# Patient Record
Sex: FEMALE | Race: WHITE | NOT HISPANIC OR LATINO | Employment: OTHER | URBAN - NONMETROPOLITAN AREA
[De-identification: names, ages, dates, MRNs, and addresses within clinical notes are randomized per-mention and may not be internally consistent; named-entity substitution may affect disease eponyms.]

---

## 2017-01-30 ENCOUNTER — APPOINTMENT (OUTPATIENT)
Dept: LAB | Facility: HOSPITAL | Age: 67
End: 2017-01-30

## 2017-01-30 ENCOUNTER — TRANSCRIBE ORDERS (OUTPATIENT)
Dept: ADMINISTRATIVE | Facility: HOSPITAL | Age: 67
End: 2017-01-30

## 2017-01-30 ENCOUNTER — HOSPITAL ENCOUNTER (OUTPATIENT)
Dept: GENERAL RADIOLOGY | Facility: HOSPITAL | Age: 67
Discharge: HOME | End: 2017-01-30
Admitting: NURSE PRACTITIONER

## 2017-01-30 DIAGNOSIS — J06.9 ACUTE UPPER RESPIRATORY INFECTIONS OF OTHER MULTIPLE SITES: ICD-10-CM

## 2017-01-30 DIAGNOSIS — J20.9 ACUTE BRONCHITIS, UNSPECIFIED ORGANISM: Primary | ICD-10-CM

## 2017-01-30 DIAGNOSIS — R05.9 COUGH: ICD-10-CM

## 2017-01-30 DIAGNOSIS — J30.1 ALLERGIC RHINITIS DUE TO POLLEN, UNSPECIFIED RHINITIS SEASONALITY: ICD-10-CM

## 2017-01-30 DIAGNOSIS — J30.89 ALLERGIC RHINITIS DUE TO DUST: ICD-10-CM

## 2017-01-30 LAB
BASOPHILS # BLD AUTO: 0.04 10*3/MM3 (ref 0–0.2)
BASOPHILS NFR BLD AUTO: 0.5 % (ref 0–2)
DEPRECATED RDW RBC AUTO: 44.2 FL (ref 40–54)
EOSINOPHIL # BLD AUTO: 0.2 10*3/MM3 (ref 0–0.7)
EOSINOPHIL NFR BLD AUTO: 2.6 % (ref 0–4)
ERYTHROCYTE [DISTWIDTH] IN BLOOD BY AUTOMATED COUNT: 13.1 % (ref 12–15)
HCT VFR BLD AUTO: 41.7 % (ref 37–47)
HGB BLD-MCNC: 14.2 G/DL (ref 12–16)
IMM GRANULOCYTES # BLD: 0.01 10*3/MM3 (ref 0–0.03)
IMM GRANULOCYTES NFR BLD: 0.1 % (ref 0–5)
LYMPHOCYTES # BLD AUTO: 1.51 10*3/MM3 (ref 0.72–4.86)
LYMPHOCYTES NFR BLD AUTO: 19.9 % (ref 15–45)
MCH RBC QN AUTO: 31.8 PG (ref 28–32)
MCHC RBC AUTO-ENTMCNC: 34.1 G/DL (ref 33–36)
MCV RBC AUTO: 93.3 FL (ref 82–98)
MONOCYTES # BLD AUTO: 0.32 10*3/MM3 (ref 0.19–1.3)
MONOCYTES NFR BLD AUTO: 4.2 % (ref 4–12)
NEUTROPHILS # BLD AUTO: 5.52 10*3/MM3 (ref 1.87–8.4)
NEUTROPHILS NFR BLD AUTO: 72.7 % (ref 39–78)
PLATELET # BLD AUTO: 279 10*3/MM3 (ref 130–400)
PMV BLD AUTO: 9.8 FL (ref 6–12)
RBC # BLD AUTO: 4.47 10*6/MM3 (ref 4.2–5.4)
WBC NRBC COR # BLD: 7.6 10*3/MM3 (ref 4.8–10.8)

## 2017-01-30 PROCEDURE — 86631 CHLAMYDIA ANTIBODY: CPT | Performed by: NURSE PRACTITIONER

## 2017-01-30 PROCEDURE — 85025 COMPLETE CBC W/AUTO DIFF WBC: CPT | Performed by: NURSE PRACTITIONER

## 2017-01-30 PROCEDURE — 36415 COLL VENOUS BLD VENIPUNCTURE: CPT | Performed by: NURSE PRACTITIONER

## 2017-01-30 PROCEDURE — 71020 HC CHEST PA AND LATERAL: CPT

## 2017-01-30 PROCEDURE — 86631 CHLAMYDIA ANTIBODY: CPT

## 2017-01-30 PROCEDURE — 86156 COLD AGGLUTININ SCREEN: CPT | Performed by: NURSE PRACTITIONER

## 2017-01-30 PROCEDURE — 86632 CHLAMYDIA IGM ANTIBODY: CPT

## 2017-01-31 LAB
COLD AGGLUTININS: NORMAL
M PNEUMONIAE IGG ABS: 193 U/ML (ref 0–99)
M PNEUMONIAE IGM ABS: <770 U/ML (ref 0–769)

## 2017-02-02 LAB
C PNEUM IGG TITR SER IF: ABNORMAL {TITER}
C PNEUM IGM TITR SER IF: ABNORMAL {TITER}
REF LAB TEST REF RANGE: ABNORMAL

## 2017-04-06 ENCOUNTER — OFFICE VISIT (OUTPATIENT)
Dept: GASTROENTEROLOGY | Facility: CLINIC | Age: 67
End: 2017-04-06

## 2017-04-06 VITALS
SYSTOLIC BLOOD PRESSURE: 132 MMHG | WEIGHT: 170 LBS | DIASTOLIC BLOOD PRESSURE: 82 MMHG | OXYGEN SATURATION: 90 % | TEMPERATURE: 97.1 F | BODY MASS INDEX: 25.18 KG/M2 | HEART RATE: 62 BPM | HEIGHT: 69 IN

## 2017-04-06 DIAGNOSIS — R13.19 OTHER DYSPHAGIA: Primary | ICD-10-CM

## 2017-04-06 DIAGNOSIS — K21.9 GASTROESOPHAGEAL REFLUX DISEASE WITHOUT ESOPHAGITIS: ICD-10-CM

## 2017-04-06 PROBLEM — R13.10 DYSPHAGIA: Status: ACTIVE | Noted: 2017-04-06

## 2017-04-06 PROCEDURE — 99214 OFFICE O/P EST MOD 30 MIN: CPT | Performed by: NURSE PRACTITIONER

## 2017-04-06 RX ORDER — MAGNESIUM GLUCONATE 27 MG(500)
500 TABLET ORAL 2 TIMES DAILY
COMMUNITY
End: 2017-08-09

## 2017-04-06 RX ORDER — SPIRONOLACTONE 25 MG/1
25 TABLET ORAL DAILY
COMMUNITY
End: 2017-08-09

## 2017-04-06 RX ORDER — DIPHENHYDRAMINE HYDROCHLORIDE 25 MG/1
TABLET ORAL
COMMUNITY
End: 2020-09-02

## 2017-04-06 NOTE — PROGRESS NOTES
Chief Complaint:   Chief Complaint   Patient presents with   • Difficulty Swallowing     Patient is here today having difficulty swallowing. She states that she gets choked alot too.         Patient ID: Earline Salazar is a 67 y.o. female     History of Present Illness:    This is a very pleasant 67 year female who comes in today with complaints of difficulty swallowing.  The patient states that this has been occurring for approximately 1 month and seems to be worsening.  The patient states that she feels as though food such as solids are getting stuck in her upper mid epigastric area.  She states that she finds that there are no relieving factors.  The patient states that she does have a history of GERD and takes Prevacid for this.  She states that is very well controlled unless she forgets to take it.    Shin any nausea, vomiting, hematemesis, pyrosis or odynophagia.  She denies any unintentional weight loss or loss of appetite.  She denies any fever or chills.  She denies any bright red blood per rectum or black tarry stools.    The patient's last endoscopy performed on 3/10/16 with findings of a small hiatus hernia and low grade of narrowing Schatzki ring that was dilated.    Past Medical History:   Diagnosis Date   • Allergic rhinitis    • Arthralgia    • Hyperlipidemia    • Schatzki's ring        Past Surgical History:   Procedure Laterality Date   • APPENDECTOMY     • COLONOSCOPY  03/10/2016   • HYSTERECTOMY     • PATENT FORAMEN OVALE CLOSURE     • REPLACEMENT TOTAL KNEE     • TONSILLECTOMY     • UPPER GASTROINTESTINAL ENDOSCOPY  03/10/2016         Current Outpatient Prescriptions:   •  Biotin (BIOTIN 5000) 5 MG capsule, Take  by mouth., Disp: , Rfl:   •  buPROPion SR (WELLBUTRIN SR) 150 MG 12 hr tablet, Take 150 mg by mouth 2 (Two) Times a Day., Disp: , Rfl:   •  calcium citrate-vitamin d (CITRACAL) 200-250 MG-UNIT tablet tablet, Take  by mouth Daily., Disp: , Rfl:   •  cetirizine (zyrTEC) 10 MG tablet,  "Take 10 mg by mouth Daily., Disp: , Rfl:   •  Flaxseed, Linseed, (FLAX SEED OIL PO), Take  by mouth., Disp: , Rfl:   •  lansoprazole (PREVACID) 15 MG capsule, Take 15 mg by mouth Daily., Disp: , Rfl:   •  levocetirizine (XYZAL) 5 MG tablet, Take 5 mg by mouth Every Evening., Disp: , Rfl:   •  magnesium gluconate (MAGONATE) 500 MG tablet, Take 500 mg by mouth 2 (Two) Times a Day., Disp: , Rfl:   •  montelukast (SINGULAIR) 10 MG tablet, Take 10 mg by mouth Every Night., Disp: , Rfl:   •  nabumetone (RELAFEN) 750 MG tablet, Take 750 mg by mouth 2 (Two) Times a Day., Disp: , Rfl:   •  pramipexole (MIRAPEX) 1 MG tablet, Take 1 mg by mouth 3 (Three) Times a Day., Disp: , Rfl:   •  spironolactone (ALDACTONE) 25 MG tablet, Take 25 mg by mouth Daily., Disp: , Rfl:   •  triamterene-hydrochlorothiazide (MAXZIDE-25) 37.5-25 MG per tablet, Take 1 tablet by mouth Daily., Disp: , Rfl:     No Known Allergies    Social History     Social History   • Marital status:      Spouse name: N/A   • Number of children: N/A   • Years of education: N/A     Occupational History   • Not on file.     Social History Main Topics   • Smoking status: Former Smoker   • Smokeless tobacco: Not on file   • Alcohol use Yes   • Drug use: Not on file   • Sexual activity: Not on file     Other Topics Concern   • Not on file     Social History Narrative       Family History   Problem Relation Age of Onset   • Colon polyps Mother        Vitals:    04/06/17 0848   BP: 132/82   Pulse: 62   Temp: 97.1 °F (36.2 °C)   SpO2: 90%   Weight: 170 lb (77.1 kg)   Height: 69\" (175.3 cm)       Review of Systems:    General:    Present -feeling well   Skin:    Not Present-Rash   HEENT:     Not Present-Acute visual changes or Acute hearing changes   Neck :    Not Present- swollen glands   Genitourinary:      Not Present- burning, frequency, urgency hematuria, dysuria,   Cardiovascular:   Not Present-chest pain, palpitations, or pressure   Respiratory:   Not Present- " shortness of breath or cough   Gastrointestinal:  Musculoskeletal:  Neurological:  Psychiatric:   Present as mentioned in the HP    Not Present. Recent gait disturbances.    Not Present-Seizures and weakness in extremities.    Not Present- Anxiety or Depression.       Physical Exam:    General Appearance:    Alert, cooperative, in no acute distress   Psych:    Mood appropriate    Eyes:          conjunctivae and sclerae normal, no   icterus, no pallor   ENMT:    Ears appear intact with no abnormalities noted oral mucosa moist   Neck:   No adenopathy, supple, trachea midline, no thyromegaly, no   carotid bruit, no JVD    Cardiovascular:    Regular rhythm and normal rate, normal S1 and S2, no            murmur, no gallop, no rub, no click   Gastrointestinal:     Inspection normal.  Normal bowel sounds, no masses, no organomegaly, soft round non-tender, non-distended, no guarding, no rebound or tenderness. No hepatosplenomegaly.   Skin:   No bleeding, bruising or rash   Neurologic:   nonfocal       Lab Results   Component Value Date    WBC 7.60 01/30/2017    WBC 5.40 09/24/2014    HGB 14.2 01/30/2017    HGB 14.6 09/24/2014    HCT 41.7 01/30/2017    HCT 43.9 09/24/2014    HCT 41.0 09/24/2014     01/30/2017     09/24/2014     09/24/2014        No results found for: NA, K, CL, CO2, BUN, CREATININE, BILITOT, ALKPHOS, ALT, AST, GLUCOSE    No results found for: INR    Assessment and Plan:    Earline was seen today for difficulty swallowing.    Diagnoses and all orders for this visit:    Other dysphagia  -     Case request endoscopy    Gastroesophageal reflux disease without esophagitis  -     Case request    Endoscopy to be scheduled.  Patient instructed to continue Prevacid.    There are no Patient Instructions on file for this visit.    Next follow-up appointment    The risks, benefits, and alternatives of endoscopy were reviewed with the patient today.  Risks including perforation, with or without  dilation, possibly requiring surgery.  Additional risks include risk of bleeding.  There is also the risk of a drug reaction or problems with anesthesia.  This will be discussed with the further by the anesthesia team on the day of the procedure. The benefits include the diagnosis and management of disease of the upper digestive tract.  Alternatives to endoscopy include upper GI series, laboratory testing, radiographic evaluation, or no intervention.  The patient verbalizes understanding and agrees.      EMR Dragon/Transcription disclaimer:  Much of this encounter note is an electronic transcription/translation of spoken language to printed text. The electronic translation of spoken language may permit erroneous, or at times, nonsensical words or phrases to be inadvertently transcribed; although I have reviewed the note for such errors, some may still exist.

## 2017-05-11 ENCOUNTER — ANESTHESIA EVENT (OUTPATIENT)
Dept: GASTROENTEROLOGY | Facility: HOSPITAL | Age: 67
End: 2017-05-11

## 2017-05-12 ENCOUNTER — HOSPITAL ENCOUNTER (OUTPATIENT)
Facility: HOSPITAL | Age: 67
Setting detail: HOSPITAL OUTPATIENT SURGERY
Discharge: HOME | End: 2017-05-12
Attending: INTERNAL MEDICINE | Admitting: INTERNAL MEDICINE

## 2017-05-12 ENCOUNTER — ANESTHESIA (OUTPATIENT)
Dept: GASTROENTEROLOGY | Facility: HOSPITAL | Age: 67
End: 2017-05-12

## 2017-05-12 VITALS
SYSTOLIC BLOOD PRESSURE: 122 MMHG | RESPIRATION RATE: 16 BRPM | OXYGEN SATURATION: 96 % | BODY MASS INDEX: 24.73 KG/M2 | WEIGHT: 167 LBS | HEIGHT: 69 IN | HEART RATE: 70 BPM | TEMPERATURE: 97.3 F | DIASTOLIC BLOOD PRESSURE: 68 MMHG

## 2017-05-12 PROCEDURE — C1726 CATH, BAL DIL, NON-VASCULAR: HCPCS | Performed by: INTERNAL MEDICINE

## 2017-05-12 PROCEDURE — C1726 CATH, BAL DIL, NON-VASCULAR: HCPCS

## 2017-05-12 PROCEDURE — 25010000002 PROPOFOL 10 MG/ML EMULSION: Performed by: NURSE ANESTHETIST, CERTIFIED REGISTERED

## 2017-05-12 PROCEDURE — 43249 ESOPH EGD DILATION <30 MM: CPT | Performed by: INTERNAL MEDICINE

## 2017-05-12 RX ORDER — LANSOPRAZOLE 15 MG/1
30 CAPSULE, DELAYED RELEASE ORAL DAILY
Qty: 30 CAPSULE | Refills: 11 | Status: SHIPPED | OUTPATIENT
Start: 2017-05-12 | End: 2017-08-09 | Stop reason: DRUGHIGH

## 2017-05-12 RX ORDER — PROPOFOL 10 MG/ML
VIAL (ML) INTRAVENOUS AS NEEDED
Status: DISCONTINUED | OUTPATIENT
Start: 2017-05-12 | End: 2017-05-12 | Stop reason: SURG

## 2017-05-12 RX ORDER — SODIUM CHLORIDE 0.9 % (FLUSH) 0.9 %
1-10 SYRINGE (ML) INJECTION AS NEEDED
Status: DISCONTINUED | OUTPATIENT
Start: 2017-05-12 | End: 2017-05-12 | Stop reason: HOSPADM

## 2017-05-12 RX ORDER — SODIUM CHLORIDE 9 MG/ML
100 INJECTION, SOLUTION INTRAVENOUS CONTINUOUS
Status: DISCONTINUED | OUTPATIENT
Start: 2017-05-12 | End: 2017-05-12 | Stop reason: HOSPADM

## 2017-05-12 RX ORDER — LIDOCAINE HYDROCHLORIDE 20 MG/ML
INJECTION, SOLUTION INFILTRATION; PERINEURAL AS NEEDED
Status: DISCONTINUED | OUTPATIENT
Start: 2017-05-12 | End: 2017-05-12 | Stop reason: SURG

## 2017-05-12 RX ADMIN — SODIUM CHLORIDE 100 ML/HR: 9 INJECTION, SOLUTION INTRAVENOUS at 10:36

## 2017-05-12 RX ADMIN — PROPOFOL 200 MG: 10 INJECTION, EMULSION INTRAVENOUS at 12:40

## 2017-05-12 RX ADMIN — PROPOFOL 200 MG: 10 INJECTION, EMULSION INTRAVENOUS at 12:46

## 2017-05-12 RX ADMIN — LIDOCAINE HYDROCHLORIDE 100 MG: 20 INJECTION, SOLUTION INFILTRATION; PERINEURAL at 12:40

## 2017-05-31 ENCOUNTER — TRANSCRIBE ORDERS (OUTPATIENT)
Dept: LAB | Facility: HOSPITAL | Age: 67
End: 2017-05-31

## 2017-05-31 ENCOUNTER — HOSPITAL ENCOUNTER (OUTPATIENT)
Dept: ULTRASOUND IMAGING | Facility: HOSPITAL | Age: 67
Discharge: HOME | End: 2017-05-31
Admitting: PHYSICIAN ASSISTANT

## 2017-05-31 DIAGNOSIS — R94.6 ABNORMAL FINDING ON THYROID FUNCTION TEST: ICD-10-CM

## 2017-05-31 DIAGNOSIS — G47.00 INSOMNIA, UNSPECIFIED: ICD-10-CM

## 2017-05-31 DIAGNOSIS — L65.8 OTHER SPECIFIED NONSCARRING HAIR LOSS: ICD-10-CM

## 2017-05-31 DIAGNOSIS — R94.6 ABNORMAL FINDING ON THYROID FUNCTION TEST: Primary | ICD-10-CM

## 2017-05-31 PROCEDURE — 76536 US EXAM OF HEAD AND NECK: CPT

## 2017-08-09 ENCOUNTER — OFFICE VISIT (OUTPATIENT)
Dept: OBSTETRICS AND GYNECOLOGY | Facility: CLINIC | Age: 67
End: 2017-08-09

## 2017-08-09 VITALS
HEIGHT: 69 IN | DIASTOLIC BLOOD PRESSURE: 80 MMHG | BODY MASS INDEX: 23.25 KG/M2 | SYSTOLIC BLOOD PRESSURE: 120 MMHG | WEIGHT: 157 LBS

## 2017-08-09 DIAGNOSIS — N95.1 VAGINAL DRYNESS, MENOPAUSAL: ICD-10-CM

## 2017-08-09 DIAGNOSIS — N93.9 VAGINA BLEEDING: Primary | ICD-10-CM

## 2017-08-09 DIAGNOSIS — Z78.9 NON-SMOKER: ICD-10-CM

## 2017-08-09 PROCEDURE — 99203 OFFICE O/P NEW LOW 30 MIN: CPT | Performed by: OBSTETRICS & GYNECOLOGY

## 2017-08-09 RX ORDER — MELATONIN
1000 DAILY
COMMUNITY
End: 2021-11-05

## 2017-08-09 RX ORDER — ASPIRIN 81 MG/1
81 TABLET, CHEWABLE ORAL DAILY
COMMUNITY
End: 2021-11-05

## 2017-08-09 RX ORDER — LANSOPRAZOLE 30 MG/1
30 CAPSULE, DELAYED RELEASE ORAL DAILY
COMMUNITY
Start: 2017-07-13 | End: 2020-09-02

## 2017-08-09 RX ORDER — BUPROPION HYDROCHLORIDE 300 MG/1
TABLET ORAL
COMMUNITY
Start: 2017-05-11 | End: 2018-09-12

## 2017-08-09 RX ORDER — NABUMETONE 500 MG/1
TABLET, FILM COATED ORAL
COMMUNITY
Start: 2017-07-13 | End: 2018-09-12

## 2017-10-11 ENCOUNTER — HOSPITAL ENCOUNTER (OUTPATIENT)
Dept: WOMENS IMAGING | Age: 67
Discharge: HOME | End: 2017-10-11
Payer: MEDICARE

## 2017-10-11 DIAGNOSIS — Z12.31 ENCOUNTER FOR SCREENING MAMMOGRAM FOR MALIGNANT NEOPLASM OF BREAST: ICD-10-CM

## 2017-10-11 PROCEDURE — 77063 BREAST TOMOSYNTHESIS BI: CPT

## 2017-10-18 ENCOUNTER — OFFICE VISIT (OUTPATIENT)
Dept: OBSTETRICS AND GYNECOLOGY | Facility: CLINIC | Age: 67
End: 2017-10-18

## 2017-10-18 VITALS
BODY MASS INDEX: 24.14 KG/M2 | HEIGHT: 69 IN | SYSTOLIC BLOOD PRESSURE: 120 MMHG | WEIGHT: 163 LBS | DIASTOLIC BLOOD PRESSURE: 74 MMHG

## 2017-10-18 DIAGNOSIS — N89.8 VAGINAL DRYNESS: ICD-10-CM

## 2017-10-18 DIAGNOSIS — Z78.9 NON-SMOKER: ICD-10-CM

## 2017-10-18 DIAGNOSIS — N93.9 VAGINA BLEEDING: Primary | ICD-10-CM

## 2017-10-18 PROCEDURE — 99213 OFFICE O/P EST LOW 20 MIN: CPT | Performed by: OBSTETRICS & GYNECOLOGY

## 2017-10-18 RX ORDER — PREDNISONE 20 MG/1
TABLET ORAL
COMMUNITY
Start: 2017-09-07 | End: 2018-05-08

## 2017-10-18 NOTE — PROGRESS NOTES
Subjective   Earline Salazar is a 67 y.o. female is here today for follow-up.  Has had good results when she remembers to use the cream    Vaginal Bleeding   The patient's primary symptoms include vaginal bleeding. The patient's pertinent negatives include no genital itching, genital lesions, genital odor, genital rash, missed menses, pelvic pain or vaginal discharge. This is a chronic problem. The current episode started more than 1 month ago. The problem occurs intermittently. The problem has been resolved. The patient is experiencing no pain. Pertinent negatives include no abdominal pain, anorexia, back pain, chills, constipation, diarrhea, discolored urine, dysuria, fever, flank pain, frequency, headaches, hematuria, joint pain, joint swelling, nausea, painful intercourse, rash, sore throat, urgency or vomiting. The vaginal discharge was normal. The vaginal bleeding is spotting. She has not been passing clots. She has not been passing tissue. Nothing aggravates the symptoms. Treatments tried: vaginal premarin cream. The treatment provided significant relief.       The following portions of the patient's history were reviewed and updated as appropriate: allergies, current medications, past family history, past medical history, past social history, past surgical history and problem list.    Review of Systems   Constitutional: Negative for chills and fever.   HENT: Negative for sore throat.    Gastrointestinal: Negative for abdominal pain, anorexia, constipation, diarrhea, nausea and vomiting.   Genitourinary: Positive for vaginal bleeding. Negative for dysuria, flank pain, frequency, hematuria, missed menses, pelvic pain, urgency and vaginal discharge.   Musculoskeletal: Negative for back pain and joint pain.   Skin: Negative for rash.   Neurological: Negative for headaches.   All other systems reviewed and are negative.      Objective   Physical Exam   Constitutional: She is oriented to person, place, and time.  She appears well-developed and well-nourished.   HENT:   Head: Normocephalic and atraumatic.   Neurological: She is alert and oriented to person, place, and time.   Skin: Skin is warm and dry.   Psychiatric: She has a normal mood and affect. Her behavior is normal. Judgment and thought content normal.   Nursing note and vitals reviewed.        Assessment/Plan   Earline was seen today for vaginal bleeding.    Diagnoses and all orders for this visit:    Vagina bleeding  -     conjugated estrogens (PREMARIN) 0.625 MG/GM vaginal cream; Insert  into the vagina 3 (Three) Times a Week.    Vaginal dryness  -     conjugated estrogens (PREMARIN) 0.625 MG/GM vaginal cream; Insert  into the vagina 3 (Three) Times a Week.    Non-smoker      Aldo Cabral MD

## 2018-03-27 ENCOUNTER — APPOINTMENT (OUTPATIENT)
Dept: LAB | Facility: HOSPITAL | Age: 68
End: 2018-03-27

## 2018-03-27 ENCOUNTER — TRANSCRIBE ORDERS (OUTPATIENT)
Dept: ADMINISTRATIVE | Facility: HOSPITAL | Age: 68
End: 2018-03-27

## 2018-03-27 DIAGNOSIS — J20.9 ACUTE BRONCHITIS, UNSPECIFIED ORGANISM: ICD-10-CM

## 2018-03-27 DIAGNOSIS — J01.90 ACUTE SINUSITIS, RECURRENCE NOT SPECIFIED, UNSPECIFIED LOCATION: Primary | ICD-10-CM

## 2018-03-27 LAB
ALBUMIN SERPL-MCNC: 3.8 G/DL (ref 3.5–5)
ALBUMIN/GLOB SERPL: 1.3 G/DL (ref 1.1–2.5)
ALP SERPL-CCNC: 79 U/L (ref 24–120)
ALT SERPL W P-5'-P-CCNC: 43 U/L (ref 0–54)
ANION GAP SERPL CALCULATED.3IONS-SCNC: 9 MMOL/L (ref 4–13)
AST SERPL-CCNC: 31 U/L (ref 7–45)
BASOPHILS # BLD AUTO: 0.04 10*3/MM3 (ref 0–0.2)
BASOPHILS NFR BLD AUTO: 0.5 % (ref 0–2)
BILIRUB SERPL-MCNC: 0.8 MG/DL (ref 0.1–1)
BUN BLD-MCNC: 24 MG/DL (ref 5–21)
BUN/CREAT SERPL: 30 (ref 7–25)
CALCIUM SPEC-SCNC: 9.4 MG/DL (ref 8.4–10.4)
CHLORIDE SERPL-SCNC: 103 MMOL/L (ref 98–110)
CO2 SERPL-SCNC: 29 MMOL/L (ref 24–31)
CREAT BLD-MCNC: 0.8 MG/DL (ref 0.5–1.4)
DEPRECATED RDW RBC AUTO: 41.1 FL (ref 40–54)
EOSINOPHIL # BLD AUTO: 0.24 10*3/MM3 (ref 0–0.7)
EOSINOPHIL NFR BLD AUTO: 2.9 % (ref 0–4)
ERYTHROCYTE [DISTWIDTH] IN BLOOD BY AUTOMATED COUNT: 12.5 % (ref 12–15)
ERYTHROCYTE [SEDIMENTATION RATE] IN BLOOD: 3 MM/HR (ref 0–20)
GFR SERPL CREATININE-BSD FRML MDRD: 72 ML/MIN/1.73
GLOBULIN UR ELPH-MCNC: 2.9 GM/DL
GLUCOSE BLD-MCNC: 103 MG/DL (ref 70–100)
HCT VFR BLD AUTO: 41.5 % (ref 37–47)
HGB BLD-MCNC: 14.2 G/DL (ref 12–16)
IMM GRANULOCYTES # BLD: 0.03 10*3/MM3 (ref 0–0.03)
IMM GRANULOCYTES NFR BLD: 0.4 % (ref 0–5)
LYMPHOCYTES # BLD AUTO: 1.28 10*3/MM3 (ref 0.72–4.86)
LYMPHOCYTES NFR BLD AUTO: 15.2 % (ref 15–45)
MCH RBC QN AUTO: 31.1 PG (ref 28–32)
MCHC RBC AUTO-ENTMCNC: 34.2 G/DL (ref 33–36)
MCV RBC AUTO: 91 FL (ref 82–98)
MONOCYTES # BLD AUTO: 0.44 10*3/MM3 (ref 0.19–1.3)
MONOCYTES NFR BLD AUTO: 5.2 % (ref 4–12)
NEUTROPHILS # BLD AUTO: 6.39 10*3/MM3 (ref 1.87–8.4)
NEUTROPHILS NFR BLD AUTO: 75.8 % (ref 39–78)
NRBC BLD MANUAL-RTO: 0 /100 WBC (ref 0–0)
PLATELET # BLD AUTO: 251 10*3/MM3 (ref 130–400)
PMV BLD AUTO: 9.1 FL (ref 6–12)
POTASSIUM BLD-SCNC: 5.1 MMOL/L (ref 3.5–5.3)
PROT SERPL-MCNC: 6.7 G/DL (ref 6.3–8.7)
RBC # BLD AUTO: 4.56 10*6/MM3 (ref 4.2–5.4)
SODIUM BLD-SCNC: 141 MMOL/L (ref 135–145)
WBC NRBC COR # BLD: 8.42 10*3/MM3 (ref 4.8–10.8)

## 2018-03-27 PROCEDURE — 86738 MYCOPLASMA ANTIBODY: CPT | Performed by: NURSE PRACTITIONER

## 2018-03-27 PROCEDURE — 36415 COLL VENOUS BLD VENIPUNCTURE: CPT

## 2018-03-27 PROCEDURE — 80053 COMPREHEN METABOLIC PANEL: CPT | Performed by: NURSE PRACTITIONER

## 2018-03-27 PROCEDURE — 85651 RBC SED RATE NONAUTOMATED: CPT | Performed by: NURSE PRACTITIONER

## 2018-03-27 PROCEDURE — 85025 COMPLETE CBC W/AUTO DIFF WBC: CPT | Performed by: NURSE PRACTITIONER

## 2018-03-27 PROCEDURE — 86157 COLD AGGLUTININ TITER: CPT | Performed by: NURSE PRACTITIONER

## 2018-03-28 LAB
M PNEUMONIAE IGG ABS: <100 U/ML (ref 0–99)
M PNEUMONIAE IGM ABS: <770 U/ML (ref 0–769)

## 2018-03-29 LAB — CA TITR SERPL AGGL: NEGATIVE {TITER}

## 2018-04-12 ENCOUNTER — OFFICE VISIT (OUTPATIENT)
Dept: OTOLARYNGOLOGY | Facility: CLINIC | Age: 68
End: 2018-04-12

## 2018-04-12 VITALS
TEMPERATURE: 97.4 F | DIASTOLIC BLOOD PRESSURE: 78 MMHG | SYSTOLIC BLOOD PRESSURE: 110 MMHG | BODY MASS INDEX: 24.44 KG/M2 | HEIGHT: 69 IN | WEIGHT: 165 LBS

## 2018-04-12 DIAGNOSIS — K21.9 GASTROESOPHAGEAL REFLUX DISEASE WITHOUT ESOPHAGITIS: ICD-10-CM

## 2018-04-12 DIAGNOSIS — J32.8 OTHER CHRONIC SINUSITIS: Primary | ICD-10-CM

## 2018-04-12 DIAGNOSIS — J01.81 OTHER ACUTE RECURRENT SINUSITIS: ICD-10-CM

## 2018-04-12 PROCEDURE — 99203 OFFICE O/P NEW LOW 30 MIN: CPT | Performed by: PHYSICIAN ASSISTANT

## 2018-04-12 RX ORDER — FLUTICASONE PROPIONATE 50 MCG
2 SPRAY, SUSPENSION (ML) NASAL DAILY
COMMUNITY
End: 2018-05-08 | Stop reason: SDUPTHER

## 2018-04-12 RX ORDER — AZELASTINE 1 MG/ML
2 SPRAY, METERED NASAL 2 TIMES DAILY
COMMUNITY
End: 2018-05-08 | Stop reason: SDUPTHER

## 2018-04-12 RX ORDER — CLINDAMYCIN HYDROCHLORIDE 300 MG/1
300 CAPSULE ORAL 3 TIMES DAILY
COMMUNITY
End: 2018-05-08

## 2018-04-12 NOTE — PROGRESS NOTES
YOB: 1950  Location: [facility]  Location Address: [address] [place] [address], [facility], [address] [place] [address]  Location Phone: [phone]    Chief Complaint   Patient presents with   • Sinus Problem       History of Present Illness  Earline Salazar is a 68 y.o. female.  Earline Salazar is here for evaluation of ENT complaints. The patient has had problems with sinonasal complaints  The symptoms are not localized to a particular location. The patient has had moderate to severe symptoms. The symptoms have been present for the last several months The symptoms are aggravated by  no identifiable factors. The symptoms are improved by no identifiable factors.           Past Medical History:   Diagnosis Date   • Allergic rhinitis    • Arthralgia    • Arthritis    • Hyperlipidemia    • Schatzki's ring    • Swelling    • Vaginal bleeding        Past Surgical History:   Procedure Laterality Date   • APPENDECTOMY     • COLONOSCOPY  03/10/2016   • ENDOSCOPY N/A 2017    Procedure: ESOPHAGOGASTRODUODENOSCOPY WITH ANESTHESIA;  Surgeon: Xochitl Reyes MD;  Location: [facility] ENDOSCOPY;  Service:    • HYSTERECTOMY     • KNEE ARTHROSCOPY W/ MENISCAL REPAIR Left    • PATENT FORAMEN OVALE CLOSURE     • TONSILLECTOMY     • UPPER GASTROINTESTINAL ENDOSCOPY  03/10/2016       Outpatient Prescriptions Marked as Taking for the 18 encounter (Office Visit) with CATY Toney   Medication Sig Dispense Refill   • aspirin 81 MG chewable tablet Chew 81 mg Daily.     • azelastine (ASTELIN) 0.1 % nasal spray 2 sprays into each nostril 2 (Two) Times a Day. Use in each nostril as directed     • calcium citrate-vitamin d (CITRACAL) 200-250 MG-UNIT tablet tablet Take  by mouth Daily.     • cholecalciferol (VITAMIN D3) 1000 UNITS tablet Take 1,000 Units by mouth Daily.     • clindamycin (CLEOCIN) 300 MG capsule Take 300 mg by mouth 3 (Three) Times a Day.     • fluticasone (FLONASE) 50 MCG/ACT nasal spray 2 sprays into  each nostril Daily.     • levocetirizine (XYZAL) 5 MG tablet Take 5 mg by mouth Every Evening.     • Lifitegrast (XIIDRA OP) Apply  to eye.     • magnesium oxide (MAGOX) 400 (241.3 MG) MG tablet tablet Take 400 mg by mouth Daily.     • mometasone-formoterol (DULERA 200) 200-5 MCG/ACT inhaler Inhale 2 puffs 2 (Two) Times a Day.     • Multiple Vitamins-Minerals (CENTRUM SILVER 50+WOMEN PO) Take  by mouth.     • pramipexole (MIRAPEX) 1 MG tablet Take 1 mg by mouth 3 (Three) Times a Day.     • Specialty Vitamins Products (VITAMINS FOR HAIR PO) Take  by mouth. Hair growth support     • triamterene-hydrochlorothiazide (MAXZIDE-25) 37.5-25 MG per tablet Take 1 tablet by mouth Daily.         Review of patient's allergies indicates no known allergies.    Family History   Problem Relation Age of Onset   • Colon polyps Mother    • Heart disease Mother    • Thyroid cancer Sister    • Leukemia Sister        Social History     Social History   • Marital status:      Spouse name: N/A   • Number of children: N/A   • Years of education: N/A     Occupational History   • Not on file.     Social History Main Topics   • Smoking status: Former Smoker     Quit date: 5/12/2006   • Smokeless tobacco: Never Used   • Alcohol use Yes      Comment: occ   • Drug use: No   • Sexual activity: Yes     Partners: Male     Other Topics Concern   • Not on file     Social History Narrative   • No narrative on file       Review of Systems   Constitutional: Negative for activity change, appetite change, chills, diaphoresis, fatigue, fever and unexpected weight change.   HENT: Positive for congestion, postnasal drip and sinus pressure. Negative for dental problem, drooling, ear discharge, ear pain, facial swelling, hearing loss, mouth sores, nosebleeds, rhinorrhea, sneezing, sore throat, tinnitus, trouble swallowing and voice change.    Eyes: Negative.    Respiratory: Negative.    Cardiovascular: Negative.    Gastrointestinal: Negative.     Endocrine: Negative.    Skin: Negative.    Allergic/Immunologic: Negative for environmental allergies, food allergies and immunocompromised state.   Neurological: Negative.    Hematological: Negative.    Psychiatric/Behavioral: Negative.        Vitals:    04/12/18 1307   BP: 110/78   Temp: 97.4 °F (36.3 °C)       Body mass index is 24.37 kg/m².    Objective     Physical Exam  CONSTITUTIONAL: well nourished, alert, oriented, in no acute distress     COMMUNICATION AND VOICE: able to communicate normally, normal voice quality    HEAD: normocephalic, no lesions, atraumatic, no tenderness, no masses     FACE: appearance normal, no lesions, no tenderness, no deformities, facial motion symmetric    SALIVARY GLANDS: parotid glands with no tenderness, no swelling, no masses, submandibular glands with normal size, nontender    EYES: ocular motility normal, eyelids normal, orbits normal, no proptosis, conjunctiva normal , pupils equal, round     EARS:  Hearing: response to conversational voice normal bilaterally   External Ears: auricles without lesions  Otoscopic: tympanic membrane appearance normal, no lesions, no perforation, normal mobility, no fluid    NOSE:  External Nose: structure normal, no tenderness on palpation, no nasal discharge, no lesions, no evidence of trauma, nostrils patent   Intranasal Exam: nasal mucosa edema and inflammation, vestibule within normal limits, inferior turbinate enlarged, nasal septum midline   Nasopharynx:     ORAL:  Lips: upper and lower lips without lesion   Teeth: dentition within normal limits for age   Gums: gingivae healthy   Oral Mucosa: oral mucosa normal, no mucosal lesions   Floor of Mouth: Warthin’s duct patent, mucosa normal  Tongue: lingual mucosa normal without lesions, normal tongue mobility   Palate: soft and hard palates with normal mucosa and structure  Oropharynx: oropharyngeal mucosa erythema with postnasal drainage    NECK: neck appearance normal, no mass,  noted  without erythema or tenderness    THYROID: no overt thyromegaly, no tenderness, nodules or mass present on palpation, position midline     LYMPH NODES: no lymphadenopathy    CHEST/RESPIRATORY: respiratory effort normal, normal breath sounds     CARDIOVASCULAR: rate and rhythm normal, extremities without cyanosis or edema      NEUROLOGIC/PSYCHIATRIC: oriented to time, place and person, mood normal, affect appropriate, CN II-XII intact grossly    Assessment/Plan   Problems Addressed this Visit        Respiratory    Other chronic sinusitis - Primary    Relevant Medications    azelastine (ASTELIN) 0.1 % nasal spray    fluticasone (FLONASE) 50 MCG/ACT nasal spray    clindamycin (CLEOCIN) 300 MG capsule    Other Relevant Orders    CT Sinus Without Contrast    Other acute recurrent sinusitis    Relevant Medications    azelastine (ASTELIN) 0.1 % nasal spray    fluticasone (FLONASE) 50 MCG/ACT nasal spray    clindamycin (CLEOCIN) 300 MG capsule    Other Relevant Orders    CT Sinus Without Contrast       Digestive    Gastroesophageal reflux disease without esophagitis      Other Visit Diagnoses    None.       * Surgery not found *  Orders Placed This Encounter   Procedures   • CT Sinus Without Contrast     Standing Status:   Future     Standing Expiration Date:   4/12/2019     Return in about 4 weeks (around 5/10/2018) for Recheck sinus with sinus CT with Dr. Morgan.       Patient Instructions   Will start Flonase and Astelin, Clindamycin, and get sinus CT at follow-up. If symptoms have not improved will consider surgical options.

## 2018-04-16 NOTE — PATIENT INSTRUCTIONS
Will start Flonase and Astelin, Clindamycin, and get sinus CT at follow-up. If symptoms have not improved will consider surgical options.

## 2018-05-07 NOTE — PROGRESS NOTES
Darian Morgan MD     Chief Complaint   Patient presents with   • Follow-up     recheck sinuses       HPI   Earline Salazar is a  68 y.o. female who is here for follow up. She was last seen in the office on by the mid-level practitioner 04/15/2018 by the mid-level practitioner.  She had sinonasal complaints and was placed  clindamycin, Astelin and Flonase.  She had a previous CT scan of the sinuses that showed sphenoid opacification with mild mucosal thickening in the maxillary sinuses. She has had improvement of her complaints. Overall she has improved sinus complaints but has some ear pressure.    Review of Systems:  Reviewed per patient intake note    Past History:  Past medical and surgical history, family history and social history reviewed and updated when appropriate.  Current medications and allergies reviewed and updated when appropriate.  Allergies:  Review of patient's allergies indicates no known allergies.    Vital Signs:   Temp:  [97.2 °F (36.2 °C)] 97.2 °F (36.2 °C)  BP: (128)/(76) 128/76    Physical Exam   CONSTITUTIONAL: well nourished, well-developed, alert, oriented, in no acute distress   COMMUNICATION AND VOICE: able to communicate normally, normal voice quality  HEAD: normocephalic, no lesions, atraumatic, no tenderness, no masses   FACE: appearance normal, no lesions, no tenderness, no deformities, facial motion symmetric  EYES: ocular motility normal, eyelids normal, orbits normal, no proptosis, conjunctiva normal , pupils equal, round  HEARING: response to conversational voice normal bilaterally   EXTERNAL EARS: auricles without lesions  EXTERNAL NOSE: structure normal, no tenderness on palpation, no nasal discharge, no lesions, no evidence of trauma, nostrils patent  INTRANASAL EXAM: nasal mucosa with mucosal congestion and erythema, nasal septum without overt anterior deviation  LIPS: structure normal, no tenderness on palpation, no lesions, no evidence of trauma  NECK: neck  appearance normal  LYMPH NODES: no lymphadenopathy  CHEST/RESPIRATORY: respiratory effort normal  CARDIOVASCULAR: extremities without cyanosis or edema, no overt jugulovenous distension present  NEUROLOGIC/PSYCHIATRIC: oriented appropriately for age, mood normal, affect appropriate, cranial nerves intact grossly unless specifically mentioned above     RESULTS REVIEW:    I have personally reviewed the patient's ct scan of the sinuses without contrast images.   There is improvement of the sphenoid but mild persistent maxillary floor edema.    Assessment   1. Chronic recurrent sinusitis    2. Chronic allergic rhinitis, unspecified seasonality, unspecified trigger    3. Other chronic sinusitis    4. Other acute recurrent sinusitis        Plan    Medical and surgical options were discussed including observation and balloon sinuplasty under general anesthesia. Risks, benefits and alternatives were discussed and questions were answered. After considering the options, the patient decided to proceed with continued medical management.  Continue current management plan.    New Medications Ordered This Visit   Medications   • azelastine (ASTELIN) 0.1 % nasal spray     Si sprays into each nostril Daily. Use in each nostril as directed     Dispense:  1 each     Refill:  6   • fluticasone (FLONASE) 50 MCG/ACT nasal spray     Si sprays into each nostril Daily.     Dispense:  1 bottle     Refill:  6       Return in about 4 months (around 2018).    Darian Morgan MD  18  3:11 PM

## 2018-05-08 ENCOUNTER — OFFICE VISIT (OUTPATIENT)
Dept: OTOLARYNGOLOGY | Facility: CLINIC | Age: 68
End: 2018-05-08

## 2018-05-08 ENCOUNTER — CLINICAL SUPPORT (OUTPATIENT)
Dept: OTOLARYNGOLOGY | Facility: CLINIC | Age: 68
End: 2018-05-08

## 2018-05-08 VITALS
SYSTOLIC BLOOD PRESSURE: 128 MMHG | HEIGHT: 69 IN | DIASTOLIC BLOOD PRESSURE: 76 MMHG | TEMPERATURE: 97.2 F | WEIGHT: 167 LBS | BODY MASS INDEX: 24.73 KG/M2

## 2018-05-08 DIAGNOSIS — J01.81 OTHER ACUTE RECURRENT SINUSITIS: ICD-10-CM

## 2018-05-08 DIAGNOSIS — J32.9 CHRONIC RECURRENT SINUSITIS: Primary | ICD-10-CM

## 2018-05-08 DIAGNOSIS — J32.8 OTHER CHRONIC SINUSITIS: ICD-10-CM

## 2018-05-08 DIAGNOSIS — J30.9 CHRONIC ALLERGIC RHINITIS, UNSPECIFIED SEASONALITY, UNSPECIFIED TRIGGER: ICD-10-CM

## 2018-05-08 PROCEDURE — 70486 CT MAXILLOFACIAL W/O DYE: CPT | Performed by: PHYSICIAN ASSISTANT

## 2018-05-08 PROCEDURE — 99214 OFFICE O/P EST MOD 30 MIN: CPT | Performed by: OTOLARYNGOLOGY

## 2018-05-08 RX ORDER — FLUTICASONE PROPIONATE 50 MCG
2 SPRAY, SUSPENSION (ML) NASAL DAILY
Qty: 1 BOTTLE | Refills: 6 | Status: SHIPPED | OUTPATIENT
Start: 2018-05-08 | End: 2018-10-18 | Stop reason: SDUPTHER

## 2018-05-08 RX ORDER — TRAZODONE HYDROCHLORIDE 50 MG/1
50 TABLET ORAL AS NEEDED
COMMUNITY
Start: 2018-02-26 | End: 2020-09-02

## 2018-05-08 RX ORDER — AZELASTINE 1 MG/ML
2 SPRAY, METERED NASAL DAILY
Qty: 1 EACH | Refills: 6 | Status: SHIPPED | OUTPATIENT
Start: 2018-05-08 | End: 2018-10-18 | Stop reason: SDUPTHER

## 2018-05-08 NOTE — PROGRESS NOTES
Luz Jesus   Patient Intake Note    Review of Systems  Review of Systems   Constitutional: Negative for chills, fatigue and fever.   HENT:        See HPI   Respiratory: Negative for cough, choking, shortness of breath and wheezing.    Cardiovascular: Negative.    Gastrointestinal: Negative for constipation, diarrhea, nausea and vomiting.   Allergic/Immunologic: Positive for environmental allergies. Negative for food allergies.   Neurological: Positive for headaches. Negative for dizziness and light-headedness.   Hematological: Does not bruise/bleed easily.   Psychiatric/Behavioral: Negative for sleep disturbance.       QUALITY MEASURES    Body Mass Index Screening and Follow-Up Plan  Body mass index is 24.66 kg/m².      Tobacco Use: Screening and Cessation Intervention  Smoking status: Former Smoker                                                              Packs/day: 0.00      Years: 0.00         Quit date: 5/12/2006  Smokeless tobacco: Never Used                            Luz Jesus  5/8/2018  2:45 PM

## 2018-05-22 DIAGNOSIS — J01.81 OTHER ACUTE RECURRENT SINUSITIS: ICD-10-CM

## 2018-05-22 DIAGNOSIS — J32.8 OTHER CHRONIC SINUSITIS: ICD-10-CM

## 2018-08-03 ENCOUNTER — OFFICE VISIT (OUTPATIENT)
Dept: OTOLARYNGOLOGY | Facility: CLINIC | Age: 68
End: 2018-08-03

## 2018-08-03 VITALS
DIASTOLIC BLOOD PRESSURE: 84 MMHG | BODY MASS INDEX: 24.59 KG/M2 | TEMPERATURE: 97.5 F | WEIGHT: 166 LBS | SYSTOLIC BLOOD PRESSURE: 128 MMHG | HEIGHT: 69 IN

## 2018-08-03 DIAGNOSIS — K21.9 LARYNGOPHARYNGEAL REFLUX (LPR): Primary | ICD-10-CM

## 2018-08-03 DIAGNOSIS — J37.0 CHRONIC LARYNGITIS: ICD-10-CM

## 2018-08-03 DIAGNOSIS — J32.9 CHRONIC SINUSITIS, UNSPECIFIED LOCATION: ICD-10-CM

## 2018-08-03 DIAGNOSIS — Z87.891 HISTORY OF SMOKING: ICD-10-CM

## 2018-08-03 PROCEDURE — 31575 DIAGNOSTIC LARYNGOSCOPY: CPT | Performed by: NURSE PRACTITIONER

## 2018-08-03 PROCEDURE — 99214 OFFICE O/P EST MOD 30 MIN: CPT | Performed by: NURSE PRACTITIONER

## 2018-08-03 RX ORDER — RANITIDINE 150 MG/1
150 TABLET ORAL 2 TIMES DAILY
COMMUNITY
End: 2020-09-02

## 2018-08-03 NOTE — PATIENT INSTRUCTIONS
Gastroesophageal Reflux Disease, Adult  Normally, food travels down the esophagus and stays in the stomach to be digested. However, when a person has gastroesophageal reflux disease (GERD), food and stomach acid move back up into the esophagus. When this happens, the esophagus becomes sore and inflamed. Over time, GERD can create small holes (ulcers) in the lining of the esophagus.  What are the causes?  This condition is caused by a problem with the muscle between the esophagus and the stomach (lower esophageal sphincter, or LES). Normally, the LES muscle closes after food passes through the esophagus to the stomach. When the LES is weakened or abnormal, it does not close properly, and that allows food and stomach acid to go back up into the esophagus. The LES can be weakened by certain dietary substances, medicines, and medical conditions, including:  · Tobacco use.  · Pregnancy.  · Having a hiatal hernia.  · Heavy alcohol use.  · Certain foods and beverages, such as coffee, chocolate, onions, and peppermint.    What increases the risk?  This condition is more likely to develop in:  · People who have an increased body weight.  · People who have connective tissue disorders.  · People who use NSAID medicines.    What are the signs or symptoms?  Symptoms of this condition include:  · Heartburn.  · Difficult or painful swallowing.  · The feeling of having a lump in the throat.  · A bitter taste in the mouth.  · Bad breath.  · Having a large amount of saliva.  · Having an upset or bloated stomach.  · Belching.  · Chest pain.  · Shortness of breath or wheezing.  · Ongoing (chronic) cough or a night-time cough.  · Wearing away of tooth enamel.  · Weight loss.    Different conditions can cause chest pain. Make sure to see your health care provider if you experience chest pain.  How is this diagnosed?  Your health care provider will take a medical history and perform a physical exam. To determine if you have mild or severe  GERD, your health care provider may also monitor how you respond to treatment. You may also have other tests, including:  · An endoscopy to examine your stomach and esophagus with a small camera.  · A test that measures the acidity level in your esophagus.  · A test that measures how much pressure is on your esophagus.  · A barium swallow or modified barium swallow to show the shape, size, and functioning of your esophagus.    How is this treated?  The goal of treatment is to help relieve your symptoms and to prevent complications. Treatment for this condition may vary depending on how severe your symptoms are. Your health care provider may recommend:  · Changes to your diet.  · Medicine.  · Surgery.    Follow these instructions at home:  Diet  · Follow a diet as recommended by your health care provider. This may involve avoiding foods and drinks such as:  ? Coffee and tea (with or without caffeine).  ? Drinks that contain alcohol.  ? Energy drinks and sports drinks.  ? Carbonated drinks or sodas.  ? Chocolate and cocoa.  ? Peppermint and mint flavorings.  ? Garlic and onions.  ? Horseradish.  ? Spicy and acidic foods, including peppers, chili powder, scott powder, vinegar, hot sauces, and barbecue sauce.  ? Citrus fruit juices and citrus fruits, such as oranges, gaby, and limes.  ? Tomato-based foods, such as red sauce, chili, salsa, and pizza with red sauce.  ? Fried and fatty foods, such as donuts, french fries, potato chips, and high-fat dressings.  ? High-fat meats, such as hot dogs and fatty cuts of red and white meats, such as rib eye steak, sausage, ham, and montoya.  ? High-fat dairy items, such as whole milk, butter, and cream cheese.  · Eat small, frequent meals instead of large meals.  · Avoid drinking large amounts of liquid with your meals.  · Avoid eating meals during the 2-3 hours before bedtime.  · Avoid lying down right after you eat.  · Do not exercise right after you eat.  General  instructions  · Pay attention to any changes in your symptoms.  · Take over-the-counter and prescription medicines only as told by your health care provider. Do not take aspirin, ibuprofen, or other NSAIDs unless your health care provider told you to do so.  · Do not use any tobacco products, including cigarettes, chewing tobacco, and e-cigarettes. If you need help quitting, ask your health care provider.  · Wear loose-fitting clothing. Do not wear anything tight around your waist that causes pressure on your abdomen.  · Raise (elevate) the head of your bed 6 inches (15cm).  · Try to reduce your stress, such as with yoga or meditation. If you need help reducing stress, ask your health care provider.  · If you are overweight, reduce your weight to an amount that is healthy for you. Ask your health care provider for guidance about a safe weight loss goal.  · Keep all follow-up visits as told by your health care provider. This is important.  Contact a health care provider if:  · You have new symptoms.  · You have unexplained weight loss.  · You have difficulty swallowing, or it hurts to swallow.  · You have wheezing or a persistent cough.  · Your symptoms do not improve with treatment.  · You have a hoarse voice.  Get help right away if:  · You have pain in your arms, neck, jaw, teeth, or back.  · You feel sweaty, dizzy, or light-headed.  · You have chest pain or shortness of breath.  · You vomit and your vomit looks like blood or coffee grounds.  · You faint.  · Your stool is bloody or black.  · You cannot swallow, drink, or eat.  This information is not intended to replace advice given to you by your health care provider. Make sure you discuss any questions you have with your health care provider.  Document Released: 09/27/2006 Document Revised: 05/17/2017 Document Reviewed: 04/13/2016  [employer] Interactive Patient Education © 2018 [employer]      Consider repeating steroids - she defer at this time  Add Prevacid  in the AM and take Zantac in PM  Consider changing from nicotine lozenges to patch  Call for problems or worsening symptoms

## 2018-08-03 NOTE — PROGRESS NOTES
YOB: 1950  Location: [facility]  Location Address: [address] [place] [address], [facility], [address] [place] [address]  Location Phone: [phone]    Chief Complaint   Patient presents with   • Hoarse     since 18, treated with abx, not any better       History of Present Illness  Earline Salazar is a 68 y.o. female.  Earline Salazar is here for follow up of ENT complaints. The patient has had problems with throat complaints and hoarseness  The symptoms are not localized to a particular location. The patient has had moderate symptoms. The symptoms have been present for the last 2 months The symptoms are aggravated by  no identifiable factors. The symptoms are improved by no identifiable factors.  Hx of smoking quit 11 years ago.  She has had hoarseness.  In December, she stopped her Prevacid and changed to Zantac.  She was treated with oral steroids.   She has been using Nicotine lozenges for 11 years.       Past Medical History:   Diagnosis Date   • Allergic rhinitis    • Arthralgia    • Arthritis    • Hyperlipidemia    • Schatzki's ring    • Swelling    • Vaginal bleeding        Past Surgical History:   Procedure Laterality Date   • APPENDECTOMY     • COLONOSCOPY  03/10/2016   • ENDOSCOPY N/A 2017    Procedure: ESOPHAGOGASTRODUODENOSCOPY WITH ANESTHESIA;  Surgeon: Xochitl Reyes MD;  Location: [facility];  Service:    • HYSTERECTOMY     • KNEE ARTHROSCOPY W/ MENISCAL REPAIR Left    • PATENT FORAMEN OVALE CLOSURE     • TONSILLECTOMY     • UPPER GASTROINTESTINAL ENDOSCOPY  03/10/2016       Outpatient Prescriptions Marked as Taking for the 8/3/18 encounter (Office Visit) with Grace Barrios APRN   Medication Sig Dispense Refill   • aspirin 81 MG chewable tablet Chew 81 mg Daily.     • azelastine (ASTELIN) 0.1 % nasal spray 2 sprays into each nostril Daily. Use in each nostril as directed 1 each 6   • Biotin (BIOTIN 5000) 5 MG capsule Take  by mouth.     • buPROPion XL (WELLBUTRIN XL) 300 MG 24  hr tablet      • calcium citrate-vitamin d (CITRACAL) 200-250 MG-UNIT tablet tablet Take  by mouth Daily.     • cholecalciferol (VITAMIN D3) 1000 UNITS tablet Take 1,000 Units by mouth Daily.     • fluticasone (FLONASE) 50 MCG/ACT nasal spray 2 sprays into each nostril Daily. 1 bottle 6   • lansoprazole (PREVACID) 30 MG capsule      • levocetirizine (XYZAL) 5 MG tablet Take 5 mg by mouth Every Evening.     • Lifitegrast (XIIDRA OP) Apply  to eye.     • magnesium oxide (MAGOX) 400 (241.3 MG) MG tablet tablet Take 400 mg by mouth Daily.     • mometasone-formoterol (DULERA 200) 200-5 MCG/ACT inhaler Inhale 2 puffs 2 (Two) Times a Day.     • Multiple Vitamins-Minerals (CENTRUM SILVER 50+WOMEN PO) Take  by mouth.     • nabumetone (RELAFEN) 500 MG tablet      • NON FORMULARY Hemp oil, CBD oil     • pramipexole (MIRAPEX) 1 MG tablet Take 1 mg by mouth 3 (Three) Times a Day.     • raNITIdine (ZANTAC) 150 MG tablet Take 150 mg by mouth 2 (Two) Times a Day.     • Specialty Vitamins Products (VITAMINS FOR HAIR PO) Take  by mouth. Hair growth support     • traZODone (DESYREL) 50 MG tablet As Needed.     • triamterene-hydrochlorothiazide (MAXZIDE-25) 37.5-25 MG per tablet Take 1 tablet by mouth Daily.     • VITAMIN E PO Take 650 mg by mouth.         Patient has no known allergies.    Family History   Problem Relation Age of Onset   • Colon polyps Mother    • Heart disease Mother    • Thyroid cancer Sister    • Leukemia Sister        Social History     Social History   • Marital status:      Spouse name: N/A   • Number of children: N/A   • Years of education: N/A     Occupational History   • Not on file.     Social History Main Topics   • Smoking status: Former Smoker     Quit date: 5/12/2006   • Smokeless tobacco: Never Used   • Alcohol use Yes      Comment: occ   • Drug use: No   • Sexual activity: Yes     Partners: Male     Other Topics Concern   • Not on file     Social History Narrative   • No narrative on file        Review of Systems   Constitutional: Negative.    HENT:        SEE HPI   Eyes: Negative.    Respiratory: Negative.         Hoarseness   Cardiovascular: Negative.    Gastrointestinal: Negative.    Endocrine: Negative.    Genitourinary: Negative.    Musculoskeletal: Negative.    Skin: Negative.    Allergic/Immunologic: Negative.    Neurological: Negative.    Hematological: Negative.    Psychiatric/Behavioral: Negative.        Vitals:    08/03/18 0951   BP: 128/84   Temp: 97.5 °F (36.4 °C)       Body mass index is 24.51 kg/m².    Objective     Physical Exam  CONSTITUTIONAL: well nourished, alert, oriented, in no acute distress     COMMUNICATION AND VOICE: able to communicate normally, hoarseness    HEAD: normocephalic, no lesions, atraumatic, no tenderness, no masses     FACE: appearance normal, no lesions, no tenderness, no deformities, facial motion symmetric    SALIVARY GLANDS: parotid glands with no tenderness, no swelling, no masses, submandibular glands with normal size, nontender    EYES: ocular motility normal, eyelids normal, orbits normal, no proptosis, conjunctiva normal , pupils equal, round     EARS:  Hearing: response to conversational voice normal bilaterally   External Ears: auricles without lesions  Otoscopic: tympanic membrane appearance normal, no lesions, no perforation, normal mobility, no fluid    NOSE:  External Nose: structure normal, no tenderness on palpation, no nasal discharge, no lesions, no evidence of trauma, nostrils patent   Intranasal Exam: nasal mucosa normal, vestibule within normal limits, inferior turbinate normal, nasal septum midline     ORAL:  Lips: upper and lower lips without lesion   Teeth: dentition within normal limits for age   Gums: gingivae healthy   Oral Mucosa: oral mucosa normal, no mucosal lesions   Floor of Mouth: Warthin’s duct patent, mucosa normal  Tongue: lingual mucosa normal without lesions, normal tongue mobility   Palate: soft and hard palates with  normal mucosa and structure  Oropharynx: oropharyngeal mucosa normal    HYPOPHARYNX:   LARYNX: see scope    NECK: neck appearance normal, no mass,  noted without erythema or tenderness    THYROID: no overt thyromegaly, no tenderness, nodules or mass present on palpation, position midline     LYMPH NODES: no lymphadenopathy    CHEST/RESPIRATORY: respiratory effort normal    CARDIOVASCULAR:  extremities without cyanosis or edema      NEUROLOGIC/PSYCHIATRIC: oriented to time, place and person, mood normal, affect appropriate, CN II-XII intact grossly    Assessment/Plan   Earline was seen today for hoarse.    Diagnoses and all orders for this visit:    Laryngopharyngeal reflux (LPR)    Chronic sinusitis, unspecified location    Chronic laryngitis    History of smoking      * Surgery not found *  No orders of the defined types were placed in this encounter.    Return in about 8 weeks (around 9/28/2018).       Patient Instructions   Gastroesophageal Reflux Disease, Adult  Normally, food travels down the esophagus and stays in the stomach to be digested. However, when a person has gastroesophageal reflux disease (GERD), food and stomach acid move back up into the esophagus. When this happens, the esophagus becomes sore and inflamed. Over time, GERD can create small holes (ulcers) in the lining of the esophagus.  What are the causes?  This condition is caused by a problem with the muscle between the esophagus and the stomach (lower esophageal sphincter, or LES). Normally, the LES muscle closes after food passes through the esophagus to the stomach. When the LES is weakened or abnormal, it does not close properly, and that allows food and stomach acid to go back up into the esophagus. The LES can be weakened by certain dietary substances, medicines, and medical conditions, including:  · Tobacco use.  · Pregnancy.  · Having a hiatal hernia.  · Heavy alcohol use.  · Certain foods and beverages, such as coffee, chocolate, onions,  and peppermint.    What increases the risk?  This condition is more likely to develop in:  · People who have an increased body weight.  · People who have connective tissue disorders.  · People who use NSAID medicines.    What are the signs or symptoms?  Symptoms of this condition include:  · Heartburn.  · Difficult or painful swallowing.  · The feeling of having a lump in the throat.  · A bitter taste in the mouth.  · Bad breath.  · Having a large amount of saliva.  · Having an upset or bloated stomach.  · Belching.  · Chest pain.  · Shortness of breath or wheezing.  · Ongoing (chronic) cough or a night-time cough.  · Wearing away of tooth enamel.  · Weight loss.    Different conditions can cause chest pain. Make sure to see your health care provider if you experience chest pain.  How is this diagnosed?  Your health care provider will take a medical history and perform a physical exam. To determine if you have mild or severe GERD, your health care provider may also monitor how you respond to treatment. You may also have other tests, including:  · An endoscopy to examine your stomach and esophagus with a small camera.  · A test that measures the acidity level in your esophagus.  · A test that measures how much pressure is on your esophagus.  · A barium swallow or modified barium swallow to show the shape, size, and functioning of your esophagus.    How is this treated?  The goal of treatment is to help relieve your symptoms and to prevent complications. Treatment for this condition may vary depending on how severe your symptoms are. Your health care provider may recommend:  · Changes to your diet.  · Medicine.  · Surgery.    Follow these instructions at home:  Diet  · Follow a diet as recommended by your health care provider. This may involve avoiding foods and drinks such as:  ? Coffee and tea (with or without caffeine).  ? Drinks that contain alcohol.  ? Energy drinks and sports drinks.  ? Carbonated drinks or  sodas.  ? Chocolate and cocoa.  ? Peppermint and mint flavorings.  ? Garlic and onions.  ? Horseradish.  ? Spicy and acidic foods, including peppers, chili powder, scott powder, vinegar, hot sauces, and barbecue sauce.  ? Citrus fruit juices and citrus fruits, such as oranges, gaby, and limes.  ? Tomato-based foods, such as red sauce, chili, salsa, and pizza with red sauce.  ? Fried and fatty foods, such as donuts, french fries, potato chips, and high-fat dressings.  ? High-fat meats, such as hot dogs and fatty cuts of red and white meats, such as rib eye steak, sausage, ham, and montoya.  ? High-fat dairy items, such as whole milk, butter, and cream cheese.  · Eat small, frequent meals instead of large meals.  · Avoid drinking large amounts of liquid with your meals.  · Avoid eating meals during the 2-3 hours before bedtime.  · Avoid lying down right after you eat.  · Do not exercise right after you eat.  General instructions  · Pay attention to any changes in your symptoms.  · Take over-the-counter and prescription medicines only as told by your health care provider. Do not take aspirin, ibuprofen, or other NSAIDs unless your health care provider told you to do so.  · Do not use any tobacco products, including cigarettes, chewing tobacco, and e-cigarettes. If you need help quitting, ask your health care provider.  · Wear loose-fitting clothing. Do not wear anything tight around your waist that causes pressure on your abdomen.  · Raise (elevate) the head of your bed 6 inches (15cm).  · Try to reduce your stress, such as with yoga or meditation. If you need help reducing stress, ask your health care provider.  · If you are overweight, reduce your weight to an amount that is healthy for you. Ask your health care provider for guidance about a safe weight loss goal.  · Keep all follow-up visits as told by your health care provider. This is important.  Contact a health care provider if:  · You have new symptoms.  · You  have unexplained weight loss.  · You have difficulty swallowing, or it hurts to swallow.  · You have wheezing or a persistent cough.  · Your symptoms do not improve with treatment.  · You have a hoarse voice.  Get help right away if:  · You have pain in your arms, neck, jaw, teeth, or back.  · You feel sweaty, dizzy, or light-headed.  · You have chest pain or shortness of breath.  · You vomit and your vomit looks like blood or coffee grounds.  · You faint.  · Your stool is bloody or black.  · You cannot swallow, drink, or eat.  This information is not intended to replace advice given to you by your health care provider. Make sure you discuss any questions you have with your health care provider.  Document Released: 09/27/2006 Document Revised: 05/17/2017 Document Reviewed: 04/13/2016  [employer] Interactive Patient Education © 2018 [employer]      Consider repeating steroids - she defer at this time  Add Prevacid in the AM and take Zantac in PM  Consider changing from nicotine lozenges to patch  Call for problems or worsening symptoms

## 2018-09-11 NOTE — PROGRESS NOTES
Darian Morgan MD     Chief Complaint   Patient presents with   • Follow-up   • Ear Problem     rt ear, crusting, itching       HPI   Earline Salazar is a  68 y.o. female who is here for follow up. She was last seen in the office on 8/3/18 with hoarseness. The patient was treated with: laryngopharyngeal reflux precautions, reflux medication and increased water intake. She previously was seen for sinus problems that improved medically. She has had no current complaints. The patient has not had complaints of: neither nasal congestion, drainage, facial swelling, facial pain or sinusitis nor throat pain, feeling of something in the throat, voice change or trouble swallowing. She has had irritation of the right outer ear.    Review of Systems:  Reviewed per patient intake note    Past History:  Past medical and surgical history, family history and social history reviewed and updated when appropriate.  Current medications and allergies reviewed and updated when appropriate.  Allergies:  Patient has no known allergies.    Vital Signs:   Temp:  [97.6 °F (36.4 °C)] 97.6 °F (36.4 °C)  BP: (122)/(82) 122/82    Physical Exam   CONSTITUTIONAL: well nourished, well-developed, alert, oriented, in no acute distress   COMMUNICATION AND VOICE: able to communicate normally, normal voice quality  HEAD: normocephalic, no lesions, atraumatic, no tenderness, no masses   FACE: appearance normal, no lesions, no tenderness, no deformities, facial motion symmetric  EYES: ocular motility normal, eyelids normal, orbits normal, no proptosis, conjunctiva normal , pupils equal, round  HEARING: response to conversational voice normal bilaterally   EXTERNAL EARS: auricles without lesions  EXTERNAL EAR CANALS: normal ear canals without stenosis or significant cerumen  TYMPANIC MEMBRANES: tympanic membrane appearance normal, no lesions, no perforation, normal mobility, no fluid  EXTERNAL NOSE: structure normal, no tenderness on palpation, no  nasal discharge, no lesions, no evidence of trauma, nostrils patent  INTRANASAL EXAM: nasal mucosa with mucosal congestion and erythema, nasal septum without overt anterior deviation  LIPS: structure normal, no tenderness on palpation, no lesions, no evidence of trauma  NECK: neck appearance normal  LYMPH NODES: no lymphadenopathy  CHEST/RESPIRATORY: respiratory effort normal  CARDIOVASCULAR: extremities without cyanosis or edema, no overt jugulovenous distension present  NEUROLOGIC/PSYCHIATRIC: oriented appropriately for age, mood normal, affect appropriate, cranial nerves intact grossly unless specifically mentioned above         Assessment   1. Chronic sinusitis, unspecified location    2. Chronic laryngitis    3. Dermatitis        Plan    Continue current management plan.    No Follow-up on file.    Darian Morgan MD  09/12/18  12:06 PM

## 2018-09-12 ENCOUNTER — OFFICE VISIT (OUTPATIENT)
Dept: OTOLARYNGOLOGY | Facility: CLINIC | Age: 68
End: 2018-09-12

## 2018-09-12 VITALS
TEMPERATURE: 97.6 F | DIASTOLIC BLOOD PRESSURE: 82 MMHG | WEIGHT: 169.4 LBS | BODY MASS INDEX: 25.09 KG/M2 | HEIGHT: 69 IN | SYSTOLIC BLOOD PRESSURE: 122 MMHG

## 2018-09-12 DIAGNOSIS — L30.9 DERMATITIS: ICD-10-CM

## 2018-09-12 DIAGNOSIS — J32.9 CHRONIC SINUSITIS, UNSPECIFIED LOCATION: Primary | ICD-10-CM

## 2018-09-12 DIAGNOSIS — J37.0 CHRONIC LARYNGITIS: ICD-10-CM

## 2018-09-12 PROCEDURE — 99213 OFFICE O/P EST LOW 20 MIN: CPT | Performed by: OTOLARYNGOLOGY

## 2018-09-12 RX ORDER — UREA 10 %
1 LOTION (ML) TOPICAL NIGHTLY
COMMUNITY
End: 2020-12-02

## 2018-09-12 NOTE — PROGRESS NOTES
Cheryl Swain MA   Patient Intake Note    Review of Systems  Review of Systems   Constitutional: Negative for chills, fatigue and fever.   HENT:        See hpi   Respiratory: Positive for choking. Negative for cough, shortness of breath and wheezing.    Gastrointestinal: Negative for diarrhea, nausea and vomiting.   Allergic/Immunologic: Negative for environmental allergies and food allergies.   Neurological: Negative for dizziness, facial asymmetry and light-headedness.   Hematological: Bruises/bleeds easily.   Psychiatric/Behavioral: Positive for sleep disturbance.   All other systems reviewed and are negative.      QUALITY MEASURES    Body Mass Index Screening and Follow-Up Plan  Body mass index is 25.02 kg/m².  Patient's Body mass index is 25.02 kg/m². BMI is within normal parameters. No follow-up required.    Tobacco Use: Screening and Cessation Intervention  Smoking status: Former Smoker                                                              Packs/day: 0.00      Years: 0.00         Quit date: 5/12/2006  Smokeless tobacco: Never Used                            Cheryl Swain MA  9/12/2018  11:32 AM

## 2018-10-16 ENCOUNTER — HOSPITAL ENCOUNTER (OUTPATIENT)
Dept: WOMENS IMAGING | Age: 68
Discharge: HOME | End: 2018-10-16
Payer: MEDICARE

## 2018-10-16 DIAGNOSIS — Z12.31 ENCOUNTER FOR SCREENING MAMMOGRAM FOR BREAST CANCER: ICD-10-CM

## 2018-10-16 PROCEDURE — 77063 BREAST TOMOSYNTHESIS BI: CPT

## 2018-10-18 DIAGNOSIS — J01.81 OTHER ACUTE RECURRENT SINUSITIS: ICD-10-CM

## 2018-10-18 DIAGNOSIS — J32.8 OTHER CHRONIC SINUSITIS: ICD-10-CM

## 2018-10-18 RX ORDER — AZELASTINE 1 MG/ML
2 SPRAY, METERED NASAL DAILY
Qty: 1 EACH | Refills: 6 | Status: SHIPPED | OUTPATIENT
Start: 2018-10-18 | End: 2019-05-02 | Stop reason: SDUPTHER

## 2018-10-18 RX ORDER — FLUTICASONE PROPIONATE 50 MCG
2 SPRAY, SUSPENSION (ML) NASAL DAILY
Qty: 1 BOTTLE | Refills: 6 | Status: SHIPPED | OUTPATIENT
Start: 2018-10-18

## 2018-10-30 ENCOUNTER — APPOINTMENT (OUTPATIENT)
Dept: PREADMISSION TESTING | Facility: HOSPITAL | Age: 68
End: 2018-10-30

## 2018-10-30 VITALS
BODY MASS INDEX: 24.24 KG/M2 | HEART RATE: 72 BPM | SYSTOLIC BLOOD PRESSURE: 122 MMHG | WEIGHT: 169.31 LBS | DIASTOLIC BLOOD PRESSURE: 56 MMHG | RESPIRATION RATE: 18 BRPM | OXYGEN SATURATION: 98 % | HEIGHT: 70 IN

## 2018-10-30 LAB
ANION GAP SERPL CALCULATED.3IONS-SCNC: 12 MMOL/L (ref 4–13)
BACTERIA UR QL AUTO: ABNORMAL /HPF
BILIRUB UR QL STRIP: NEGATIVE
BUN BLD-MCNC: 21 MG/DL (ref 5–21)
BUN/CREAT SERPL: 24.4 (ref 7–25)
CALCIUM SPEC-SCNC: 9.7 MG/DL (ref 8.4–10.4)
CHLORIDE SERPL-SCNC: 102 MMOL/L (ref 98–110)
CLARITY UR: CLEAR
CO2 SERPL-SCNC: 28 MMOL/L (ref 24–31)
COLOR UR: YELLOW
CREAT BLD-MCNC: 0.86 MG/DL (ref 0.5–1.4)
DEPRECATED RDW RBC AUTO: 39.8 FL (ref 40–54)
ERYTHROCYTE [DISTWIDTH] IN BLOOD BY AUTOMATED COUNT: 11.9 % (ref 12–15)
GFR SERPL CREATININE-BSD FRML MDRD: 66 ML/MIN/1.73
GLUCOSE BLD-MCNC: 95 MG/DL (ref 70–100)
GLUCOSE UR STRIP-MCNC: NEGATIVE MG/DL
HCT VFR BLD AUTO: 42.7 % (ref 37–47)
HGB BLD-MCNC: 14.9 G/DL (ref 12–16)
HGB UR QL STRIP.AUTO: NEGATIVE
HYALINE CASTS UR QL AUTO: ABNORMAL /LPF
KETONES UR QL STRIP: NEGATIVE
LEUKOCYTE ESTERASE UR QL STRIP.AUTO: ABNORMAL
MCH RBC QN AUTO: 32 PG (ref 28–32)
MCHC RBC AUTO-ENTMCNC: 34.9 G/DL (ref 33–36)
MCV RBC AUTO: 91.8 FL (ref 82–98)
NITRITE UR QL STRIP: NEGATIVE
PH UR STRIP.AUTO: 6.5 [PH] (ref 5–8)
PLATELET # BLD AUTO: 245 10*3/MM3 (ref 130–400)
PMV BLD AUTO: 10.2 FL (ref 6–12)
POTASSIUM BLD-SCNC: 4.9 MMOL/L (ref 3.5–5.3)
PROT UR QL STRIP: NEGATIVE
RBC # BLD AUTO: 4.65 10*6/MM3 (ref 4.2–5.4)
RBC # UR: ABNORMAL /HPF
REF LAB TEST METHOD: ABNORMAL
SODIUM BLD-SCNC: 142 MMOL/L (ref 135–145)
SP GR UR STRIP: <=1.005 (ref 1–1.03)
SQUAMOUS #/AREA URNS HPF: ABNORMAL /HPF
UROBILINOGEN UR QL STRIP: ABNORMAL
WBC NRBC COR # BLD: 6.06 10*3/MM3 (ref 4.8–10.8)
WBC UR QL AUTO: ABNORMAL /HPF

## 2018-10-30 PROCEDURE — 87088 URINE BACTERIA CULTURE: CPT | Performed by: PODIATRIST

## 2018-10-30 PROCEDURE — 93005 ELECTROCARDIOGRAM TRACING: CPT

## 2018-10-30 PROCEDURE — 85027 COMPLETE CBC AUTOMATED: CPT | Performed by: PODIATRIST

## 2018-10-30 PROCEDURE — 36415 COLL VENOUS BLD VENIPUNCTURE: CPT

## 2018-10-30 PROCEDURE — 93010 ELECTROCARDIOGRAM REPORT: CPT | Performed by: INTERNAL MEDICINE

## 2018-10-30 PROCEDURE — 87086 URINE CULTURE/COLONY COUNT: CPT | Performed by: PODIATRIST

## 2018-10-30 PROCEDURE — 87186 SC STD MICRODIL/AGAR DIL: CPT | Performed by: PODIATRIST

## 2018-10-30 PROCEDURE — 81001 URINALYSIS AUTO W/SCOPE: CPT | Performed by: PODIATRIST

## 2018-10-30 PROCEDURE — 80048 BASIC METABOLIC PNL TOTAL CA: CPT | Performed by: PODIATRIST

## 2018-11-01 LAB — BACTERIA SPEC AEROBE CULT: ABNORMAL

## 2018-11-04 ENCOUNTER — HOSPITAL ENCOUNTER (EMERGENCY)
Facility: HOSPITAL | Age: 68
Discharge: HOME | End: 2018-11-04
Admitting: EMERGENCY MEDICINE

## 2018-11-04 ENCOUNTER — APPOINTMENT (OUTPATIENT)
Dept: CT IMAGING | Facility: HOSPITAL | Age: 68
End: 2018-11-04

## 2018-11-04 VITALS
HEART RATE: 72 BPM | HEIGHT: 69 IN | OXYGEN SATURATION: 95 % | WEIGHT: 171.38 LBS | BODY MASS INDEX: 25.38 KG/M2 | DIASTOLIC BLOOD PRESSURE: 60 MMHG | SYSTOLIC BLOOD PRESSURE: 101 MMHG | RESPIRATION RATE: 16 BRPM | TEMPERATURE: 97.2 F

## 2018-11-04 DIAGNOSIS — S39.012A STRAIN OF LUMBAR REGION, INITIAL ENCOUNTER: Primary | ICD-10-CM

## 2018-11-04 PROCEDURE — 72131 CT LUMBAR SPINE W/O DYE: CPT

## 2018-11-04 PROCEDURE — 99284 EMERGENCY DEPT VISIT MOD MDM: CPT

## 2018-11-04 RX ORDER — OXYCODONE AND ACETAMINOPHEN 7.5; 325 MG/1; MG/1
1 TABLET ORAL ONCE
Status: COMPLETED | OUTPATIENT
Start: 2018-11-04 | End: 2018-11-04

## 2018-11-04 RX ORDER — CYCLOBENZAPRINE HCL 10 MG
10 TABLET ORAL 3 TIMES DAILY PRN
Qty: 12 TABLET | Refills: 0 | Status: SHIPPED | OUTPATIENT
Start: 2018-11-04 | End: 2023-03-08

## 2018-11-04 RX ORDER — ONDANSETRON 4 MG/1
4 TABLET, ORALLY DISINTEGRATING ORAL ONCE
Status: COMPLETED | OUTPATIENT
Start: 2018-11-04 | End: 2018-11-04

## 2018-11-04 RX ADMIN — OXYCODONE HYDROCHLORIDE AND ACETAMINOPHEN 1 TABLET: 7.5; 325 TABLET ORAL at 08:18

## 2018-11-04 RX ADMIN — ONDANSETRON 4 MG: 4 TABLET, ORALLY DISINTEGRATING ORAL at 08:18

## 2018-11-04 NOTE — ED PROVIDER NOTES
Subjective   68-year-old  female with past medical history of chronic low back pain presents to the emergency department with acute on chronic worsening low back pain.  Patient reports onset of symptoms 3 days ago but worsening over the last 24 hours.  Patient reports she saw her chiropractor on Thursday and fell he press exceptionally hard on her lower back and she has had increased pain since that moment.  Patient does report difference and sciatica symptoms.  Patient normally has sciatica down the left side but now has additional sciatica on the right side in the posterior thigh region.  Negative associated symptoms include fever, chills, bowel or bladder incontinence/retention, saddle anesthesia.  Patient reports standing and walking improved symptoms while laying on her back worsened symptoms.  Patient states that she is unable to take her usual medications such as NSAIDs as she is undergoing surgery in the next 2 days and was told not to thin her blood.        History provided by:  Patient   used: No        Review of Systems   Constitutional: Negative for chills and fever.   HENT: Negative for congestion and sore throat.    Respiratory: Negative for cough and shortness of breath.    Cardiovascular: Negative for chest pain and palpitations.   Gastrointestinal: Negative for abdominal pain, nausea and vomiting.   Genitourinary: Negative for dysuria and hematuria.   Musculoskeletal: Positive for back pain. Negative for arthralgias and neck pain.   Skin: Negative for rash and wound.   Neurological: Negative for dizziness, weakness and headaches.   Hematological: Negative for adenopathy. Does not bruise/bleed easily.       Past Medical History:   Diagnosis Date   • Allergic rhinitis    • Arthralgia    • Arthritis    • GERD (gastroesophageal reflux disease)    • Hammer toe    • Hyperlipidemia    • Nail avulsion, toe    • Schatzki's ring    • Swelling    • Vaginal bleeding        No Known  Allergies    Past Surgical History:   Procedure Laterality Date   • APPENDECTOMY     • COLONOSCOPY  03/10/2016   • ENDOSCOPY N/A 5/12/2017    Procedure: ESOPHAGOGASTRODUODENOSCOPY WITH ANESTHESIA;  Surgeon: Xochitl Reyse MD;  Location: [facility] ENDOSCOPY;  Service:    • HYSTERECTOMY     • KNEE ARTHROSCOPY W/ MENISCAL REPAIR Left    • PATENT FORAMEN OVALE CLOSURE  2014   • TONSILLECTOMY     • UPPER GASTROINTESTINAL ENDOSCOPY  03/10/2016       Family History   Problem Relation Age of Onset   • Colon polyps Mother    • Heart disease Mother    • Thyroid cancer Sister    • Leukemia Sister        Social History     Social History   • Marital status:      Social History Main Topics   • Smoking status: Former Smoker     Quit date: 5/12/2006   • Smokeless tobacco: Never Used   • Alcohol use Yes      Comment: occ   • Drug use: No   • Sexual activity: Defer     Other Topics Concern   • Not on file       Lab Results (last 24 hours)     ** No results found for the last 24 hours. **          Objective   Physical Exam   Constitutional: She is oriented to person, place, and time. She appears well-developed and well-nourished. No distress.   HENT:   Head: Normocephalic and atraumatic.   Right Ear: External ear normal.   Left Ear: External ear normal.   Mouth/Throat: Oropharynx is clear and moist.   Eyes: Pupils are equal, round, and reactive to light. Conjunctivae and EOM are normal.   Neck: Normal range of motion.   Cardiovascular: Normal rate, regular rhythm, normal heart sounds and intact distal pulses.  Exam reveals no friction rub.    No murmur heard.  Pulmonary/Chest: Effort normal and breath sounds normal. No respiratory distress. She has no wheezes. She has no rales. She exhibits no tenderness.   Abdominal: Soft. Bowel sounds are normal. She exhibits no distension and no mass. There is no tenderness. There is no rebound and no guarding.   Musculoskeletal: Normal range of motion.        Lumbar back: She exhibits  "tenderness, bony tenderness and pain.   Midline lumbar tenderness, normal rectal tone.  No obvious deformities, step-offs, signs of infection.  Patient has normal gait.  Abdomen is soft and nontender.  Full sensation distally. 5/5 strength BLE. Normal pulses.    Neurological: She is alert and oriented to person, place, and time.   Skin: Skin is warm and dry. Capillary refill takes less than 2 seconds. She is not diaphoretic.   Psychiatric: She has a normal mood and affect. Her behavior is normal.   Nursing note and vitals reviewed.      Procedures         CT Lumbar Spine Without Contrast   Final Result   Impression:    1. Degenerative spondylosis is noted the L3-4, L4-5 and L5-S1 lumbar   disc spaces..           This report was finalized on 11/04/2018 09:00 by Dr. Garrison Ramos MD.          /52   Pulse 70   Temp 97.2 °F (36.2 °C)   Resp 16   Ht 175.3 cm (69\")   Wt 77.7 kg (171 lb 6 oz)   SpO2 97%   BMI 25.31 kg/m²     ED Course    ED Course as of Nov 04 0949   Sun Nov 04, 2018   0919 Impression     Impression:   1. Degenerative spondylosis is noted the L3-4, L4-5 and L5-S1 lumbar  disc spaces..        This report was finalized on 11/04/2018 09:00 by Dr. Garrison Ramos MD.      [CP]      ED Course User Index  [CP] Adolfo Resendez PA-C       Medications   oxyCODONE-acetaminophen (PERCOCET) 7.5-325 MG per tablet 1 tablet (1 tablet Oral Given 11/4/18 0818)   ondansetron ODT (ZOFRAN-ODT) disintegrating tablet 4 mg (4 mg Oral Given 11/4/18 0818)            MDM  Number of Diagnoses or Management Options  Strain of lumbar region, initial encounter: new and requires workup  Diagnosis management comments: C8-year-old with acute on chronic exacerbation of low back pain.  Symptoms began after manipulation during chiropractor appointment.  Significant worsening of pain with bilateral sciatica symptoms when she originally only had left-sided symptoms.  CT scan is unremarkable other than degenerative " spondylosis.  Normal rectal tone, no fever, no chills, no bowel or bladder retention/incontinence.  Pain suddenly improved with pain medication here.  Difficulty managing pain as patient does have upcoming surgery in 48 hours and was told not to take her NSAIDs that she normally takes.  Patient reassured by CT scan results.  We will give muscle relaxers and informed patient to not take Monday of surgery and to remain nothing by mouth as directed by the surgeon.  Discussed return precautions and all results.  Patient verbally agrees to plan at this time.       Amount and/or Complexity of Data Reviewed  Tests in the radiology section of CPT®: reviewed and ordered    Risk of Complications, Morbidity, and/or Mortality  Presenting problems: moderate  Diagnostic procedures: moderate  Management options: moderate    Patient Progress  Patient progress: improved      Final diagnoses:   Strain of lumbar region, initial encounter          Adolfo Resendez PA-C  11/04/18 0949

## 2018-11-06 ENCOUNTER — APPOINTMENT (OUTPATIENT)
Dept: GENERAL RADIOLOGY | Facility: HOSPITAL | Age: 68
End: 2018-11-06

## 2018-11-06 ENCOUNTER — HOSPITAL ENCOUNTER (OUTPATIENT)
Facility: HOSPITAL | Age: 68
Setting detail: HOSPITAL OUTPATIENT SURGERY
Discharge: HOME | End: 2018-11-06
Attending: PODIATRIST | Admitting: PODIATRIST

## 2018-11-06 ENCOUNTER — ANESTHESIA EVENT (OUTPATIENT)
Dept: PERIOP | Facility: HOSPITAL | Age: 68
End: 2018-11-06

## 2018-11-06 ENCOUNTER — ANESTHESIA (OUTPATIENT)
Dept: PERIOP | Facility: HOSPITAL | Age: 68
End: 2018-11-06

## 2018-11-06 VITALS
SYSTOLIC BLOOD PRESSURE: 122 MMHG | OXYGEN SATURATION: 99 % | TEMPERATURE: 97.2 F | HEART RATE: 77 BPM | RESPIRATION RATE: 18 BRPM | DIASTOLIC BLOOD PRESSURE: 60 MMHG

## 2018-11-06 DIAGNOSIS — L60.9 NAIL DISORDER: ICD-10-CM

## 2018-11-06 PROCEDURE — C1713 ANCHOR/SCREW BN/BN,TIS/BN: HCPCS | Performed by: PODIATRIST

## 2018-11-06 PROCEDURE — 73620 X-RAY EXAM OF FOOT: CPT

## 2018-11-06 PROCEDURE — C1776 JOINT DEVICE (IMPLANTABLE): HCPCS | Performed by: PODIATRIST

## 2018-11-06 PROCEDURE — 76000 FLUOROSCOPY <1 HR PHYS/QHP: CPT

## 2018-11-06 PROCEDURE — 25010000002 KETOROLAC TROMETHAMINE PER 15 MG: Performed by: NURSE ANESTHETIST, CERTIFIED REGISTERED

## 2018-11-06 PROCEDURE — 25010000002 FENTANYL CITRATE (PF) 100 MCG/2ML SOLUTION: Performed by: NURSE ANESTHETIST, CERTIFIED REGISTERED

## 2018-11-06 PROCEDURE — 88305 TISSUE EXAM BY PATHOLOGIST: CPT | Performed by: PODIATRIST

## 2018-11-06 PROCEDURE — 25010000002 DEXAMETHASONE PER 1 MG: Performed by: ANESTHESIOLOGY

## 2018-11-06 PROCEDURE — 25010000002 PROPOFOL 10 MG/ML EMULSION: Performed by: NURSE ANESTHETIST, CERTIFIED REGISTERED

## 2018-11-06 PROCEDURE — 25010000002 ROPIVACAINE PER 1 MG: Performed by: PODIATRIST

## 2018-11-06 PROCEDURE — 25010000002 MIDAZOLAM PER 1 MG: Performed by: ANESTHESIOLOGY

## 2018-11-06 PROCEDURE — 25010000002 DEXAMETHASONE PER 1 MG: Performed by: NURSE ANESTHETIST, CERTIFIED REGISTERED

## 2018-11-06 PROCEDURE — 25010000002 ONDANSETRON PER 1 MG: Performed by: NURSE ANESTHETIST, CERTIFIED REGISTERED

## 2018-11-06 DEVICE — KWIRE SMOTH DBL/TROC .062X9IN: Type: IMPLANTABLE DEVICE | Status: FUNCTIONAL

## 2018-11-06 DEVICE — CARTILAGE MTP SYNTH CARTIVA 1PC 10X10MM: Type: IMPLANTABLE DEVICE | Status: FUNCTIONAL

## 2018-11-06 RX ORDER — IPRATROPIUM BROMIDE AND ALBUTEROL SULFATE 2.5; .5 MG/3ML; MG/3ML
3 SOLUTION RESPIRATORY (INHALATION) ONCE AS NEEDED
Status: DISCONTINUED | OUTPATIENT
Start: 2018-11-06 | End: 2018-11-06 | Stop reason: HOSPADM

## 2018-11-06 RX ORDER — FENTANYL CITRATE 50 UG/ML
25 INJECTION, SOLUTION INTRAMUSCULAR; INTRAVENOUS AS NEEDED
Status: DISCONTINUED | OUTPATIENT
Start: 2018-11-06 | End: 2018-11-06 | Stop reason: HOSPADM

## 2018-11-06 RX ORDER — SODIUM CHLORIDE, SODIUM LACTATE, POTASSIUM CHLORIDE, CALCIUM CHLORIDE 600; 310; 30; 20 MG/100ML; MG/100ML; MG/100ML; MG/100ML
100 INJECTION, SOLUTION INTRAVENOUS CONTINUOUS PRN
Status: DISCONTINUED | OUTPATIENT
Start: 2018-11-06 | End: 2018-11-06 | Stop reason: HOSPADM

## 2018-11-06 RX ORDER — SODIUM CHLORIDE 0.9 % (FLUSH) 0.9 %
3-10 SYRINGE (ML) INJECTION AS NEEDED
Status: DISCONTINUED | OUTPATIENT
Start: 2018-11-06 | End: 2018-11-06 | Stop reason: HOSPADM

## 2018-11-06 RX ORDER — ROPIVACAINE HYDROCHLORIDE 5 MG/ML
INJECTION, SOLUTION EPIDURAL; INFILTRATION; PERINEURAL AS NEEDED
Status: DISCONTINUED | OUTPATIENT
Start: 2018-11-06 | End: 2018-11-06 | Stop reason: HOSPADM

## 2018-11-06 RX ORDER — ONDANSETRON 2 MG/ML
INJECTION INTRAMUSCULAR; INTRAVENOUS AS NEEDED
Status: DISCONTINUED | OUTPATIENT
Start: 2018-11-06 | End: 2018-11-06 | Stop reason: SURG

## 2018-11-06 RX ORDER — ACETAMINOPHEN 500 MG
1000 TABLET ORAL ONCE
Status: COMPLETED | OUTPATIENT
Start: 2018-11-06 | End: 2018-11-06

## 2018-11-06 RX ORDER — SODIUM CHLORIDE 0.9 % (FLUSH) 0.9 %
1-10 SYRINGE (ML) INJECTION AS NEEDED
Status: DISCONTINUED | OUTPATIENT
Start: 2018-11-06 | End: 2018-11-06 | Stop reason: HOSPADM

## 2018-11-06 RX ORDER — OXYCODONE AND ACETAMINOPHEN 10; 325 MG/1; MG/1
1 TABLET ORAL ONCE AS NEEDED
Status: DISCONTINUED | OUTPATIENT
Start: 2018-11-06 | End: 2018-11-06 | Stop reason: HOSPADM

## 2018-11-06 RX ORDER — MIDAZOLAM HYDROCHLORIDE 1 MG/ML
2 INJECTION INTRAMUSCULAR; INTRAVENOUS
Status: DISCONTINUED | OUTPATIENT
Start: 2018-11-06 | End: 2018-11-06 | Stop reason: HOSPADM

## 2018-11-06 RX ORDER — SODIUM CHLORIDE, SODIUM LACTATE, POTASSIUM CHLORIDE, CALCIUM CHLORIDE 600; 310; 30; 20 MG/100ML; MG/100ML; MG/100ML; MG/100ML
100 INJECTION, SOLUTION INTRAVENOUS CONTINUOUS
Status: DISCONTINUED | OUTPATIENT
Start: 2018-11-06 | End: 2018-11-06 | Stop reason: HOSPADM

## 2018-11-06 RX ORDER — LABETALOL HYDROCHLORIDE 5 MG/ML
5 INJECTION, SOLUTION INTRAVENOUS
Status: DISCONTINUED | OUTPATIENT
Start: 2018-11-06 | End: 2018-11-06 | Stop reason: HOSPADM

## 2018-11-06 RX ORDER — IBUPROFEN 600 MG/1
600 TABLET ORAL ONCE AS NEEDED
Status: DISCONTINUED | OUTPATIENT
Start: 2018-11-06 | End: 2018-11-06 | Stop reason: HOSPADM

## 2018-11-06 RX ORDER — FENTANYL CITRATE 50 UG/ML
INJECTION, SOLUTION INTRAMUSCULAR; INTRAVENOUS AS NEEDED
Status: DISCONTINUED | OUTPATIENT
Start: 2018-11-06 | End: 2018-11-06 | Stop reason: SURG

## 2018-11-06 RX ORDER — MEPERIDINE HYDROCHLORIDE 25 MG/ML
12.5 INJECTION INTRAMUSCULAR; INTRAVENOUS; SUBCUTANEOUS
Status: DISCONTINUED | OUTPATIENT
Start: 2018-11-06 | End: 2018-11-06 | Stop reason: HOSPADM

## 2018-11-06 RX ORDER — ONDANSETRON 4 MG/1
4 TABLET, FILM COATED ORAL EVERY 4 HOURS
Qty: 60 TABLET | Refills: 0 | Status: SHIPPED | OUTPATIENT
Start: 2018-11-06 | End: 2020-09-02

## 2018-11-06 RX ORDER — OXYCODONE AND ACETAMINOPHEN 7.5; 325 MG/1; MG/1
2 TABLET ORAL ONCE AS NEEDED
Status: DISCONTINUED | OUTPATIENT
Start: 2018-11-06 | End: 2018-11-06 | Stop reason: HOSPADM

## 2018-11-06 RX ORDER — FAMOTIDINE 10 MG/ML
20 INJECTION, SOLUTION INTRAVENOUS
Status: DISCONTINUED | OUTPATIENT
Start: 2018-11-06 | End: 2018-11-06 | Stop reason: HOSPADM

## 2018-11-06 RX ORDER — SODIUM CHLORIDE 0.9 % (FLUSH) 0.9 %
3 SYRINGE (ML) INJECTION EVERY 12 HOURS SCHEDULED
Status: DISCONTINUED | OUTPATIENT
Start: 2018-11-06 | End: 2018-11-06 | Stop reason: HOSPADM

## 2018-11-06 RX ORDER — OXYCODONE AND ACETAMINOPHEN 7.5; 325 MG/1; MG/1
1 TABLET ORAL ONCE AS NEEDED
Status: DISCONTINUED | OUTPATIENT
Start: 2018-11-06 | End: 2018-11-06 | Stop reason: HOSPADM

## 2018-11-06 RX ORDER — ONDANSETRON 2 MG/ML
4 INJECTION INTRAMUSCULAR; INTRAVENOUS ONCE AS NEEDED
Status: DISCONTINUED | OUTPATIENT
Start: 2018-11-06 | End: 2018-11-06 | Stop reason: HOSPADM

## 2018-11-06 RX ORDER — LIDOCAINE HYDROCHLORIDE 20 MG/ML
INJECTION, SOLUTION INFILTRATION; PERINEURAL AS NEEDED
Status: DISCONTINUED | OUTPATIENT
Start: 2018-11-06 | End: 2018-11-06 | Stop reason: SURG

## 2018-11-06 RX ORDER — NITROFURANTOIN 25; 75 MG/1; MG/1
100 CAPSULE ORAL 2 TIMES DAILY
COMMUNITY
End: 2020-09-02

## 2018-11-06 RX ORDER — OXYCODONE AND ACETAMINOPHEN 10; 325 MG/1; MG/1
1 TABLET ORAL EVERY 4 HOURS PRN
Qty: 60 TABLET | Refills: 0 | Status: SHIPPED | OUTPATIENT
Start: 2018-11-06 | End: 2020-09-02

## 2018-11-06 RX ORDER — KETOROLAC TROMETHAMINE 30 MG/ML
INJECTION, SOLUTION INTRAMUSCULAR; INTRAVENOUS AS NEEDED
Status: DISCONTINUED | OUTPATIENT
Start: 2018-11-06 | End: 2018-11-06 | Stop reason: SURG

## 2018-11-06 RX ORDER — PROPOFOL 10 MG/ML
VIAL (ML) INTRAVENOUS AS NEEDED
Status: DISCONTINUED | OUTPATIENT
Start: 2018-11-06 | End: 2018-11-06 | Stop reason: SURG

## 2018-11-06 RX ORDER — DEXAMETHASONE SODIUM PHOSPHATE 4 MG/ML
4 INJECTION, SOLUTION INTRA-ARTICULAR; INTRALESIONAL; INTRAMUSCULAR; INTRAVENOUS; SOFT TISSUE ONCE AS NEEDED
Status: COMPLETED | OUTPATIENT
Start: 2018-11-06 | End: 2018-11-06

## 2018-11-06 RX ORDER — NALOXONE HCL 0.4 MG/ML
0.4 VIAL (ML) INJECTION AS NEEDED
Status: DISCONTINUED | OUTPATIENT
Start: 2018-11-06 | End: 2018-11-06 | Stop reason: HOSPADM

## 2018-11-06 RX ORDER — MAGNESIUM HYDROXIDE 1200 MG/15ML
LIQUID ORAL AS NEEDED
Status: DISCONTINUED | OUTPATIENT
Start: 2018-11-06 | End: 2018-11-06 | Stop reason: HOSPADM

## 2018-11-06 RX ORDER — METOCLOPRAMIDE HYDROCHLORIDE 5 MG/ML
5 INJECTION INTRAMUSCULAR; INTRAVENOUS
Status: DISCONTINUED | OUTPATIENT
Start: 2018-11-06 | End: 2018-11-06 | Stop reason: HOSPADM

## 2018-11-06 RX ORDER — OXYCODONE HYDROCHLORIDE AND ACETAMINOPHEN 5; 325 MG/1; MG/1
1 TABLET ORAL EVERY 6 HOURS PRN
COMMUNITY
End: 2018-11-06 | Stop reason: HOSPADM

## 2018-11-06 RX ORDER — DEXAMETHASONE SODIUM PHOSPHATE 4 MG/ML
INJECTION, SOLUTION INTRA-ARTICULAR; INTRALESIONAL; INTRAMUSCULAR; INTRAVENOUS; SOFT TISSUE AS NEEDED
Status: DISCONTINUED | OUTPATIENT
Start: 2018-11-06 | End: 2018-11-06 | Stop reason: SURG

## 2018-11-06 RX ORDER — MIDAZOLAM HYDROCHLORIDE 1 MG/ML
1 INJECTION INTRAMUSCULAR; INTRAVENOUS
Status: DISCONTINUED | OUTPATIENT
Start: 2018-11-06 | End: 2018-11-06 | Stop reason: HOSPADM

## 2018-11-06 RX ADMIN — PROPOFOL 150 MG: 10 INJECTION, EMULSION INTRAVENOUS at 07:07

## 2018-11-06 RX ADMIN — SODIUM CHLORIDE, POTASSIUM CHLORIDE, SODIUM LACTATE AND CALCIUM CHLORIDE 100 ML/HR: 600; 310; 30; 20 INJECTION, SOLUTION INTRAVENOUS at 05:57

## 2018-11-06 RX ADMIN — ONDANSETRON HYDROCHLORIDE 4 MG: 2 SOLUTION INTRAMUSCULAR; INTRAVENOUS at 07:15

## 2018-11-06 RX ADMIN — LIDOCAINE HYDROCHLORIDE 50 MG: 20 INJECTION, SOLUTION INFILTRATION; PERINEURAL at 07:07

## 2018-11-06 RX ADMIN — FENTANYL CITRATE 100 MCG: 50 INJECTION, SOLUTION INTRAMUSCULAR; INTRAVENOUS at 07:07

## 2018-11-06 RX ADMIN — KETOROLAC TROMETHAMINE 30 MG: 30 INJECTION, SOLUTION INTRAMUSCULAR at 07:52

## 2018-11-06 RX ADMIN — LIDOCAINE HYDROCHLORIDE 0.5 ML: 10 INJECTION, SOLUTION EPIDURAL; INFILTRATION; INTRACAUDAL; PERINEURAL at 05:54

## 2018-11-06 RX ADMIN — CEFAZOLIN 1 G: 1 INJECTION, POWDER, FOR SOLUTION INTRAMUSCULAR; INTRAVENOUS; PARENTERAL at 07:09

## 2018-11-06 RX ADMIN — DEXAMETHASONE SODIUM PHOSPHATE 4 MG: 4 INJECTION, SOLUTION INTRA-ARTICULAR; INTRALESIONAL; INTRAMUSCULAR; INTRAVENOUS; SOFT TISSUE at 06:47

## 2018-11-06 RX ADMIN — DEXAMETHASONE SODIUM PHOSPHATE 4 MG: 4 INJECTION, SOLUTION INTRAMUSCULAR; INTRAVENOUS at 07:15

## 2018-11-06 RX ADMIN — MIDAZOLAM HYDROCHLORIDE 2 MG: 1 INJECTION, SOLUTION INTRAMUSCULAR; INTRAVENOUS at 06:46

## 2018-11-06 RX ADMIN — SODIUM CHLORIDE, POTASSIUM CHLORIDE, SODIUM LACTATE AND CALCIUM CHLORIDE 100 ML/HR: 600; 310; 30; 20 INJECTION, SOLUTION INTRAVENOUS at 08:17

## 2018-11-06 RX ADMIN — ACETAMINOPHEN 1000 MG: 500 TABLET, FILM COATED ORAL at 06:46

## 2018-11-06 RX ADMIN — FAMOTIDINE 20 MG: 10 INJECTION, SOLUTION INTRAVENOUS at 06:47

## 2018-11-06 NOTE — ANESTHESIA POSTPROCEDURE EVALUATION
Patient: Earline Salazar    Procedure Summary     Date:  11/06/18 Room / Location:  [facility] OR [address] [place] [address] [facility]    Anesthesia Start:  0705 Anesthesia Stop:  0758    Procedures:       TOE NAIL AVULSION WITH NAIL BIOPSY- RIGHT FOOT (Right Toes)      HAMMERTOE REPAIR WITH PINNING 2nd DIGIT, CARTIVA IMPLANT (Right Toes) Diagnosis:       Primary osteoarthritis of right foot      Hallux rigidus, right foot      Hammertoe of right foot      Pain, foot      Nail abnormalities      Nail discoloration      Nail disorder, unspecified      (PRIMARY OSTEOARTHRITIS, HAMMERTOE, NEOPLASM, NAIL LESION)    Surgeon:  Pk East DPM Provider:  RADHA Gallagher CRNA    Anesthesia Type:  general ASA Status:  2          Anesthesia Type: general  Last vitals  BP   99/77 (11/06/18 0833)   Temp   97.2 °F (36.2 °C) (11/06/18 0816)   Pulse   84 (11/06/18 0833)   Resp   16 (11/06/18 0833)     SpO2   96 % (11/06/18 0833)     Post Anesthesia Care and Evaluation    Patient location during evaluation: PACU  Patient participation: complete - patient participated  Level of consciousness: awake and alert  Pain management: adequate  Airway patency: patent  Anesthetic complications: No anesthetic complications  PONV Status: none  Cardiovascular status: acceptable and hemodynamically stable  Respiratory status: acceptable  Hydration status: acceptable    Comments: Blood pressure 99/77, pulse 84, temperature 97.2 °F (36.2 °C), resp. rate 16, SpO2 96 %, not currently breastfeeding.    Patient discharged from PACU based upon Veronica score. Please see RN notes for further details

## 2018-11-06 NOTE — OP NOTE
Cheilectomy with implant, TOE NAIL AVULSION, HAMMER TOE REPAIR  Procedure Note    Earline Salazar  11/6/2018    Pre-op Diagnosis:   PRIMARY OSTEOARTHRITIS, HAMMERTOE, NEOPLASM, NAIL LESION  * Primary osteoarthritis of right foot [M19.071]     * Hallux rigidus, right foot [M20.21]     * Hammertoe of right foot [M20.41]     * Pain, foot [M79.673]     * Nail abnormalities [L60.9]     * Nail discoloration [L60.8]     * Nail disorder, unspecified [L60.9]    Post-op Diagnosis:     Post-Op Diagnosis Codes:     * Primary osteoarthritis of right foot [M19.071]     * Hallux rigidus, right foot [M20.21]     * Hammertoe of right foot [M20.41]     * Pain, foot [M79.673]     * Nail abnormalities [L60.9]     * Nail discoloration [L60.8]     * Nail disorder, unspecified [L60.9]    Procedure/CPT® Codes:      Procedure(s):  1) Cheilectomy with cartiva implant  2)  TOE NAIL AVULSION WITH NAIL BIOPSY- RIGHT FOOT  3)  HAMMERTOE REPAIR WITH PINNING 2nd DIGIT,     Surgeon(s):  Pk East DPM    Anesthesia: General    Staff:   Circulator: Pau Michelle RN  Scrub Person: Darian Gerard  Assistant: Cornel Bliss    Indications for procedure:  pain    Procedure details:  The patient was brought in the operating room and placed under general anesthesia.    Procedure #1 right hallux total nail avulsion with biopsy    Attention was directed to the patient's right hallux nail plate.  A hemostat was utilized to free the nail plate from the nailbed was avulsed in toto.  The discolored lesion on the nail plate did not extend to the nailbed.  The nail plate was sent fresh to pathology for gross histologic examination.      An ankle block was performed with 30 mL 0.5% Naropin plain.  Right leg prepped and draped in the usual sterile fashion.    Procedure #2 cheilectomy with Cartiva implant first metatarsophalangeal joint right    Attention was directed to the first metatarsal phalangeal joint where an approximate 6 cm linear incision  was made.  It was deepened utilizing sharp and blunt dissection technique.  A linear capsular incision was made.  First metatarsophalangeal joint was exposed.  There was significant articular cartilage erosion on both the head of the metatarsalsthe proximal phalanx.  Sagittal saw was utilized resect prominent bone from the head of the first metatarsal.  A guidewire was then placed and x-ray examination was performed.  The reamer was was then utilized leaving at least an additional millimeter prior to the stop on the drill.  This was removed.  Wound is irrigated.  Care was taken to ensure the cavity for the implant was clean.  A 10 x 10 Cartiva implant was then introduced.  Excellent range of motion was appreciated as well as distraction of the joint.  Closure performed in layers.    Procedure #3 hammertoe repair pinning second digit right foot    Attention was directed to the distal interphalangeal joint second digit right.  2 converging Janak scissors were placed transversely across the joint.  A linear capsular and periosteal incision was made.  A sagittal saw was utilized resect portion of the head of the middle phalanx and resected articular cartilage in the base the middle phalanx.  0.062 K wire was then introduced into the base of the distal phalanx and out the distal aspect of the digit.  It was then driven in retrograde fashion into the middle and proximal phalanx.  Then bent cut and capped.  Closure performed in layers.    Sterile bandage applied.          Estimated Blood Loss: none    Specimens:                ID Type Source Tests Collected by Time   A : RIGHT GREAT TOE NAIL Tissue Toe, Right TISSUE PATHOLOGY EXAM Pk East DPM 11/6/2018 0732         Drains:  none    Implants:   Implant Name Type Inv. Item Serial No.  Lot No. LRB No. Used   CARTILAGE MTP SYNTH CARTIVA 1PC [id number] - [id number] Implant CARTILAGE MTP SYNTH CARTIVA 1PC [id number]   CARTIVA [id number] Right 1         Complications: none    Follow up:   Weight bearing in a surgical shoe.  1 week follow up.    Pk East DPM     Date: 11/6/2018  Time: 8:04 AM

## 2018-11-06 NOTE — DISCHARGE INSTRUCTIONS

## 2018-11-06 NOTE — ANESTHESIA PREPROCEDURE EVALUATION
Anesthesia Evaluation     Patient summary reviewed and Nursing notes reviewed   no history of anesthetic complications:  NPO Solid Status: > 8 hours  NPO Liquid Status: > 8 hours           Airway   Mallampati: I  TM distance: >3 FB  Neck ROM: full  No difficulty expected  Dental      Pulmonary    (-) asthma, sleep apnea, not a smoker  Cardiovascular   Exercise tolerance: good (4-7 METS)    ECG reviewed    (-) hypertension, past MI, CAD      Neuro/Psych  (+) dizziness/light headedness (menieres disease, takes hctz),     GI/Hepatic/Renal/Endo    (+)  GERD,    (-) liver disease, no renal disease, diabetes    Musculoskeletal     (+) back pain,       ROS comment: ED visit 2 days ago, found to have lower back disc problems. Given flexeril and percocet x 1. Presumed to have flared due to stopping nsaids in preparation for foot surgery.   Abdominal    Substance History      OB/GYN          Other   (+) arthritis         Phys Exam Other: permanent bridge upper teeth                Anesthesia Plan    ASA 2     general     intravenous induction   Anesthetic plan, all risks, benefits, and alternatives have been provided, discussed and informed consent has been obtained with: patient.

## 2018-11-07 LAB
CYTO UR: NORMAL
LAB AP CASE REPORT: NORMAL
LAB AP DIAGNOSIS COMMENT: NORMAL
PATH REPORT.FINAL DX SPEC: NORMAL
PATH REPORT.GROSS SPEC: NORMAL

## 2018-11-12 NOTE — ED NOTES
"ED Call Back Questions    1. How are you doing since leaving the Emergency Department?    Doing well, post op appt wed  2. Do you have any questions about your discharge instructions? No     3. Have you filled your new prescriptions yet? Yes   a. Do you have any questions about those medications? No     4. Were you able to make a follow-up appointment with the physician? Yes     5. Do you have a primary care physician? Yes   a. If No, would you like for me to set you up with one? N/A  i. If Yes, “I will have our ED  give you a call right back at this number to work with you on the best time for an appointment.”    6. We are always looking to get better at what we do. Do you have any suggestions for what we can do to be even better? No   a. If Yes, \"Thank you for sharing your concerns. I apologize. I will follow up with our manager and patient . Would you like someone to call you back?\" N/A    7. Is there anything else I can do for you? No     "

## 2018-11-26 ENCOUNTER — TRANSCRIBE ORDERS (OUTPATIENT)
Dept: ADMINISTRATIVE | Facility: HOSPITAL | Age: 68
End: 2018-11-26

## 2018-11-26 ENCOUNTER — HOSPITAL ENCOUNTER (OUTPATIENT)
Dept: GENERAL RADIOLOGY | Facility: HOSPITAL | Age: 68
Discharge: HOME | End: 2018-11-26
Attending: FAMILY MEDICINE

## 2018-11-26 ENCOUNTER — HOSPITAL ENCOUNTER (OUTPATIENT)
Dept: CARDIOLOGY | Facility: HOSPITAL | Age: 68
Discharge: HOME | End: 2018-11-26
Attending: FAMILY MEDICINE | Admitting: FAMILY MEDICINE

## 2018-11-26 ENCOUNTER — APPOINTMENT (OUTPATIENT)
Dept: CARDIOLOGY | Facility: HOSPITAL | Age: 68
End: 2018-11-26
Attending: FAMILY MEDICINE

## 2018-11-26 VITALS
HEIGHT: 69 IN | DIASTOLIC BLOOD PRESSURE: 60 MMHG | WEIGHT: 171.3 LBS | SYSTOLIC BLOOD PRESSURE: 122 MMHG | BODY MASS INDEX: 25.37 KG/M2

## 2018-11-26 DIAGNOSIS — R06.02 SOB (SHORTNESS OF BREATH): ICD-10-CM

## 2018-11-26 DIAGNOSIS — R06.02 SHORTNESS OF BREATH: Primary | ICD-10-CM

## 2018-11-26 DIAGNOSIS — I10 ESSENTIAL HYPERTENSION: ICD-10-CM

## 2018-11-26 DIAGNOSIS — E78.5 HYPERLIPIDEMIA, UNSPECIFIED HYPERLIPIDEMIA TYPE: ICD-10-CM

## 2018-11-26 DIAGNOSIS — I10 ESSENTIAL (PRIMARY) HYPERTENSION: ICD-10-CM

## 2018-11-26 DIAGNOSIS — R06.02 SOB (SHORTNESS OF BREATH): Primary | ICD-10-CM

## 2018-11-26 LAB
BH CV ECHO MEAS - AO MAX PG (FULL): 0.96 MMHG
BH CV ECHO MEAS - AO MAX PG: 6.3 MMHG
BH CV ECHO MEAS - AO MEAN PG (FULL): 0 MMHG
BH CV ECHO MEAS - AO MEAN PG: 3 MMHG
BH CV ECHO MEAS - AO ROOT AREA (BSA CORRECTED): 1.3
BH CV ECHO MEAS - AO ROOT AREA: 4.9 CM^2
BH CV ECHO MEAS - AO ROOT DIAM: 2.5 CM
BH CV ECHO MEAS - AO V2 MAX: 125 CM/SEC
BH CV ECHO MEAS - AO V2 MEAN: 88.6 CM/SEC
BH CV ECHO MEAS - AO V2 VTI: 29.2 CM
BH CV ECHO MEAS - AVA(I,A): 2.4 CM^2
BH CV ECHO MEAS - AVA(I,D): 2.4 CM^2
BH CV ECHO MEAS - AVA(V,A): 2.3 CM^2
BH CV ECHO MEAS - AVA(V,D): 2.3 CM^2
BH CV ECHO MEAS - BSA(HAYCOCK): 2 M^2
BH CV ECHO MEAS - BSA: 1.9 M^2
BH CV ECHO MEAS - BZI_BMI: 25.3 KILOGRAMS/M^2
BH CV ECHO MEAS - BZI_METRIC_HEIGHT: 175.3 CM
BH CV ECHO MEAS - BZI_METRIC_WEIGHT: 77.6 KG
BH CV ECHO MEAS - EDV(CUBED): 48.2 ML
BH CV ECHO MEAS - EDV(TEICH): 55.9 ML
BH CV ECHO MEAS - EF(CUBED): 86.7 %
BH CV ECHO MEAS - EF(TEICH): 81.1 %
BH CV ECHO MEAS - ESV(CUBED): 6.4 ML
BH CV ECHO MEAS - ESV(TEICH): 10.6 ML
BH CV ECHO MEAS - FS: 48.9 %
BH CV ECHO MEAS - IVS/LVPW: 1.5
BH CV ECHO MEAS - IVSD: 1.2 CM
BH CV ECHO MEAS - LA DIMENSION: 3.1 CM
BH CV ECHO MEAS - LA/AO: 1.2
BH CV ECHO MEAS - LAT PEAK E' VEL: 12.2 CM/SEC
BH CV ECHO MEAS - LV MASS(C)D: 103.5 GRAMS
BH CV ECHO MEAS - LV MASS(C)DI: 53.5 GRAMS/M^2
BH CV ECHO MEAS - LV MAX PG: 5.3 MMHG
BH CV ECHO MEAS - LV MEAN PG: 3 MMHG
BH CV ECHO MEAS - LV V1 MAX: 115 CM/SEC
BH CV ECHO MEAS - LV V1 MEAN: 82.7 CM/SEC
BH CV ECHO MEAS - LV V1 VTI: 27.8 CM
BH CV ECHO MEAS - LVIDD: 3.6 CM
BH CV ECHO MEAS - LVIDS: 1.9 CM
BH CV ECHO MEAS - LVOT AREA (M): 2.5 CM^2
BH CV ECHO MEAS - LVOT AREA: 2.5 CM^2
BH CV ECHO MEAS - LVOT DIAM: 1.8 CM
BH CV ECHO MEAS - LVPWD: 0.77 CM
BH CV ECHO MEAS - MED PEAK E' VEL: 8.49 CM/SEC
BH CV ECHO MEAS - MV A MAX VEL: 74.7 CM/SEC
BH CV ECHO MEAS - MV DEC TIME: 0.15 SEC
BH CV ECHO MEAS - MV E MAX VEL: 87.3 CM/SEC
BH CV ECHO MEAS - MV E/A: 1.2
BH CV ECHO MEAS - RAP SYSTOLE: 5 MMHG
BH CV ECHO MEAS - RVSP: 21.8 MMHG
BH CV ECHO MEAS - SI(AO): 74.2 ML/M^2
BH CV ECHO MEAS - SI(CUBED): 21.6 ML/M^2
BH CV ECHO MEAS - SI(LVOT): 36.6 ML/M^2
BH CV ECHO MEAS - SI(TEICH): 23.5 ML/M^2
BH CV ECHO MEAS - SV(AO): 143.3 ML
BH CV ECHO MEAS - SV(CUBED): 41.8 ML
BH CV ECHO MEAS - SV(LVOT): 70.7 ML
BH CV ECHO MEAS - SV(TEICH): 45.3 ML
BH CV ECHO MEAS - TR MAX VEL: 205 CM/SEC
BH CV ECHO MEASUREMENTS AVERAGE E/E' RATIO: 8.44
LEFT ATRIUM VOLUME INDEX: 19.6 ML/M2
LEFT ATRIUM VOLUME: 37.9 CM3
LV EF 2D ECHO EST: 65 %
MAXIMAL PREDICTED HEART RATE: 153 BPM
STRESS TARGET HR: 130 BPM

## 2018-11-26 PROCEDURE — 71046 X-RAY EXAM CHEST 2 VIEWS: CPT

## 2018-11-26 PROCEDURE — 93005 ELECTROCARDIOGRAM TRACING: CPT

## 2018-11-26 PROCEDURE — 93306 TTE W/DOPPLER COMPLETE: CPT | Performed by: INTERNAL MEDICINE

## 2018-11-26 PROCEDURE — 93010 ELECTROCARDIOGRAM REPORT: CPT | Performed by: INTERNAL MEDICINE

## 2018-11-26 PROCEDURE — 93306 TTE W/DOPPLER COMPLETE: CPT

## 2018-11-27 ENCOUNTER — TRANSCRIBE ORDERS (OUTPATIENT)
Dept: ADMINISTRATIVE | Facility: HOSPITAL | Age: 68
End: 2018-11-27

## 2018-11-27 ENCOUNTER — HOSPITAL ENCOUNTER (OUTPATIENT)
Dept: CT IMAGING | Facility: HOSPITAL | Age: 68
Discharge: HOME | End: 2018-11-27
Attending: FAMILY MEDICINE | Admitting: FAMILY MEDICINE

## 2018-11-27 DIAGNOSIS — R06.02 SHORTNESS OF BREATH: ICD-10-CM

## 2018-11-27 DIAGNOSIS — R79.89 OTHER SPECIFIED ABNORMAL FINDINGS OF BLOOD CHEMISTRY: ICD-10-CM

## 2018-11-27 DIAGNOSIS — R79.89 OTHER SPECIFIED ABNORMAL FINDINGS OF BLOOD CHEMISTRY: Primary | ICD-10-CM

## 2018-11-27 PROCEDURE — 71275 CT ANGIOGRAPHY CHEST: CPT

## 2018-11-27 PROCEDURE — 82565 ASSAY OF CREATININE: CPT

## 2018-11-27 PROCEDURE — 0 IOPAMIDOL PER 1 ML: Performed by: FAMILY MEDICINE

## 2018-11-27 RX ADMIN — IOPAMIDOL 100 ML: 755 INJECTION, SOLUTION INTRAVENOUS at 11:54

## 2018-11-28 LAB — CREAT BLDA-MCNC: 0.9 MG/DL (ref 0.6–1.3)

## 2018-12-03 ENCOUNTER — TRANSCRIBE ORDERS (OUTPATIENT)
Dept: ADMINISTRATIVE | Facility: HOSPITAL | Age: 68
End: 2018-12-03

## 2018-12-03 DIAGNOSIS — R06.02 SHORTNESS OF BREATH: Primary | ICD-10-CM

## 2018-12-07 ENCOUNTER — HOSPITAL ENCOUNTER (OUTPATIENT)
Dept: PULMONOLOGY | Facility: HOSPITAL | Age: 68
Discharge: HOME | End: 2018-12-07
Admitting: PHYSICIAN ASSISTANT

## 2018-12-07 DIAGNOSIS — R06.02 SHORTNESS OF BREATH: ICD-10-CM

## 2018-12-07 LAB
ARTERIAL PATENCY WRIST A: POSITIVE
ATMOSPHERIC PRESS: 764 MMHG
BASE EXCESS BLDA CALC-SCNC: 1.2 MMOL/L (ref 0–2)
BDY SITE: ABNORMAL
BODY TEMPERATURE: 37 C
HCO3 BLDA-SCNC: 22.5 MMOL/L (ref 20–26)
Lab: ABNORMAL
MODALITY: ABNORMAL
PCO2 BLDA: 26.6 MM HG (ref 35–45)
PH BLDA: 7.54 PH UNITS (ref 7.35–7.45)
PO2 BLDA: 113 MM HG (ref 83–108)
SAO2 % BLDCOA: 97.2 % (ref 94–99)
VENTILATOR MODE: ABNORMAL

## 2018-12-07 PROCEDURE — 82803 BLOOD GASES ANY COMBINATION: CPT

## 2018-12-07 PROCEDURE — 94727 GAS DIL/WSHOT DETER LNG VOL: CPT

## 2018-12-07 PROCEDURE — 94060 EVALUATION OF WHEEZING: CPT | Performed by: INTERNAL MEDICINE

## 2018-12-07 PROCEDURE — 94729 DIFFUSING CAPACITY: CPT | Performed by: INTERNAL MEDICINE

## 2018-12-07 PROCEDURE — 94727 GAS DIL/WSHOT DETER LNG VOL: CPT | Performed by: INTERNAL MEDICINE

## 2018-12-07 PROCEDURE — 94729 DIFFUSING CAPACITY: CPT

## 2018-12-07 PROCEDURE — 94060 EVALUATION OF WHEEZING: CPT

## 2018-12-07 PROCEDURE — 36600 WITHDRAWAL OF ARTERIAL BLOOD: CPT

## 2018-12-07 RX ORDER — ALBUTEROL SULFATE 2.5 MG/3ML
2.5 SOLUTION RESPIRATORY (INHALATION) ONCE
Status: COMPLETED | OUTPATIENT
Start: 2018-12-07 | End: 2018-12-07

## 2018-12-07 RX ADMIN — ALBUTEROL SULFATE 2.5 MG: 2.5 SOLUTION RESPIRATORY (INHALATION) at 10:26

## 2018-12-10 ENCOUNTER — DOCUMENTATION (OUTPATIENT)
Dept: PULMONOLOGY | Facility: CLINIC | Age: 68
End: 2018-12-10

## 2018-12-11 NOTE — PROGRESS NOTES
[facility] - Pulmonary Function Test    [address]  [place]  [place]  [address]  [phone]    Patient : Earline Salazar   MRN : [id number]  CSN : [id number]  Pulmonologist : Yaniv Youngblood MD  Date : 12/10/2018    ______________________________________________________________________    Interpretation :  This is an addendum to the patient's pulmonary function study report.  The patient had arterial blood gases performed which revealed a respiratory alkalosis on room air and otherwise are within normal limits.      Yaniv Youngblood MD

## 2019-05-02 DIAGNOSIS — J01.81 OTHER ACUTE RECURRENT SINUSITIS: ICD-10-CM

## 2019-05-02 DIAGNOSIS — J32.8 OTHER CHRONIC SINUSITIS: ICD-10-CM

## 2019-05-02 RX ORDER — AZELASTINE 1 MG/ML
SPRAY, METERED NASAL
Qty: 30 ML | Refills: 0 | Status: SHIPPED | OUTPATIENT
Start: 2019-05-02 | End: 2019-07-19 | Stop reason: SDUPTHER

## 2019-07-19 DIAGNOSIS — J01.81 OTHER ACUTE RECURRENT SINUSITIS: ICD-10-CM

## 2019-07-19 DIAGNOSIS — J32.8 OTHER CHRONIC SINUSITIS: ICD-10-CM

## 2019-07-19 RX ORDER — AZELASTINE HYDROCHLORIDE 137 UG/1
SPRAY, METERED NASAL
Qty: 30 ML | Refills: 0 | Status: SHIPPED | OUTPATIENT
Start: 2019-07-19 | End: 2020-12-02 | Stop reason: SDUPTHER

## 2019-10-31 ENCOUNTER — HOSPITAL ENCOUNTER (OUTPATIENT)
Dept: WOMENS IMAGING | Age: 69
Discharge: HOME | End: 2019-10-31
Payer: MEDICARE

## 2019-10-31 DIAGNOSIS — Z12.31 ENCOUNTER FOR SCREENING MAMMOGRAM FOR MALIGNANT NEOPLASM OF BREAST: ICD-10-CM

## 2019-10-31 PROCEDURE — 77063 BREAST TOMOSYNTHESIS BI: CPT

## 2020-09-02 ENCOUNTER — OFFICE VISIT (OUTPATIENT)
Dept: OTOLARYNGOLOGY | Facility: CLINIC | Age: 70
End: 2020-09-02

## 2020-09-02 VITALS
HEIGHT: 69 IN | TEMPERATURE: 97.5 F | SYSTOLIC BLOOD PRESSURE: 130 MMHG | DIASTOLIC BLOOD PRESSURE: 75 MMHG | HEART RATE: 83 BPM | BODY MASS INDEX: 24.11 KG/M2 | WEIGHT: 162.8 LBS

## 2020-09-02 DIAGNOSIS — H61.031: Primary | ICD-10-CM

## 2020-09-02 PROCEDURE — 99214 OFFICE O/P EST MOD 30 MIN: CPT | Performed by: PHYSICIAN ASSISTANT

## 2020-09-02 RX ORDER — BUPROPION HYDROCHLORIDE 300 MG/1
300 TABLET ORAL EVERY MORNING
COMMUNITY
Start: 2020-06-24

## 2020-09-02 RX ORDER — TRIAMCINOLONE ACETONIDE 5 MG/G
OINTMENT TOPICAL 2 TIMES DAILY
Qty: 15 G | Refills: 5 | Status: SHIPPED | OUTPATIENT
Start: 2020-09-02 | End: 2020-09-12

## 2020-09-02 NOTE — PROGRESS NOTES
CATY Toney     Chief Complaint   Patient presents with   • Ear Problem     pain and tenderness on outside of the right ear        HISTORY OF PRESENT ILLNESS:     Earline Salazar is a  70 y.o.  female who complains of otalgia. The symptoms are localized to the right ear. The patient has had variable symptoms. The symptoms have been recurrent in nature, occurring several times a month for the last several months. The symptoms are aggravated by  no identifiable factors. The symptoms are improved by Cortisporin.    Pain is currently resolved, but the patient reports having pain in the left ear yesterday that has resolved too.    Review of Systems   Constitutional: Negative for activity change, appetite change, chills, diaphoresis, fatigue, fever and unexpected weight change.   HENT: Positive for ear pain. Negative for congestion, dental problem, drooling, ear discharge, facial swelling, hearing loss, mouth sores, nosebleeds, postnasal drip, rhinorrhea, sinus pressure, sneezing, sore throat, tinnitus, trouble swallowing and voice change.    Eyes: Negative.    Respiratory: Negative.    Cardiovascular: Negative.    Gastrointestinal: Negative.    Endocrine: Negative.    Skin: Negative.    Allergic/Immunologic: Negative for environmental allergies, food allergies and immunocompromised state.   Neurological: Negative.    Hematological: Negative.    Psychiatric/Behavioral: Negative.    :    Past History:  Past Medical History:   Diagnosis Date   • Allergic rhinitis    • Arthralgia    • Arthritis    • GERD (gastroesophageal reflux disease)    • Hammer toe    • Hyperlipidemia    • Nail avulsion, toe    • Schatzki's ring    • Swelling    • UTI (urinary tract infection)     recently diagnosed at pre work   • Vaginal bleeding      Past Surgical History:   Procedure Laterality Date   • APPENDECTOMY     • COLONOSCOPY  03/10/2016   • ENDOSCOPY N/A 5/12/2017    Procedure: ESOPHAGOGASTRODUODENOSCOPY WITH ANESTHESIA;   Surgeon: Xochitl Reyes MD;  Location:  [facility];  Service:    • HAMMER TOE REPAIR Right 2018    Procedure: HAMMERTOE REPAIR WITH PINNING 2nd DIGIT, CARTIVA IMPLANT;  Surgeon: Pk East DPM;  Location:  [facility] OR;  Service: Podiatry   • HYSTERECTOMY     • KNEE ARTHROSCOPY W/ MENISCAL REPAIR Left    • PATENT FORAMEN OVALE CLOSURE     • TOE NAIL AVULSION Right 2018    Procedure: TOE NAIL AVULSION WITH NAIL BIOPSY- RIGHT FOOT;  Surgeon: Pk East DPM;  Location:  [facility] OR;  Service: Podiatry   • TONSILLECTOMY     • UPPER GASTROINTESTINAL ENDOSCOPY  03/10/2016     Family History   Problem Relation Age of Onset   • Colon polyps Mother    • Heart disease Mother    • Thyroid cancer Sister    • Leukemia Sister      Social History     Tobacco Use   • Smoking status: Former Smoker     Last attempt to quit: 2006     Years since quittin.3   • Smokeless tobacco: Never Used   Substance Use Topics   • Alcohol use: Yes     Comment: occ   • Drug use: No     Outpatient Medications Marked as Taking for the 20 encounter (Office Visit) with Kei Bustamante PA   Medication Sig Dispense Refill   • aspirin 81 MG chewable tablet Chew 81 mg Daily.     • Azelastine HCl 137 MCG/SPRAY solution USE 2 SPRAYS IN EACH NOSTRIL ONCE DAILY 30 mL 0   • buPROPion XL (WELLBUTRIN XL) 300 MG 24 hr tablet      • calcium citrate-vitamin d (CITRACAL) 200-250 MG-UNIT tablet tablet Take  by mouth Daily.     • cholecalciferol (VITAMIN D3) 1000 UNITS tablet Take 1,000 Units by mouth Daily.     • cyclobenzaprine (FLEXERIL) 10 MG tablet Take 1 tablet by mouth 3 (Three) Times a Day As Needed for Muscle Spasms for up to 12 doses. 12 tablet 0   • fluticasone (FLONASE) 50 MCG/ACT nasal spray 2 sprays into the nostril(s) as directed by provider Daily. 1 bottle 6   • levocetirizine (XYZAL) 5 MG tablet Take 5 mg by mouth Every Evening.     • Lifitegrast (XIIDRA OP) Apply  to eye.     • magnesium oxide (MAGOX) 400  (241.3 MG) MG tablet tablet Take 400 mg by mouth Daily.     • melatonin 1 MG tablet Take 1 mg by mouth Every Night.     • Multiple Vitamins-Minerals (CENTRUM SILVER 50+WOMEN PO) Take 1 tablet by mouth Daily.     • NON FORMULARY 2 (Two) Times a Day. Hemp oil, CBD oil 1/2 dropper twice a day     • pramipexole (MIRAPEX) 1 MG tablet Take 1 mg by mouth 3 (Three) Times a Day.     • Specialty Vitamins Products (VITAMINS FOR HAIR PO) Take  by mouth. Hair growth support       Allergies:  Patient has no known allergies.          Vital Signs:   Vitals:    09/02/20 0950   BP: 130/75   Pulse: 83   Temp: 97.5 °F (36.4 °C)         EXAMINATION:   CONSTITUTIONAL: well nourished, alert, oriented, in no acute distress     COMMUNICATION AND VOICE: able to communicate normally, normal voice quality    HEAD: normocephalic, no lesions, atraumatic, no tenderness, no masses     FACE: appearance normal, no lesions, no tenderness, no deformities, facial motion symmetric    SALIVARY GLANDS: parotid glands with no tenderness, no swelling, no masses, submandibular glands with normal size, nontender    EYES: ocular motility normal, eyelids normal, orbits normal, no proptosis, conjunctiva normal , pupils equal, round     EARS:  Hearing: response to conversational voice normal bilaterally   External Ears: auricles without lesions  Otoscopic: tympanic membrane appearance normal, no lesions, no perforation, normal mobility, no fluid    NOSE:  External Nose: structure normal, no tenderness on palpation, no nasal discharge, no lesions, no evidence of trauma, nostrils patent   Intranasal Exam: nasal mucosa normal, vestibule within normal limits, inferior turbinate normal, nasal septum midline     ORAL:  Lips: upper and lower lips without lesion   Teeth: dentition within normal limits for age   Gums: gingivae healthy   Oral Mucosa: oral mucosa normal, no mucosal lesions   Floor of Mouth: Warthin’s duct patent, mucosa normal  Tongue: lingual mucosa normal  without lesions, normal tongue mobility   Palate: soft and hard palates with normal mucosa and structure  Oropharynx: oropharyngeal mucosa normal    NECK: neck appearance normal, no mass,  noted without erythema or tenderness    THYROID: no overt thyromegaly, no tenderness, nodules or mass present on palpation, position midline     LYMPH NODES: no lymphadenopathy    CHEST/RESPIRATORY: respiratory effort normal, normal breath sounds     CARDIOVASCULAR: rate and rhythm normal, extremities without cyanosis or edema      NEUROLOGIC/PSYCHIATRIC: oriented to time, place and person, mood normal, affect appropriate, CN II-XII intact grossly    RESULTS REVIEW:    I have reviewed the patients old records in the chart.       Assessment    Diagnosis Plan   1. Chondritis of external ear, right  triamcinolone (KENALOG) 0.5 % ointment       Plan    Patient Instructions   Will start steroid ointment on the ear as directed, recheck in 3 months.    New Medications Ordered This Visit   Medications   • triamcinolone (KENALOG) 0.5 % ointment     Sig: Apply  topically to the appropriate area as directed 2 (Two) Times a Day for 10 days.     Dispense:  15 g     Refill:  5             Return in about 3 months (around 12/2/2020) for Recheck ears.    CATY Toney  09/02/20  10:15

## 2020-09-27 ENCOUNTER — OFFICE VISIT (OUTPATIENT)
Dept: URGENT CARE | Age: 70
End: 2020-09-27
Payer: MEDICARE

## 2020-09-27 VITALS
HEART RATE: 76 BPM | SYSTOLIC BLOOD PRESSURE: 109 MMHG | OXYGEN SATURATION: 99 % | BODY MASS INDEX: 23.82 KG/M2 | DIASTOLIC BLOOD PRESSURE: 61 MMHG | RESPIRATION RATE: 18 BRPM | WEIGHT: 160.8 LBS | HEIGHT: 69 IN | TEMPERATURE: 98.6 F

## 2020-09-27 LAB
APPEARANCE FLUID: ABNORMAL
BILIRUBIN, POC: NEGATIVE
BLOOD URINE, POC: ABNORMAL
CLARITY, POC: ABNORMAL
COLOR, POC: YELLOW
GLUCOSE URINE, POC: NEGATIVE
KETONES, POC: NEGATIVE
LEUKOCYTE EST, POC: ABNORMAL
NITRITE, POC: NEGATIVE
PH, POC: 6
PROTEIN, POC: NEGATIVE
SPECIFIC GRAVITY, POC: 1.02
UROBILINOGEN, POC: 0.2

## 2020-09-27 PROCEDURE — 99203 OFFICE O/P NEW LOW 30 MIN: CPT | Performed by: NURSE PRACTITIONER

## 2020-09-27 PROCEDURE — 81002 URINALYSIS NONAUTO W/O SCOPE: CPT | Performed by: NURSE PRACTITIONER

## 2020-09-27 RX ORDER — BUPROPION HYDROCHLORIDE 300 MG/1
TABLET ORAL
COMMUNITY
Start: 2020-06-24

## 2020-09-27 RX ORDER — CEPHALEXIN 500 MG/1
500 CAPSULE ORAL 2 TIMES DAILY
Qty: 14 CAPSULE | Refills: 0 | Status: SHIPPED | OUTPATIENT
Start: 2020-09-27 | End: 2020-10-04

## 2020-09-27 RX ORDER — SPIRONOLACTONE 25 MG/1
TABLET ORAL
COMMUNITY
Start: 2020-09-22

## 2020-09-27 RX ORDER — PRAMIPEXOLE DIHYDROCHLORIDE 1 MG/1
1 TABLET ORAL 3 TIMES DAILY
COMMUNITY

## 2020-09-27 ASSESSMENT — VISUAL ACUITY: OU: 1

## 2020-09-27 ASSESSMENT — ENCOUNTER SYMPTOMS
DIARRHEA: 0
BACK PAIN: 0
VOMITING: 0
RESPIRATORY NEGATIVE: 1
ABDOMINAL PAIN: 0
NAUSEA: 0

## 2020-09-27 NOTE — PATIENT INSTRUCTIONS
Plenty of fluids  Keflex as directed  Urine sent for culture and we will call with results  Follow up with PCP or return to Urgent Care for worsening or unresolved symptoms. Patient Education        Urinary Tract Infection in Women: Care Instructions  Your Care Instructions     A urinary tract infection, or UTI, is a general term for an infection anywhere between the kidneys and the urethra (where urine comes out). Most UTIs are bladder infections. They often cause pain or burning when you urinate. UTIs are caused by bacteria and can be cured with antibiotics. Be sure to complete your treatment so that the infection goes away. Follow-up care is a key part of your treatment and safety. Be sure to make and go to all appointments, and call your doctor if you are having problems. It's also a good idea to know your test results and keep a list of the medicines you take. How can you care for yourself at home? · Take your antibiotics as directed. Do not stop taking them just because you feel better. You need to take the full course of antibiotics. · Drink extra water and other fluids for the next day or two. This may help wash out the bacteria that are causing the infection. (If you have kidney, heart, or liver disease and have to limit fluids, talk with your doctor before you increase your fluid intake.)  · Avoid drinks that are carbonated or have caffeine. They can irritate the bladder. · Urinate often. Try to empty your bladder each time. · To relieve pain, take a hot bath or lay a heating pad set on low over your lower belly or genital area. Never go to sleep with a heating pad in place. To prevent UTIs  · Drink plenty of water each day. This helps you urinate often, which clears bacteria from your system. (If you have kidney, heart, or liver disease and have to limit fluids, talk with your doctor before you increase your fluid intake.)  · Urinate when you need to.   · Urinate right after you have sex.  · Change sanitary pads often. · Avoid douches, bubble baths, feminine hygiene sprays, and other feminine hygiene products that have deodorants. · After going to the bathroom, wipe from front to back. When should you call for help? Call your doctor now or seek immediate medical care if:  · Symptoms such as fever, chills, nausea, or vomiting get worse or appear for the first time. · You have new pain in your back just below your rib cage. This is called flank pain. · There is new blood or pus in your urine. · You have any problems with your antibiotic medicine. Watch closely for changes in your health, and be sure to contact your doctor if:  · You are not getting better after taking an antibiotic for 2 days. · Your symptoms go away but then come back. Where can you learn more? Go to https://[employer].EcoNova. org and sign in to your [employer] account. Enter L922 in the [employer] box to learn more about \"Urinary Tract Infection in Women: Care Instructions. \"     If you do not have an account, please click on the \"Sign Up Now\" link. Current as of: August 22, 2019               Content Version: 12.5  © 1126-5733 Healthwise, Incorporated. Care instructions adapted under license by [facility] ([facility]). If you have questions about a medical condition or this instruction, always ask your healthcare professional. [place] [address] any warranty or liability for your use of this information.

## 2020-09-27 NOTE — PROGRESS NOTES
1950    Hepatitis C screen  1950    DTaP/Tdap/Td vaccine (1 - Tdap) 03/13/1970    Lipid screen  03/13/1991    Shingles Vaccine (1 of 2) 03/13/2001    Colon cancer screen colonoscopy  03/13/2001    Pneumococcal 65+ years Vaccine (1 of 1 - PPSV23) 03/13/2016    Annual Wellness Visit (AWV)  06/23/2019    Flu vaccine (1) 09/01/2020    Breast cancer screen  10/31/2021    DEXA (modify frequency per FRAX score)  Completed    Hepatitis A vaccine  Aged Out    Hepatitis B vaccine  Aged Out    Hib vaccine  Aged Out    Meningococcal (ACWY) vaccine  Aged Out       Subjective:     Review of Systems   Constitutional: Negative for activity change, appetite change, chills and fever. HENT: Negative. Respiratory: Negative. Cardiovascular: Negative. Gastrointestinal: Negative for abdominal pain, diarrhea, nausea and vomiting. Genitourinary: Positive for frequency. Negative for flank pain, hesitancy and urgency. Bladder spasms   Musculoskeletal: Negative for back pain. Skin: Negative for rash.       :Objective      Physical Exam  Vitals signs and nursing note reviewed. Constitutional:       General: She is awake. She is not in acute distress. Appearance: Normal appearance. She is well-developed, well-groomed and normal weight. She is not ill-appearing. HENT:      Head: Normocephalic. Right Ear: Hearing normal.      Left Ear: Hearing normal.      Nose: Nose normal.      Mouth/Throat:      Lips: Pink. Eyes:      General: Vision grossly intact. Neck:      Trachea: Phonation normal.   Cardiovascular:      Rate and Rhythm: Normal rate and regular rhythm. Heart sounds: Normal heart sounds, S1 normal and S2 normal. No murmur. No friction rub. No gallop. Pulmonary:      Effort: Pulmonary effort is normal. No respiratory distress. Breath sounds: Normal breath sounds and air entry. No wheezing, rhonchi or rales. Abdominal:      General: Abdomen is flat.  Bowel sounds are normal.      Palpations: Abdomen is soft. Tenderness: There is abdominal tenderness in the suprapubic area. There is no right CVA tenderness, left CVA tenderness, guarding or rebound. Musculoskeletal:         General: No tenderness or deformity. Skin:     General: Skin is warm and dry. Capillary Refill: Capillary refill takes less than 2 seconds. Neurological:      General: No focal deficit present. Mental Status: She is alert, oriented to person, place, and time and easily aroused. Mental status is at baseline. Psychiatric:         Attention and Perception: Attention normal.         Mood and Affect: Mood normal.         Speech: Speech normal.         Behavior: Behavior normal. Behavior is cooperative. /61   Pulse 76   Temp 98.6 °F (37 °C) (Oral)   Resp 18   Ht 5' 9\" (1.753 m)   Wt 160 lb 12.8 oz (72.9 kg)   SpO2 99%   BMI 23.75 kg/m²     :Assessment       Diagnosis Orders   1. Urinary frequency  POCT Urinalysis no Micro    Culture, Urine   2. Acute cystitis with hematuria  Culture, Urine       :Plan      Orders Placed This Encounter   Procedures    Culture, Urine     Order Specific Question:   Specify (ex-cath, midstream, cysto, etc)?      Answer:   clean catch    POCT Urinalysis no Micro     Results for orders placed or performed in visit on 09/27/20   POCT Urinalysis no Micro   Result Value Ref Range    Color, UA Yellow     Clarity, UA Cloudy     Glucose, UA POC Negative     Bilirubin, UA Negative     Ketones, UA Negative     Spec Grav, UA 1.020     Blood, UA POC Small     pH, UA 6.0     Protein, UA POC Negative     Urobilinogen, UA 0.2     Leukocytes, UA Small     Nitrite, UA Negative     Appearance, Fluid       Results for orders placed or performed in visit on 09/27/20   POCT Urinalysis no Micro   Result Value Ref Range    Color, UA Yellow     Clarity, UA Cloudy     Glucose, UA POC Negative     Bilirubin, UA Negative     Ketones, UA Negative     Spec Grav, UA 1. 020     Blood, UA POC Small     pH, UA 6.0     Protein, UA POC Negative     Urobilinogen, UA 0.2     Leukocytes, UA Small     Nitrite, UA Negative     Appearance, Fluid         Results for orders placed or performed in visit on 09/27/20   POCT Urinalysis no Micro   Result Value Ref Range    Color, UA Yellow     Clarity, UA Cloudy     Glucose, UA POC Negative     Bilirubin, UA Negative     Ketones, UA Negative     Spec Grav, UA 1.020     Blood, UA POC Small     pH, UA 6.0     Protein, UA POC Negative     Urobilinogen, UA 0.2     Leukocytes, UA Small     Nitrite, UA Negative     Appearance, Fluid         Return if symptoms worsen or fail to improve. Orders Placed This Encounter   Medications    cephALEXin (KEFLEX) 500 MG capsule     Sig: Take 1 capsule by mouth 2 times daily for 7 days     Dispense:  14 capsule     Refill:  0        Patient Instructions   Plenty of fluids  Keflex as directed  Urine sent for culture and we will call with results  Follow up with PCP or return to Urgent Care for worsening or unresolved symptoms. Patient Education        Urinary Tract Infection in Women: Care Instructions  Your Care Instructions     A urinary tract infection, or UTI, is a general term for an infection anywhere between the kidneys and the urethra (where urine comes out). Most UTIs are bladder infections. They often cause pain or burning when you urinate. UTIs are caused by bacteria and can be cured with antibiotics. Be sure to complete your treatment so that the infection goes away. Follow-up care is a key part of your treatment and safety. Be sure to make and go to all appointments, and call your doctor if you are having problems. It's also a good idea to know your test results and keep a list of the medicines you take. How can you care for yourself at home? · Take your antibiotics as directed. Do not stop taking them just because you feel better. You need to take the full course of antibiotics.   · Drink extra water and other fluids for the next day or two. This may help wash out the bacteria that are causing the infection. (If you have kidney, heart, or liver disease and have to limit fluids, talk with your doctor before you increase your fluid intake.)  · Avoid drinks that are carbonated or have caffeine. They can irritate the bladder. · Urinate often. Try to empty your bladder each time. · To relieve pain, take a hot bath or lay a heating pad set on low over your lower belly or genital area. Never go to sleep with a heating pad in place. To prevent UTIs  · Drink plenty of water each day. This helps you urinate often, which clears bacteria from your system. (If you have kidney, heart, or liver disease and have to limit fluids, talk with your doctor before you increase your fluid intake.)  · Urinate when you need to. · Urinate right after you have sex. · Change sanitary pads often. · Avoid douches, bubble baths, feminine hygiene sprays, and other feminine hygiene products that have deodorants. · After going to the bathroom, wipe from front to back. When should you call for help? Call your doctor now or seek immediate medical care if:  · Symptoms such as fever, chills, nausea, or vomiting get worse or appear for the first time. · You have new pain in your back just below your rib cage. This is called flank pain. · There is new blood or pus in your urine. · You have any problems with your antibiotic medicine. Watch closely for changes in your health, and be sure to contact your doctor if:  · You are not getting better after taking an antibiotic for 2 days. · Your symptoms go away but then come back. Where can you learn more? Go to https://[employer]b.eÃ‡[email or web address] org and sign in to your [employer] account. Enter X132 in the [employer] box to learn more about \"Urinary Tract Infection in Women: Care Instructions. \"     If you do not have an account, please click on the \"Sign Up Now\" link.  Current as of: August 22, 2019               Content Version: 12.5  © 7633-5405 Healthwise, Incorporated. Care instructions adapted under license by [facility] ([facility]). If you have questions about a medical condition or this instruction, always ask your healthcare professional. [place] [address] any warranty or liability for your use of this information. Patient given educational materials- see patient instructions. Discussed use, benefit, and side effects of prescribedmedications. All patient questions answered. Pt voiced understanding.        Electronically signed by RM Calle CNP on 9/27/2020 at 2:18 PM

## 2020-09-29 LAB
ORGANISM: ABNORMAL
URINE CULTURE, ROUTINE: ABNORMAL
URINE CULTURE, ROUTINE: ABNORMAL

## 2020-11-18 ENCOUNTER — HOSPITAL ENCOUNTER (OUTPATIENT)
Dept: WOMENS IMAGING | Age: 70
Discharge: HOME | End: 2020-11-18
Payer: MEDICARE

## 2020-11-18 PROCEDURE — 77080 DXA BONE DENSITY AXIAL: CPT

## 2020-11-18 PROCEDURE — 77063 BREAST TOMOSYNTHESIS BI: CPT

## 2020-12-02 ENCOUNTER — OFFICE VISIT (OUTPATIENT)
Dept: OTOLARYNGOLOGY | Facility: CLINIC | Age: 70
End: 2020-12-02

## 2020-12-02 VITALS
HEART RATE: 81 BPM | TEMPERATURE: 97.3 F | DIASTOLIC BLOOD PRESSURE: 62 MMHG | HEIGHT: 69 IN | SYSTOLIC BLOOD PRESSURE: 118 MMHG | BODY MASS INDEX: 23.99 KG/M2 | WEIGHT: 162 LBS

## 2020-12-02 DIAGNOSIS — J30.9 ALLERGIC RHINITIS, UNSPECIFIED SEASONALITY, UNSPECIFIED TRIGGER: ICD-10-CM

## 2020-12-02 DIAGNOSIS — K21.9 GASTROESOPHAGEAL REFLUX DISEASE WITHOUT ESOPHAGITIS: Primary | ICD-10-CM

## 2020-12-02 DIAGNOSIS — J32.8 OTHER CHRONIC SINUSITIS: ICD-10-CM

## 2020-12-02 DIAGNOSIS — H69.83 DYSFUNCTION OF BOTH EUSTACHIAN TUBES: ICD-10-CM

## 2020-12-02 DIAGNOSIS — H61.003: ICD-10-CM

## 2020-12-02 PROBLEM — H69.93 DYSFUNCTION OF BOTH EUSTACHIAN TUBES: Status: ACTIVE | Noted: 2020-12-02

## 2020-12-02 PROCEDURE — 99214 OFFICE O/P EST MOD 30 MIN: CPT | Performed by: PHYSICIAN ASSISTANT

## 2020-12-02 RX ORDER — TRIAMCINOLONE ACETONIDE 0.25 MG/G
CREAM TOPICAL 2 TIMES DAILY
COMMUNITY
End: 2021-10-21 | Stop reason: SDUPTHER

## 2020-12-02 RX ORDER — TRAZODONE HYDROCHLORIDE 50 MG/1
50 TABLET ORAL AS NEEDED
COMMUNITY
Start: 2020-11-02

## 2020-12-02 RX ORDER — AZELASTINE HYDROCHLORIDE 137 UG/1
2 SPRAY, METERED NASAL DAILY
Qty: 30 ML | Refills: 11 | Status: SHIPPED | OUTPATIENT
Start: 2020-12-02 | End: 2022-01-24 | Stop reason: SDUPTHER

## 2020-12-02 NOTE — PATIENT INSTRUCTIONS
Continue steroid ointment on the ears as needed for pain, continue flonase daily and restart astelin daily for ears and sinus/allergy problems, advised to start an antibiotic ointment in the nose. If symptoms worsen call for sooner appointment, otherwise follow-up in 6 months.    Nosebleed Fact Sheet    Nosebleeds are a common problem that we see in our clinic and can occur in any age group.  They can range from spotty bleeding to episodes of uncontrolled profuse bleeding.  Multiple medical conditions can contribute to nosebleeds and fortunately most of these can be controlled to limit and/or eliminate these bleeding episodes.    Most commonly bleeding occurs in the anterior or front part of the nose on the septum, or center wall of the nose.  This is because this area has several blood vessels vulnerable to both the drying effect of breathing, and to finger trauma of nose picking.  When this area become dry, the lining of the nose in this area can crack and cause the blood vessels underneath to bleed.    The following condition can contribute to and cause nosebleeds:  1. High Blood Pressure - When the blood pressure is high, the increased pressure can cause blood to be more easily pushed through a damaged blood vessel.  If your blood pressure is uncontrolled over 140 systolic, you may need to consult your primary-care physician to help limit your nosebleeds.  2. Low Platelets and Blood Clotting Factors - Certain medical conditions such as cancer and bleeding disorders can interfere with the body’s ability to form blood clots.  This interferes with the body’s own ability to stop nosebleeds.  3. Medications - Aspirin and aspirin type products such as nonsteroidal anti-inflammatory medications can interfere with body’s ability to form blood clots.  Nonsteroidal anti-inflammatory medications include medicines like ibuprofen (Motrin), naprosyn (Aleve), and Goody’s and BC powder.  Several cold and flu remedies also  include aspirin.  If there’s a question, please ask you doctor or pharmacist.  Recently, it has been shown that some herbal medications, such as high doses of Vitamin E, can also interfere with the body’s ability to form clots.  Often times, these types of medications need to be discontinued to help with nosebleed prevention.  4. Dryness - The nose needs to stay nice and moist to try to prevent any kind of cracking or injury to the nasal lining and underlying blood vessels.  The worst time of year for nosebleeds is in the wintertime when the humidity is low.  Occasionally, a nasal deviation can cause abnormal airflow that dries out an area on the septum and cause a nosebleed.  5. Trauma -One of the most common reasons for nosebleeds is trauma caused by nose picking or external injury.  If an area in your nose is irritated, scratching or picking it can cause it to easily bleed.  Recommendations for Preventing Nosebleeds:  1. Keep well hydrated by drinking at least six to eight glasses of water a day.  2. Increase the moisture of the nose by placing Vaseline in the front of the nose twice a day and irrigating the nose with nasal saline spray often (every 1-2 hrs).  3. Hold on taking aspirin or aspirin type products.  4. If you have high blood pressure, make sure this is well controlled.  5. Avoid nose picking and other forms of nasal trauma.  What to Do if Your Nose Bleeds:  1. Spray Afrin or a similar 12 hr nasal decongestant into the side that is bleeding.  2. With your thumb and forefinger, grasp the fleshy part of the nose and hold firm pressure.  If the bleeding is on the front part of the nose, it should make it stop.  Hold this pressure for 5 minutes on the clock then release.  If the nose is still bleeding, repeat.  If the nose is still bleeding after trying this 2-3 times, you may need to go to the emergency room for evaluation.  3. Call your doctor or go to the emergency room if you cannot get the bleeding  to stop or if you feel dizzy or lightheaded after a large bleed.

## 2020-12-02 NOTE — PROGRESS NOTES
CATY Toney     Chief Complaint   Patient presents with   • Follow-up        HISTORY OF PRESENT ILLNESS:     Earline Salazar is a  70 y.o.  female who is here for follow up. She has had complaints of otalgia and ear pressure, nasal drainage, postnasal drip and allergy. The symptoms are localized to the bilateral ears and nose. The symptoms severity was described as: variable. The symptoms have been: intermittant for the last several years. The symptoms are aggravated by  allergy and weather change. The symptoms are improved by antihistamines, nasal sprays and steroid ointment.    Review of Systems   Constitutional: Negative for activity change, appetite change, chills, diaphoresis, fatigue, fever and unexpected weight change.   HENT: Positive for ear pain and postnasal drip. Negative for congestion, dental problem, drooling, ear discharge, facial swelling, hearing loss, mouth sores, nosebleeds, rhinorrhea, sinus pressure, sneezing, sore throat, tinnitus, trouble swallowing and voice change.    Eyes: Negative.    Respiratory: Negative.    Cardiovascular: Negative.    Gastrointestinal:        Acid reflux   Endocrine: Negative.    Skin: Negative.    Allergic/Immunologic: Positive for environmental allergies. Negative for food allergies and immunocompromised state.   Neurological: Negative.    Hematological: Negative.    Psychiatric/Behavioral: Negative.    :    Past History:  Past Medical History:   Diagnosis Date   • Allergic rhinitis    • Arthralgia    • Arthritis    • GERD (gastroesophageal reflux disease)    • Hammer toe    • Hyperlipidemia    • Nail avulsion, toe    • Schatzki's ring    • Swelling    • UTI (urinary tract infection)     recently diagnosed at pre work   • Vaginal bleeding      Past Surgical History:   Procedure Laterality Date   • APPENDECTOMY     • COLONOSCOPY  03/10/2016   • ENDOSCOPY N/A 5/12/2017    Procedure: ESOPHAGOGASTRODUODENOSCOPY WITH ANESTHESIA;  Surgeon: Xochitl Reyes,  MD;  Location:  [facility];  Service:    • HAMMER TOE REPAIR Right 2018    Procedure: HAMMERTOE REPAIR WITH PINNING 2nd DIGIT, CARTIVA IMPLANT;  Surgeon: Pk East DPM;  Location:  [facility];  Service: Podiatry   • HYSTERECTOMY     • KNEE ARTHROSCOPY W/ MENISCAL REPAIR Left    • PATENT FORAMEN OVALE CLOSURE     • TOE NAIL AVULSION Right 2018    Procedure: TOE NAIL AVULSION WITH NAIL BIOPSY- RIGHT FOOT;  Surgeon: Pk East DPM;  Location:  [facility];  Service: Podiatry   • TONSILLECTOMY     • UPPER GASTROINTESTINAL ENDOSCOPY  03/10/2016     Family History   Problem Relation Age of Onset   • Colon polyps Mother    • Heart disease Mother    • Thyroid cancer Sister    • Leukemia Sister      Social History     Tobacco Use   • Smoking status: Former Smoker     Quit date: 2006     Years since quittin.5   • Smokeless tobacco: Never Used   Substance Use Topics   • Alcohol use: Yes     Comment: occ   • Drug use: No     Outpatient Medications Marked as Taking for the 20 encounter (Office Visit) with Kei Bustamante PA   Medication Sig Dispense Refill   • aspirin 81 MG chewable tablet Chew 81 mg Daily.     • Azelastine HCl 137 MCG/SPRAY solution 2 sprays by Each Nare route Daily. 30 mL 11   • buPROPion XL (WELLBUTRIN XL) 300 MG 24 hr tablet      • calcium citrate-vitamin d (CITRACAL) 200-250 MG-UNIT tablet tablet Take  by mouth Daily.     • cholecalciferol (VITAMIN D3) 1000 UNITS tablet Take 1,000 Units by mouth Daily.     • cyclobenzaprine (FLEXERIL) 10 MG tablet Take 1 tablet by mouth 3 (Three) Times a Day As Needed for Muscle Spasms for up to 12 doses. 12 tablet 0   • fluticasone (FLONASE) 50 MCG/ACT nasal spray 2 sprays into the nostril(s) as directed by provider Daily. 1 bottle 6   • levocetirizine (XYZAL) 5 MG tablet Take 5 mg by mouth Every Evening.     • Lifitegrast (XIIDRA OP) Apply  to eye.     • Multiple Vitamins-Minerals (CENTRUM SILVER 50+WOMEN PO) Take 1  tablet by mouth Daily.     • NON FORMULARY 2 (Two) Times a Day. Hemp oil, CBD oil 1/2 dropper twice a day     • pramipexole (MIRAPEX) 1 MG tablet Take 1 mg by mouth 3 (Three) Times a Day.     • Specialty Vitamins Products (VITAMINS FOR HAIR PO) Take  by mouth. Hair growth support     • traZODone (DESYREL) 50 MG tablet      • triamcinolone (KENALOG) 0.025 % cream Apply  topically to the appropriate area as directed 2 (Two) Times a Day.     • [DISCONTINUED] Azelastine HCl 137 MCG/SPRAY solution USE 2 SPRAYS IN EACH NOSTRIL ONCE DAILY 30 mL 0     Allergies:  Patient has no known allergies.          Vital Signs:   Vitals:    12/02/20 0911   BP: 118/62   Pulse: 81   Temp: 97.3 °F (36.3 °C)         EXAMINATION:   CONSTITUTIONAL: well nourished, alert, oriented, in no acute distress     COMMUNICATION AND VOICE: able to communicate normally, normal voice quality    HEAD: normocephalic, no lesions, atraumatic, no tenderness, no masses     FACE: appearance normal, no lesions, no tenderness, no deformities, facial motion symmetric    SALIVARY GLANDS: parotid glands with no tenderness, no swelling, no masses, submandibular glands with normal size, nontender    EYES: ocular motility normal, eyelids normal, orbits normal, no proptosis, conjunctiva normal , pupils equal, round     EARS:  Hearing: response to conversational voice normal bilaterally   External Ears: auricles without lesions  Otoscopic: right tympanic membrane appearance normal, no lesions, no perforation, decreased mobility with mild retraction and no effusion; left tympanic membrane appearance normal, no lesions, no perforation, decreased mobility with moderate retraction and mild serous effusion    NOSE:  External Nose: structure normal, no tenderness on palpation, no nasal discharge, no lesions, no evidence of trauma, nostrils patent   Intranasal Exam: nasal mucosa erythema and edema, vestibule within normal limits, inferior turbinate normal, nasal septum midline  with a scabbed area on the left Little's area    ORAL:  Lips: upper and lower lips without lesion   Teeth: dentition within normal limits for age   Gums: gingivae healthy   Oral Mucosa: oral mucosa normal, no mucosal lesions   Floor of Mouth: Warthin’s duct patent, mucosa normal  Tongue: lingual mucosa normal without lesions, normal tongue mobility   Palate: soft and hard palates with normal mucosa and structure  Oropharynx: oropharyngeal mucosa erythema with postnasal drainage    NECK: neck appearance normal, no mass,  noted without erythema or tenderness    THYROID: no overt thyromegaly, no tenderness, nodules or mass present on palpation, position midline     LYMPH NODES: no lymphadenopathy    CHEST/RESPIRATORY: respiratory effort normal, normal breath sounds     CARDIOVASCULAR: rate and rhythm normal, extremities without cyanosis or edema      NEUROLOGIC/PSYCHIATRIC: oriented to time, place and person, mood normal, affect appropriate, CN II-XII intact grossly    RESULTS REVIEW:    I have reviewed the patients old records in the chart.       Assessment    Diagnosis Plan   1. Gastroesophageal reflux disease without esophagitis     2. Other chronic sinusitis  Azelastine HCl 137 MCG/SPRAY solution   3. Allergic rhinitis, unspecified seasonality, unspecified trigger  Azelastine HCl 137 MCG/SPRAY solution   4. Dysfunction of both eustachian tubes  Azelastine HCl 137 MCG/SPRAY solution   5. Chondrodermatitis nodularis chronica helicis, bilateral  triamcinolone (KENALOG) 0.025 % cream       Plan    Patient Instructions   Continue steroid ointment on the ears as needed for pain, continue flonase daily and restart astelin daily for ears and sinus/allergy problems, advised to start an antibiotic ointment in the nose. If symptoms worsen call for sooner appointment, otherwise follow-up in 6 months.    Nosebleed Fact Sheet    Nosebleeds are a common problem that we see in our clinic and can occur in any age group.  They can  range from spotty bleeding to episodes of uncontrolled profuse bleeding.  Multiple medical conditions can contribute to nosebleeds and fortunately most of these can be controlled to limit and/or eliminate these bleeding episodes.    Most commonly bleeding occurs in the anterior or front part of the nose on the septum, or center wall of the nose.  This is because this area has several blood vessels vulnerable to both the drying effect of breathing, and to finger trauma of nose picking.  When this area become dry, the lining of the nose in this area can crack and cause the blood vessels underneath to bleed.    The following condition can contribute to and cause nosebleeds:  1. High Blood Pressure - When the blood pressure is high, the increased pressure can cause blood to be more easily pushed through a damaged blood vessel.  If your blood pressure is uncontrolled over 140 systolic, you may need to consult your primary-care physician to help limit your nosebleeds.  2. Low Platelets and Blood Clotting Factors - Certain medical conditions such as cancer and bleeding disorders can interfere with the body’s ability to form blood clots.  This interferes with the body’s own ability to stop nosebleeds.  3. Medications - Aspirin and aspirin type products such as nonsteroidal anti-inflammatory medications can interfere with body’s ability to form blood clots.  Nonsteroidal anti-inflammatory medications include medicines like ibuprofen (Motrin), naprosyn (Aleve), and Goody’s and BC powder.  Several cold and flu remedies also include aspirin.  If there’s a question, please ask you doctor or pharmacist.  Recently, it has been shown that some herbal medications, such as high doses of Vitamin E, can also interfere with the body’s ability to form clots.  Often times, these types of medications need to be discontinued to help with nosebleed prevention.  4. Dryness - The nose needs to stay nice and moist to try to prevent any kind of  cracking or injury to the nasal lining and underlying blood vessels.  The worst time of year for nosebleeds is in the wintertime when the humidity is low.  Occasionally, a nasal deviation can cause abnormal airflow that dries out an area on the septum and cause a nosebleed.  5. Trauma -One of the most common reasons for nosebleeds is trauma caused by nose picking or external injury.  If an area in your nose is irritated, scratching or picking it can cause it to easily bleed.  Recommendations for Preventing Nosebleeds:  1. Keep well hydrated by drinking at least six to eight glasses of water a day.  2. Increase the moisture of the nose by placing Vaseline in the front of the nose twice a day and irrigating the nose with nasal saline spray often (every 1-2 hrs).  3. Hold on taking aspirin or aspirin type products.  4. If you have high blood pressure, make sure this is well controlled.  5. Avoid nose picking and other forms of nasal trauma.  What to Do if Your Nose Bleeds:  1. Spray Afrin or a similar 12 hr nasal decongestant into the side that is bleeding.  2. With your thumb and forefinger, grasp the fleshy part of the nose and hold firm pressure.  If the bleeding is on the front part of the nose, it should make it stop.  Hold this pressure for 5 minutes on the clock then release.  If the nose is still bleeding, repeat.  If the nose is still bleeding after trying this 2-3 times, you may need to go to the emergency room for evaluation.  3. Call your doctor or go to the emergency room if you cannot get the bleeding to stop or if you feel dizzy or lightheaded after a large bleed.          New Medications Ordered This Visit   Medications   • Azelastine HCl 137 MCG/SPRAY solution     Si sprays by Each Nare route Daily.     Dispense:  30 mL     Refill:  11             Return in about 6 months (around 2021) for Recheck ears and nose.    CATY Toney  20  10:33 CST

## 2020-12-15 ENCOUNTER — TELEPHONE (OUTPATIENT)
Dept: OTOLARYNGOLOGY | Facility: CLINIC | Age: 70
End: 2020-12-15

## 2020-12-15 NOTE — TELEPHONE ENCOUNTER
PT is calling with c/o pain to R external ear that radiates down into her neck. She states it is sore to the touch on her skin/external. No inflammation, redness, or heat. PT states when she tries to place the kenalog cream on her ear she can hardly touch the area because it is so sore. No sinus pain/pressue or swollen lymph nodes that she has noticed. PT has not tried motrin, heat, or ice.

## 2021-01-12 ENCOUNTER — HOSPITAL ENCOUNTER (OUTPATIENT)
Dept: GENERAL RADIOLOGY | Age: 71
Discharge: HOME | End: 2021-01-12
Payer: MEDICARE

## 2021-01-12 DIAGNOSIS — M25.552 LEFT HIP PAIN: ICD-10-CM

## 2021-01-12 PROCEDURE — 73502 X-RAY EXAM HIP UNI 2-3 VIEWS: CPT

## 2021-01-22 ENCOUNTER — OFFICE VISIT (OUTPATIENT)
Dept: GASTROENTEROLOGY | Facility: CLINIC | Age: 71
End: 2021-01-22

## 2021-01-22 VITALS
SYSTOLIC BLOOD PRESSURE: 122 MMHG | OXYGEN SATURATION: 98 % | HEIGHT: 70 IN | WEIGHT: 166 LBS | BODY MASS INDEX: 23.77 KG/M2 | HEART RATE: 74 BPM | TEMPERATURE: 96.9 F | DIASTOLIC BLOOD PRESSURE: 64 MMHG

## 2021-01-22 DIAGNOSIS — Z80.0 FAMILY HISTORY OF COLON CANCER: ICD-10-CM

## 2021-01-22 DIAGNOSIS — Z86.010 HISTORY OF ADENOMATOUS POLYP OF COLON: Primary | ICD-10-CM

## 2021-01-22 DIAGNOSIS — R13.19 OTHER DYSPHAGIA: ICD-10-CM

## 2021-01-22 PROBLEM — Z86.0101 HISTORY OF ADENOMATOUS POLYP OF COLON: Status: ACTIVE | Noted: 2021-01-22

## 2021-01-22 PROCEDURE — 99213 OFFICE O/P EST LOW 20 MIN: CPT | Performed by: NURSE PRACTITIONER

## 2021-01-22 RX ORDER — RISEDRONATE SODIUM 35 MG/1
1 TABLET, FILM COATED ORAL DAILY
COMMUNITY
Start: 2021-01-19 | End: 2021-11-05

## 2021-01-22 RX ORDER — FAMOTIDINE 20 MG/1
20 TABLET, FILM COATED ORAL DAILY
COMMUNITY
End: 2022-07-14 | Stop reason: HOSPADM

## 2021-01-22 RX ORDER — SODIUM, POTASSIUM,MAG SULFATES 17.5-3.13G
1 SOLUTION, RECONSTITUTED, ORAL ORAL EVERY 12 HOURS
Qty: 2 BOTTLE | Refills: 0 | COMMUNITY
Start: 2021-01-22 | End: 2021-04-29 | Stop reason: HOSPADM

## 2021-01-22 NOTE — PROGRESS NOTES
"Chief Complaint   Patient presents with   • Colon Cancer Screening     Pt presents today for colon recall-last colon was 3/10/2016; Personal history of adenomatous and hyperplastic polyps; Family history of rectal cancer and colon polyps   • Difficulty Swallowing     Pt c/o trouble swallowing \"forever\"-feels like it's getting worse; Pt states it feels like food \"won't go down\"-states after food gets stuck her throat will hurt and then it hurts to swallow liquids     Subjective   HPI  Earline Salazar is a 70 y.o. female who presents as a referral for preventative maintenance. She has no complaints of nausea or vomiting. No change in bowels. No wt loss. No BRBPR. No melena. There is a family hx for rectal cancer maternal grandfather. No abdominal pain. There was no Cologuard screening this year. The patients last colonoscopy revealed adenomatous polyps 3/10/16.    The patient states that she has also has complaint of \"food getting stuck.\" The patients last EGD 2017 with Schatzki ring dilated. The patient denies any nausea, vomiting, epigastric pain,  pyrosis or hematemesis.  The patient denies any fever or chills.  Denies any melena or hematochezia.  Denies any unintentional weight loss or loss of appetite.      Past Medical History:   Diagnosis Date   • Allergic rhinitis    • Arthralgia    • Arthritis    • Family history of colonic polyps    • GERD (gastroesophageal reflux disease)    • Hammer toe    • History of adenomatous polyp of colon    • History of colon polyps    • Hyperlipidemia    • Nail avulsion, toe    • Schatzki's ring    • Swelling    • UTI (urinary tract infection)    • Vaginal bleeding      Past Surgical History:   Procedure Laterality Date   • APPENDECTOMY     • COLONOSCOPY  03/10/2016    Two 4-6mm tubular adenomatous polyps in the descending colon and in the transverse colon; The examination was otherwise normal on direct and retroflexion views; Repeat 5 years   • COLONOSCOPY  03/15/2012    One " 2-3mm mixed tubulo-hyperplastic polyp in the cecum; One 6mm mixed tubulo-hyperplastic polyp in the hepatic flexure; One 6mm tubular adenomatous polyp in the transverse colon; One 6mm mixed tubulo-hyperplastic polyp in the descending colon; One 6mm hyperplastic polyp in the sigmoid colon; Fiver less than 6mm hyperplastic polyps in the rectum; Repeat 4 years   • COLONOSCOPY  02/05/2007    Diverticulosis; One 6-7mm hyperplastic polyp in the ascending colon; Repeat 5 years   • ENDOSCOPY N/A 5/12/2017    Non-obstructing Schatzki ring-dilated; Normal stomach; Normal examined duodenum; No specimens collected   • ENDOSCOPY  03/10/2016    Small HH; Low grade of narrowing Schatzki ring-dilated; Normal stomach; Normal examined duodenum; No specimens collected   • ENDOSCOPY  03/15/2012    HH; Schatzki ring-dilated to 19mm   • ENDOSCOPY  08/05/2005    Normal endoscopy; GERD by history    • HAMMER TOE REPAIR Right 11/6/2018    Procedure: HAMMERTOE REPAIR WITH PINNING 2nd DIGIT, CARTIVA IMPLANT;  Surgeon: Pk East DPM;  Location:  [facility];  Service: Podiatry   • HYSTERECTOMY     • KNEE ARTHROSCOPY W/ MENISCAL REPAIR Left    • PATENT FORAMEN OVALE CLOSURE  2014   • TOE NAIL AVULSION Right 11/6/2018    Procedure: TOE NAIL AVULSION WITH NAIL BIOPSY- RIGHT FOOT;  Surgeon: Pk East DPM;  Location:  [facility];  Service: Podiatry   • TONSILLECTOMY         Current Outpatient Medications:   •  aspirin 81 MG chewable tablet, Chew 81 mg Daily., Disp: , Rfl:   •  Azelastine HCl 137 MCG/SPRAY solution, 2 sprays by Each Nare route Daily., Disp: 30 mL, Rfl: 11  •  buPROPion XL (WELLBUTRIN XL) 300 MG 24 hr tablet, Take 300 mg by mouth Every Morning., Disp: , Rfl:   •  cholecalciferol (VITAMIN D3) 1000 UNITS tablet, Take 1,000 Units by mouth Daily., Disp: , Rfl:   •  cyclobenzaprine (FLEXERIL) 10 MG tablet, Take 1 tablet by mouth 3 (Three) Times a Day As Needed for Muscle Spasms for up to 12 doses., Disp: 12 tablet, Rfl: 0  •   famotidine (PEPCID) 20 MG tablet, Take 20 mg by mouth Daily., Disp: , Rfl:   •  fluticasone (FLONASE) 50 MCG/ACT nasal spray, 2 sprays into the nostril(s) as directed by provider Daily., Disp: 1 bottle, Rfl: 6  •  levocetirizine (XYZAL) 5 MG tablet, Take 5 mg by mouth Every Evening., Disp: , Rfl:   •  Lifitegrast (XIIDRA OP), Administer 1 drop to both eyes 2 (two) times a day., Disp: , Rfl:   •  Multiple Vitamins-Minerals (CENTRUM SILVER 50+WOMEN PO), Take 1 tablet by mouth Daily., Disp: , Rfl:   •  NON FORMULARY, 2 (Two) Times a Day. Hemp oil, CBD oil 1/2 dropper twice a day, Disp: , Rfl:   •  pramipexole (MIRAPEX) 1 MG tablet, Take 1 mg by mouth 2 (two) times a day. Takes 1/2 tablet at a.m dose, Disp: , Rfl:   •  risedronate (ACTONEL) 35 MG tablet, Take 1 tablet by mouth Daily., Disp: , Rfl:   •  Specialty Vitamins Products (VITAMINS FOR HAIR PO), Take 1 capsule by mouth Daily. Hair growth support , Disp: , Rfl:   •  traZODone (DESYREL) 50 MG tablet, Take 50 mg by mouth As Needed., Disp: , Rfl:   •  triamcinolone (KENALOG) 0.025 % cream, Apply  topically to the appropriate area as directed 2 (Two) Times a Day., Disp: , Rfl:   •  sodium-potassium-magnesium sulfates (Suprep Bowel Prep Kit) 17.5-3.13-1.6 GM/177ML solution oral solution, Take 1 bottle by mouth Every 12 (Twelve) Hours. Split dose prep as directed by office instructions provided.  2 bottles = one kit., Disp: 2 bottle, Rfl: 0  No Known Allergies  Social History     Socioeconomic History   • Marital status:      Spouse name: Not on file   • Number of children: Not on file   • Years of education: Not on file   • Highest education level: Not on file   Tobacco Use   • Smoking status: Former Smoker     Quit date: 2006     Years since quittin.7   • Smokeless tobacco: Never Used   Substance and Sexual Activity   • Alcohol use: Yes     Comment: Occasionally    • Drug use: No   • Sexual activity: Defer     Family History   Problem Relation Age of  "Onset   • Colon polyps Mother         Unknown age    • Heart disease Mother    • Thyroid cancer Sister    • Leukemia Sister    • Rectal cancer Maternal Grandfather         In late 70's/early 80's    • Colon cancer Neg Hx    • Esophageal cancer Neg Hx    • Liver cancer Neg Hx    • Liver disease Neg Hx    • Stomach cancer Neg Hx        REVIEW OF SYSTEMS  General: well appearing, no fever chills or sweats, no unexplained wt loss  HEENT: no acute visual or hearing disturbances  Cardiovascular: No chest pain or palpitations  Pulmonary: No shortness of breath, coughing, wheezing or hemoptysis  : No burning, urgency, hematuria, or dysuria  Musculoskeletal: No joint pain or stiffness  Peripheral: no edema  Skin: No lesions or rashes  Neuro: No dizziness, headaches, stroke, syncope  Endocrine: No hot or cold intolerances  Hematological: No blood dyscrasias    Objective   Vitals:    01/22/21 0843   BP: 122/64   BP Location: Left arm   Patient Position: Sitting   Cuff Size: Adult   Pulse: 74   Temp: 96.9 °F (36.1 °C)   TempSrc: Infrared   SpO2: 98%   Weight: 75.3 kg (166 lb)   Height: 176.5 cm (69.5\")     Body mass index is 24.16 kg/m².    PHYSICAL EXAM  General: age appropriate well nourished well appearing, no acute distress  Head: normocephalic and atraumatic  Global assessment-supple  Neck-No JVD noted, no lymphadenopathy  Pulmonary-clear to auscultation bilaterally, normal respiratory effort  Cardiovascular-normal rate and rhythm, normal heart sounds, S1 and S2 noted  Abdomen-soft, non tender, non distended, normal bowel sounds all 4 quadrants, no hepatosplenomegaly noted  Extremities-No clubbing cyanosis or edema  Neuro-Non focal, converses appropriately, awake, alert, oriented      Imaging Results (Most Recent)     None        Assessment/Plan   Diagnoses and all orders for this visit:    1. History of adenomatous polyp of colon (Primary)  -     Case Request; Standing  -     Case Request    2. Family history of colon " cancer  Comments:  maternal grandfather rectal cancer   Orders:  -     Case Request; Standing  -     Case Request    3. Other dysphagia  -     Case Request; Standing  -     Case Request    Other orders  -     Follow Anesthesia Guidelines / Protocol; Future  -     Obtain Informed Consent; Future  -     Implement Anesthesia Orders Day of Procedure; Standing  -     Obtain Informed Consent; Standing  -     Verify bowel prep was successful; Standing  -     sodium-potassium-magnesium sulfates (Suprep Bowel Prep Kit) 17.5-3.13-1.6 GM/177ML solution oral solution; Take 1 bottle by mouth Every 12 (Twelve) Hours. Split dose prep as directed by office instructions provided.  2 bottles = one kit.  Dispense: 2 bottle; Refill: 0      ESOPHAGOGASTRODUODENOSCOPY WITH ANESTHESIA (N/A), COLONOSCOPY WITH ANESTHESIA (N/A)     Schedule patient for both colonoscopy and EGD.    Body mass index is 24.16 kg/m². Patient's Body mass index is 24.16 kg/m². BMI is within normal parameters. No follow-up required..    The risks, benefits, and alternatives of endoscopy were reviewed with the patient today.  Risks including perforation, with or without dilation, possibly requiring surgery.  Additional risks include risk of bleeding.  There is also the risk of a drug reaction or problems with anesthesia.  This will be discussed with the further by the anesthesia team on the day of the procedure. The benefits include the diagnosis and management of disease of the upper digestive tract.  Alternatives to endoscopy include upper GI series, laboratory testing, radiographic evaluation, or no intervention.  The patient verbalizes understanding and agrees.    All risks, benefits, alternatives, and indications of colonoscopy procedure have been discussed with the patient. Risks to include perforation of the colon requiring possible surgery or colostomy, risk of bleeding from biopsies or removal of colon tissue, possibility of missing a colon polyp or cancer,  or adverse drug reaction.  Benefits to include the diagnosis and management of disease of the colon and rectum. Alternatives to include barium enema, radiographic evaluation, lab testing or no intervention. Pt verbalizes understanding and agrees.

## 2021-02-05 ENCOUNTER — TRANSCRIBE ORDERS (OUTPATIENT)
Dept: GASTROENTEROLOGY | Facility: CLINIC | Age: 71
End: 2021-02-05

## 2021-02-05 DIAGNOSIS — Z01.818 PRE-OP TESTING: Primary | ICD-10-CM

## 2021-02-08 ENCOUNTER — TELEPHONE (OUTPATIENT)
Dept: GASTROENTEROLOGY | Facility: CLINIC | Age: 71
End: 2021-02-08

## 2021-02-08 NOTE — TELEPHONE ENCOUNTER
Pt called and canceled her procedure on 2/11/21. She states that her  has appt that day. She states that she will call back to reschedule. Recall set for 2 months.

## 2021-03-31 ENCOUNTER — TELEPHONE (OUTPATIENT)
Dept: GASTROENTEROLOGY | Facility: CLINIC | Age: 71
End: 2021-03-31

## 2021-03-31 NOTE — TELEPHONE ENCOUNTER
Pt called me just now wanting to r/s her endo/colon from Feb that had to be cancelled. She is rescheduled for Thurs 4/29/2021 @ 7:00am. She still has her prep and instructions-she is just going to change the arrival date/time on the set of instructions she has at home. I will fax sheet to scheduling and let Covid schedulers know she has rescheduled. Pt advised to call me back with any further questions/problems.

## 2021-04-09 ENCOUNTER — TRANSCRIBE ORDERS (OUTPATIENT)
Dept: LAB | Facility: HOSPITAL | Age: 71
End: 2021-04-09

## 2021-04-09 DIAGNOSIS — Z01.818 PRE-OP TESTING: Primary | ICD-10-CM

## 2021-04-26 ENCOUNTER — LAB (OUTPATIENT)
Dept: LAB | Facility: HOSPITAL | Age: 71
End: 2021-04-26

## 2021-04-26 LAB — SARS-COV-2 ORF1AB RESP QL NAA+PROBE: NOT DETECTED

## 2021-04-26 PROCEDURE — C9803 HOPD COVID-19 SPEC COLLECT: HCPCS | Performed by: INTERNAL MEDICINE

## 2021-04-26 PROCEDURE — U0004 COV-19 TEST NON-CDC HGH THRU: HCPCS | Performed by: INTERNAL MEDICINE

## 2021-04-26 PROCEDURE — U0005 INFEC AGEN DETEC AMPLI PROBE: HCPCS | Performed by: INTERNAL MEDICINE

## 2021-04-29 ENCOUNTER — ANESTHESIA EVENT (OUTPATIENT)
Dept: GASTROENTEROLOGY | Facility: HOSPITAL | Age: 71
End: 2021-04-29

## 2021-04-29 ENCOUNTER — ANESTHESIA (OUTPATIENT)
Dept: GASTROENTEROLOGY | Facility: HOSPITAL | Age: 71
End: 2021-04-29

## 2021-04-29 ENCOUNTER — HOSPITAL ENCOUNTER (OUTPATIENT)
Facility: HOSPITAL | Age: 71
Setting detail: HOSPITAL OUTPATIENT SURGERY
Discharge: HOME | End: 2021-04-29
Attending: INTERNAL MEDICINE | Admitting: INTERNAL MEDICINE

## 2021-04-29 VITALS
HEART RATE: 75 BPM | DIASTOLIC BLOOD PRESSURE: 51 MMHG | WEIGHT: 160 LBS | OXYGEN SATURATION: 92 % | SYSTOLIC BLOOD PRESSURE: 97 MMHG | RESPIRATION RATE: 21 BRPM | TEMPERATURE: 96.6 F | BODY MASS INDEX: 23.7 KG/M2 | HEIGHT: 69 IN

## 2021-04-29 DIAGNOSIS — R13.19 OTHER DYSPHAGIA: ICD-10-CM

## 2021-04-29 DIAGNOSIS — Z86.010 HISTORY OF ADENOMATOUS POLYP OF COLON: ICD-10-CM

## 2021-04-29 DIAGNOSIS — Z80.0 FAMILY HISTORY OF COLON CANCER: ICD-10-CM

## 2021-04-29 PROCEDURE — 45385 COLONOSCOPY W/LESION REMOVAL: CPT | Performed by: INTERNAL MEDICINE

## 2021-04-29 PROCEDURE — 25010000002 PROPOFOL 10 MG/ML EMULSION: Performed by: NURSE ANESTHETIST, CERTIFIED REGISTERED

## 2021-04-29 PROCEDURE — C1726 CATH, BAL DIL, NON-VASCULAR: HCPCS | Performed by: INTERNAL MEDICINE

## 2021-04-29 PROCEDURE — 88305 TISSUE EXAM BY PATHOLOGIST: CPT | Performed by: INTERNAL MEDICINE

## 2021-04-29 PROCEDURE — 43249 ESOPH EGD DILATION <30 MM: CPT | Performed by: INTERNAL MEDICINE

## 2021-04-29 RX ORDER — PROPOFOL 10 MG/ML
VIAL (ML) INTRAVENOUS AS NEEDED
Status: DISCONTINUED | OUTPATIENT
Start: 2021-04-29 | End: 2021-04-29 | Stop reason: SURG

## 2021-04-29 RX ORDER — SODIUM CHLORIDE 0.9 % (FLUSH) 0.9 %
10 SYRINGE (ML) INJECTION AS NEEDED
Status: DISCONTINUED | OUTPATIENT
Start: 2021-04-29 | End: 2021-04-29 | Stop reason: HOSPADM

## 2021-04-29 RX ORDER — SODIUM CHLORIDE 9 MG/ML
500 INJECTION, SOLUTION INTRAVENOUS CONTINUOUS PRN
Status: DISCONTINUED | OUTPATIENT
Start: 2021-04-29 | End: 2021-04-29 | Stop reason: HOSPADM

## 2021-04-29 RX ORDER — LIDOCAINE HYDROCHLORIDE 20 MG/ML
INJECTION, SOLUTION EPIDURAL; INFILTRATION; INTRACAUDAL; PERINEURAL AS NEEDED
Status: DISCONTINUED | OUTPATIENT
Start: 2021-04-29 | End: 2021-04-29 | Stop reason: SURG

## 2021-04-29 RX ADMIN — LIDOCAINE HYDROCHLORIDE 100 MG: 20 INJECTION, SOLUTION EPIDURAL; INFILTRATION; INTRACAUDAL; PERINEURAL at 08:37

## 2021-04-29 RX ADMIN — PROPOFOL 80 MG: 10 INJECTION, EMULSION INTRAVENOUS at 08:49

## 2021-04-29 RX ADMIN — PROPOFOL 50 MG: 10 INJECTION, EMULSION INTRAVENOUS at 09:00

## 2021-04-29 RX ADMIN — PROPOFOL 80 MG: 10 INJECTION, EMULSION INTRAVENOUS at 08:55

## 2021-04-29 RX ADMIN — PROPOFOL 80 MG: 10 INJECTION, EMULSION INTRAVENOUS at 08:44

## 2021-04-29 RX ADMIN — PROPOFOL 80 MG: 10 INJECTION, EMULSION INTRAVENOUS at 08:39

## 2021-04-29 RX ADMIN — PROPOFOL 80 MG: 10 INJECTION, EMULSION INTRAVENOUS at 08:37

## 2021-04-29 RX ADMIN — SODIUM CHLORIDE 500 ML: 9 INJECTION, SOLUTION INTRAVENOUS at 07:28

## 2021-04-29 RX ADMIN — PROPOFOL 100 MG: 10 INJECTION, EMULSION INTRAVENOUS at 09:06

## 2021-04-29 NOTE — ANESTHESIA PREPROCEDURE EVALUATION
Anesthesia Evaluation     Patient summary reviewed and Nursing notes reviewed   no history of anesthetic complications:  NPO Solid Status: > 8 hours  NPO Liquid Status: > 2 hours           Airway   Mallampati: II  TM distance: >3 FB  Neck ROM: full  No difficulty expected  Dental      Pulmonary    (+) sleep apnea on CPAP,   (-) COPD, asthma  Cardiovascular   Exercise tolerance: good (4-7 METS)    (-) hypertension, CAD      Neuro/Psych  (-) seizures, TIA, CVA  GI/Hepatic/Renal/Endo    (+)  GERD,    (-) liver disease, no renal disease, diabetes    Musculoskeletal     Abdominal    Substance History      OB/GYN          Other   arthritis,                      Anesthesia Plan    ASA 2     MAC     intravenous induction     Anesthetic plan, all risks, benefits, and alternatives have been provided, discussed and informed consent has been obtained with: patient.

## 2021-04-29 NOTE — ANESTHESIA POSTPROCEDURE EVALUATION
Patient: Earline Salazar    Procedure Summary     Date: 04/29/21 Room / Location: [facility] ENDOSCOPY 2 / [facility] ENDOSCOPY    Anesthesia Start: 0834 Anesthesia Stop: 0911    Procedures:       ESOPHAGOGASTRODUODENOSCOPY WITH ANESTHESIA (N/A )      COLONOSCOPY WITH ANESTHESIA (N/A ) Diagnosis:       History of adenomatous polyp of colon      Family history of colon cancer      Other dysphagia      (History of adenomatous polyp of colon [Z86.010])      (Family history of colon cancer [Z80.0])      (Other dysphagia [R13.19])    Surgeons: Xochitl Reyes MD Provider: Jaden Salas CRNA    Anesthesia Type: MAC ASA Status: 2          Anesthesia Type: MAC    Vitals  No vitals data found for the desired time range.          Post Anesthesia Care and Evaluation    Patient location during evaluation: PHASE II  Patient participation: complete - patient participated  Level of consciousness: awake and alert  Pain management: adequate  Airway patency: patent  Anesthetic complications: No anesthetic complications  PONV Status: none  Cardiovascular status: acceptable and stable  Respiratory status: acceptable and unassisted  Hydration status: acceptable

## 2021-04-30 LAB
CYTO UR: NORMAL
LAB AP CASE REPORT: NORMAL
PATH REPORT.FINAL DX SPEC: NORMAL
PATH REPORT.GROSS SPEC: NORMAL

## 2021-05-04 ENCOUNTER — OFFICE VISIT (OUTPATIENT)
Dept: OTOLARYNGOLOGY | Facility: CLINIC | Age: 71
End: 2021-05-04

## 2021-05-04 VITALS
HEART RATE: 70 BPM | WEIGHT: 162 LBS | BODY MASS INDEX: 23.92 KG/M2 | SYSTOLIC BLOOD PRESSURE: 123 MMHG | TEMPERATURE: 97.8 F | DIASTOLIC BLOOD PRESSURE: 65 MMHG

## 2021-05-04 DIAGNOSIS — H61.031: Primary | ICD-10-CM

## 2021-05-04 PROCEDURE — 99212 OFFICE O/P EST SF 10 MIN: CPT | Performed by: EMERGENCY MEDICINE

## 2021-05-04 NOTE — PATIENT INSTRUCTIONS
CONTACT INFORMATION:  The main office phone number is [phone]. For emergencies after hours and on weekends, this number will convert over to our answering service and the on call provider will answer. Please try to keep non emergent phone calls/ questions to office hours 9am-5pm Monday through Friday.     [email or web address]  As an alternative, you can sign up and use the Epic MyChart system for more direct and quicker access for non emergent questions/ problems.  [facility] [email or web address] com allows you to send messages to your doctor, view your test results, renew your prescriptions, schedule appointments, and more. To sign up, go to ZenSuite and click on the Sign Up Now link in the New User? box. Enter your [email or web address] com Activation Code exactly as it appears below along with the last four digits of your Social Security Number and your Date of Birth () to complete the sign-up process. If you do not sign up before the expiration date, you must request a new code.    [email or web address] Activation Code: Activation code not generated  Current [email or web address] Status: Active    If you have questions, you can email [email or web address] or call [phone] to talk to our [email or web address] staff. Remember, [email or web address] is NOT to be used for urgent needs. For medical emergencies, dial 911.

## 2021-05-04 NOTE — PROGRESS NOTES
ADRIÁN Lynch     Chief Complaint   Patient presents with   • Ear Problem      {  SnapShot   Notes   Encounters  Labs   Imaging   Meds   Media   Rslt Rev   :23}    HPI  Earline Salazar is a  71 y.o. female who is here for follow up.  Patient reports continued complaints of the outer ear, particularly at the tragus being intermittently sore.  She denies any drainage, erythema, or increased heat in the area.  There are no worsening factors identified.  She was prescribed kenalog cream previously which she still uses and reports almost immediate relief while using this.             Vital Signs:   Temp:  [97.8 °F (36.6 °C)] 97.8 °F (36.6 °C)  Heart Rate:  [70] 70  BP: (123)/(65) 123/65    Physical Exam  HENT:      Head: Normocephalic.      Right Ear: Hearing, tympanic membrane, ear canal and external ear normal.      Left Ear: Hearing, tympanic membrane, ear canal and external ear normal.   Neurological:      Mental Status: She is alert.                  Assessment/Plan    Assessment:   1. Chondritis of external ear, right        Plan:        Conservative management.             Return in about 6 months (around 11/4/2021) for Follow up with ADRIÁN Puga.         ADRIÁN Lynch  05/04/21  11:20 CDT

## 2021-05-14 ENCOUNTER — TRANSCRIBE ORDERS (OUTPATIENT)
Dept: ADMINISTRATIVE | Facility: HOSPITAL | Age: 71
End: 2021-05-14

## 2021-05-14 DIAGNOSIS — N18.30 STAGE 3 CHRONIC KIDNEY DISEASE, UNSPECIFIED WHETHER STAGE 3A OR 3B CKD (HCC): Primary | ICD-10-CM

## 2021-05-17 ENCOUNTER — HOSPITAL ENCOUNTER (OUTPATIENT)
Dept: ULTRASOUND IMAGING | Facility: HOSPITAL | Age: 71
Discharge: HOME | End: 2021-05-17
Admitting: PHYSICIAN ASSISTANT

## 2021-05-17 PROCEDURE — 76775 US EXAM ABDO BACK WALL LIM: CPT

## 2021-06-15 ENCOUNTER — OFFICE VISIT (OUTPATIENT)
Dept: URGENT CARE | Age: 71
End: 2021-06-15
Payer: MEDICARE

## 2021-06-15 VITALS
BODY MASS INDEX: 23.99 KG/M2 | SYSTOLIC BLOOD PRESSURE: 116 MMHG | TEMPERATURE: 98.6 F | DIASTOLIC BLOOD PRESSURE: 62 MMHG | HEIGHT: 69 IN | WEIGHT: 162 LBS | RESPIRATION RATE: 20 BRPM | HEART RATE: 70 BPM | OXYGEN SATURATION: 95 %

## 2021-06-15 DIAGNOSIS — H10.31 ACUTE CONJUNCTIVITIS OF RIGHT EYE, UNSPECIFIED ACUTE CONJUNCTIVITIS TYPE: Primary | ICD-10-CM

## 2021-06-15 PROCEDURE — G8420 CALC BMI NORM PARAMETERS: HCPCS | Performed by: NURSE PRACTITIONER

## 2021-06-15 PROCEDURE — 99213 OFFICE O/P EST LOW 20 MIN: CPT | Performed by: NURSE PRACTITIONER

## 2021-06-15 PROCEDURE — 1123F ACP DISCUSS/DSCN MKR DOCD: CPT | Performed by: NURSE PRACTITIONER

## 2021-06-15 PROCEDURE — 1036F TOBACCO NON-USER: CPT | Performed by: NURSE PRACTITIONER

## 2021-06-15 PROCEDURE — 92230 FLUORESCEIN ANGIOSCOPY I&R: CPT | Performed by: NURSE PRACTITIONER

## 2021-06-15 PROCEDURE — 4040F PNEUMOC VAC/ADMIN/RCVD: CPT | Performed by: NURSE PRACTITIONER

## 2021-06-15 PROCEDURE — 3017F COLORECTAL CA SCREEN DOC REV: CPT | Performed by: NURSE PRACTITIONER

## 2021-06-15 PROCEDURE — 1090F PRES/ABSN URINE INCON ASSESS: CPT | Performed by: NURSE PRACTITIONER

## 2021-06-15 PROCEDURE — G8427 DOCREV CUR MEDS BY ELIG CLIN: HCPCS | Performed by: NURSE PRACTITIONER

## 2021-06-15 PROCEDURE — G8399 PT W/DXA RESULTS DOCUMENT: HCPCS | Performed by: NURSE PRACTITIONER

## 2021-06-15 RX ORDER — TOBRAMYCIN 3 MG/ML
1 SOLUTION/ DROPS OPHTHALMIC EVERY 4 HOURS
Qty: 1 BOTTLE | Refills: 0 | Status: SHIPPED | OUTPATIENT
Start: 2021-06-15 | End: 2021-06-22

## 2021-06-15 ASSESSMENT — ENCOUNTER SYMPTOMS
EYE ITCHING: 1
EYE DISCHARGE: 1
ALLERGIC/IMMUNOLOGIC NEGATIVE: 1
EYE REDNESS: 1

## 2021-06-15 ASSESSMENT — VISUAL ACUITY: OU: 1

## 2021-06-15 NOTE — PROGRESS NOTES
[address] [facility]  [address] [facility] [address] [id number]  Dept: [phone]  Dept Fax: [phone]  Loc: [phone]    Inocente Jain is a 80 y.o. female who presents today for her medical conditions/complaintsas noted below. Inocente Jain is c/o of Eye Problem (patient states she feels like something is in her right eye)        HPI:     Eye Problem   The right eye is affected. This is a new problem. The current episode started yesterday. The problem occurs constantly. The problem has been unchanged. There was no injury mechanism. The pain is at a severity of 2/10. The pain is mild. There is no known exposure to pink eye. She does not wear contacts. Associated symptoms include an eye discharge, eye redness and itching. Pertinent negatives include no fever. She has tried eye drops (Visine) for the symptoms. The treatment provided mild relief. She denies any known injury to right eye but has been doing yard work. She used some Visine drops with little improvement yesterday. Past Medical History:   Diagnosis Date    Depression     Restless legs syndrome      Past Surgical History:   Procedure Laterality Date    APPENDECTOMY      ARM SURGERY      HYSTERECTOMY      KNEE SURGERY      TOE SURGERY      TONSILLECTOMY         History reviewed. No pertinent family history. Social History     Tobacco Use    Smoking status: Never Smoker    Smokeless tobacco: Never Used   Substance Use Topics    Alcohol use: Yes      Current Outpatient Medications   Medication Sig Dispense Refill    tobramycin (TOBREX) 0.3 % ophthalmic solution Place 1 drop into the right eye every 4 hours for 7 days 1 Bottle 0    buPROPion (WELLBUTRIN XL) 300 MG extended release tablet       pramipexole (MIRAPEX) 1 MG tablet Take 1 mg by mouth 3 times daily      spironolactone (ALDACTONE) 25 MG tablet        No current facility-administered medications for this visit.      No Known Allergies    Health Maintenance   Topic Date Due    Potassium monitoring  Never done    Creatinine monitoring  Never done    Hepatitis C screen  Never done    COVID-19 Vaccine (1) Never done    DTaP/Tdap/Td vaccine (1 - Tdap) Never done    Lipid screen  Never done    Shingles Vaccine (1 of 2) Never done    Colon cancer screen colonoscopy  Never done    Pneumococcal 65+ years Vaccine (1 of 1 - PPSV23) Never done   [facility] Annual Wellness Visit (AWV)  Never done    Flu vaccine (Season Ended) 09/01/2021    Breast cancer screen  11/18/2022    DEXA (modify frequency per FRAX score)  Completed    Hepatitis A vaccine  Aged Out    Hepatitis B vaccine  Aged Out    Hib vaccine  Aged Out    Meningococcal (ACWY) vaccine  Aged Out       Subjective:     Review of Systems   Constitutional: Negative for activity change, appetite change, chills and fever. Eyes: Positive for discharge, redness and itching. Cardiovascular: Negative. Skin: Negative for rash. Allergic/Immunologic: Negative. Hematological: Negative.        :Objective      Physical Exam  Vitals and nursing note reviewed. Constitutional:       General: She is awake. She is not in acute distress. Appearance: Normal appearance. She is well-developed, well-groomed and normal weight. She is not ill-appearing or toxic-appearing. HENT:      Head: Normocephalic. Right Ear: Hearing normal.      Left Ear: Hearing normal.      Nose: Nose normal.      Mouth/Throat:      Lips: Pink. Eyes:      General: Vision grossly intact. Right eye: No foreign body or discharge. Conjunctiva/sclera:      Right eye: Right conjunctiva is injected. Pupils: Pupils are equal, round, and reactive to light. Right eye: No fluorescein uptake.       Comments: Fluorescein staining to right eye - no noted corneal abrasion  Right upper eyelid slightly swollen   Neck:      Trachea: Phonation normal.   Cardiovascular:      Rate and Rhythm: Normal rate and regular rhythm. Pulmonary:      Effort: Pulmonary effort is normal. No respiratory distress. Abdominal:      Palpations: Abdomen is soft. Musculoskeletal:         General: No tenderness or deformity. Normal range of motion. Cervical back: Full passive range of motion without pain and neck supple. Skin:     General: Skin is warm and dry. Capillary Refill: Capillary refill takes less than 2 seconds. Neurological:      General: No focal deficit present. Mental Status: She is alert, oriented to person, place, and time and easily aroused. Psychiatric:         Attention and Perception: Attention normal.         Mood and Affect: Mood normal.         Speech: Speech normal.         Behavior: Behavior normal. Behavior is cooperative. /62   Pulse 70   Temp 98.6 °F (37 °C)   Resp 20   Ht 5' 9\" (1.753 m)   Wt 162 lb (73.5 kg)   SpO2 95%   BMI 23.92 kg/m²     :Assessment       Diagnosis Orders   1. Acute conjunctivitis of right eye, unspecified acute conjunctivitis type         :Plan    No orders of the defined types were placed in this encounter. No follow-ups on file. Orders Placed This Encounter   Medications    tobramycin (TOBREX) 0.3 % ophthalmic solution     Sig: Place 1 drop into the right eye every 4 hours for 7 days     Dispense:  1 Bottle     Refill:  0        Patient Instructions     Good hand hygiene  Tobramycin drop to right eye every 2 hrs today, may begin every 4 hrs tomorrow  Follow-up with optometry if no improved symptoms in the next 48 hrs or if symptoms worsen  Patient Education        Pinkeye: Care Instructions  Your Care Instructions     Pinkeye is redness and swelling of the eye surface and the conjunctiva (the lining of the eyelid and the covering of the white part of the eye). Pinkeye is also called conjunctivitis. Pinkeye is often caused by infection with bacteria or a virus. Dry air, allergies, smoke, and chemicals are other common causes.   Pinkeye often clears on its own in 7 to 10 days. Antibiotics only help if the pinkeye is caused by bacteria. Pinkeye caused by infection spreads easily. If an allergy or chemical is causing pinkeye, it will not go away unless you can avoid whatever is causing it. Follow-up care is a key part of your treatment and safety. Be sure to make and go to all appointments, and call your doctor if you are having problems. It's also a good idea to know your test results and keep a list of the medicines you take. How can you care for yourself at home? · Wash your hands often. Always wash them before and after you treat pinkeye or touch your eyes or face. · Use moist cotton or a clean, wet cloth to remove crust. Wipe from the inside corner of the eye to the outside. Use a clean part of the cloth for each wipe. · Put cold or warm wet cloths on your eye a few times a day if the eye hurts. · Do not wear contact lenses or eye makeup until the pinkeye is gone. Throw away any eye makeup you were using when you got pinkeye. Clean your contacts and storage case. If you wear disposable contacts, use a new pair when your eye has cleared and it is safe to wear contacts again. · If the doctor gave you antibiotic ointment or eyedrops, use them as directed. Use the medicine for as long as instructed, even if your eye starts looking better soon. Keep the bottle tip clean, and do not let it touch the eye area. · To put in eyedrops or ointment:  ? Tilt your head back, and pull your lower eyelid down with one finger. ? Drop or squirt the medicine inside the lower lid. ? Close your eye for 30 to 60 seconds to let the drops or ointment move around. ? Do not touch the ointment or dropper tip to your eyelashes or any other surface. · Do not share towels, pillows, or washcloths while you have pinkeye. When should you call for help?    Call your doctor now or seek immediate medical care if:    · You have pain in your eye, not just irritation on the surface.     · You have a change in vision or loss of vision.     · You have an increase in discharge from the eye.     · Your eye has not started to improve or begins to get worse within 48 hours after you start using antibiotics.     · Pinkeye lasts longer than 7 days. Watch closely for changes in your health, and be sure to contact your doctor if you have any problems. Where can you learn more? Go to [email or web address]://[email or web address].[email or web address]. org and sign in to your Leap[employer] account. Enter Y392 in the Total Prestige box to learn more about \"Pinkeye: Care Instructions. \"     If you do not have an account, please click on the \"Sign Up Now\" link. Current as of: February 26, 2020               Content Version: 12.8  © 2006-2021 Healthwise, Incorporated. Care instructions adapted under license by [facility] ([facility]). If you have questions about a medical condition or this instruction, always ask your healthcare professional. [place] [address] any warranty or liability for your use of this information. Patient given educational materials- see patient instructions. Discussed use, benefit, and side effects of prescribedmedications. All patient questions answered. Pt voiced understanding.        Electronically signed by RM Munguia CNP on 6/15/2021 at 8:16 AM

## 2021-09-14 ENCOUNTER — HOSPITAL ENCOUNTER (OUTPATIENT)
Dept: GENERAL RADIOLOGY | Age: 71
Discharge: HOME | End: 2021-09-14
Payer: MEDICARE

## 2021-09-14 DIAGNOSIS — M79.671 PAIN IN RIGHT FOOT: ICD-10-CM

## 2021-09-14 DIAGNOSIS — M79.672 PAIN IN LEFT FOOT: ICD-10-CM

## 2021-09-14 PROCEDURE — 73630 X-RAY EXAM OF FOOT: CPT

## 2021-09-16 ENCOUNTER — HOSPITAL ENCOUNTER (OUTPATIENT)
Dept: NON INVASIVE DIAGNOSTICS | Age: 71
Discharge: HOME | End: 2021-09-16
Payer: MEDICARE

## 2021-09-16 DIAGNOSIS — I70.213 ATHSCL NATIVE ARTERIES OF EXTRM W INTRMT CLAUD, BI LEGS (HCC): ICD-10-CM

## 2021-09-16 PROCEDURE — 93923 UPR/LXTR ART STDY 3+ LVLS: CPT

## 2021-10-21 DIAGNOSIS — H61.003: ICD-10-CM

## 2021-10-21 RX ORDER — TRIAMCINOLONE ACETONIDE 0.25 MG/G
CREAM TOPICAL 2 TIMES DAILY
Qty: 15 G | Refills: 0 | Status: SHIPPED | OUTPATIENT
Start: 2021-10-21 | End: 2022-10-05 | Stop reason: SDUPTHER

## 2021-11-05 ENCOUNTER — OFFICE VISIT (OUTPATIENT)
Dept: OTOLARYNGOLOGY | Facility: CLINIC | Age: 71
End: 2021-11-05

## 2021-11-05 VITALS
SYSTOLIC BLOOD PRESSURE: 134 MMHG | HEART RATE: 76 BPM | HEIGHT: 69 IN | TEMPERATURE: 97.3 F | WEIGHT: 166 LBS | DIASTOLIC BLOOD PRESSURE: 74 MMHG | BODY MASS INDEX: 24.59 KG/M2

## 2021-11-05 DIAGNOSIS — H61.003: Primary | ICD-10-CM

## 2021-11-05 PROCEDURE — 99212 OFFICE O/P EST SF 10 MIN: CPT | Performed by: EMERGENCY MEDICINE

## 2021-11-05 RX ORDER — ERGOCALCIFEROL 1.25 MG/1
50000 CAPSULE ORAL WEEKLY
COMMUNITY
End: 2023-03-08

## 2021-11-05 NOTE — PROGRESS NOTES
ADRIÁN Lynch     Chief Complaint   Patient presents with   • Ear Problem          HPI  Earline Salazar is a  71 y.o. female who is here for follow up. Her symptoms are stable.  She is only using kenalog cream very occasionally.          Vital Signs:   Temp:  [97.3 °F (36.3 °C)] 97.3 °F (36.3 °C)  Heart Rate:  [76] 76  BP: (134)/(74) 134/74    ENT Physical Exam  Constitutional  Appearance: patient appears well-developed, well-nourished and well-groomed,  Communication/Voice: communication appropriate for developmental age; vocal quality normal;  Head and Face  Appearance: head appears normal, face appears normal and face appears atraumatic;  Palpation: facial palpation normal;  Salivary: glands normal;  Ear  Hearing: intact;  Auricles: right auricle normal; left auricle normal;  External Mastoids: right external mastoid normal; left external mastoid normal;  Ear Canals: right ear canal normal; left ear canal normal;  Tympanic Membranes: right tympanic membrane normal; left tympanic membrane normal;      Result Review            Assessment/Plan     Diagnoses and all orders for this visit:    1. Chondrodermatitis nodularis chronica helicis, bilateral (Primary)            Continue current management plan.     Return in about 1 year (around 11/5/2022) for Follow up with ADRIÁN Puga.         ADRIÁN Lynch  11/05/21  09:41 CDT

## 2021-12-27 ENCOUNTER — HOSPITAL ENCOUNTER (OUTPATIENT)
Dept: WOMENS IMAGING | Age: 71
Discharge: HOME | End: 2021-12-27
Payer: MEDICARE

## 2021-12-27 DIAGNOSIS — Z12.31 ENCOUNTER FOR SCREENING MAMMOGRAM FOR MALIGNANT NEOPLASM OF BREAST: ICD-10-CM

## 2021-12-27 PROCEDURE — 77063 BREAST TOMOSYNTHESIS BI: CPT

## 2022-01-24 DIAGNOSIS — J32.8 OTHER CHRONIC SINUSITIS: ICD-10-CM

## 2022-01-24 DIAGNOSIS — J30.9 ALLERGIC RHINITIS, UNSPECIFIED SEASONALITY, UNSPECIFIED TRIGGER: Primary | ICD-10-CM

## 2022-01-24 DIAGNOSIS — H69.83 DYSFUNCTION OF BOTH EUSTACHIAN TUBES: ICD-10-CM

## 2022-01-24 RX ORDER — AZELASTINE HYDROCHLORIDE 137 UG/1
2 SPRAY, METERED NASAL DAILY
Qty: 30 ML | Refills: 11 | Status: SHIPPED | OUTPATIENT
Start: 2022-01-24 | End: 2023-03-08

## 2022-04-18 LAB — SARS-COV-2, PCR: NOT DETECTED

## 2022-04-19 ENCOUNTER — HOSPITAL ENCOUNTER (OUTPATIENT)
Dept: PULMONOLOGY | Age: 72
Discharge: HOME | End: 2022-04-19
Payer: MEDICARE

## 2022-04-19 DIAGNOSIS — R05.9 COUGH, UNSPECIFIED: ICD-10-CM

## 2022-04-19 PROCEDURE — 94727 GAS DIL/WSHOT DETER LNG VOL: CPT

## 2022-04-19 PROCEDURE — 94729 DIFFUSING CAPACITY: CPT

## 2022-04-19 PROCEDURE — 6360000002 HC RX W HCPCS: Performed by: PHYSICIAN ASSISTANT

## 2022-04-19 PROCEDURE — 94060 EVALUATION OF WHEEZING: CPT

## 2022-04-19 RX ORDER — ALBUTEROL SULFATE 2.5 MG/3ML
2.5 SOLUTION RESPIRATORY (INHALATION) EVERY 6 HOURS PRN
Status: DISCONTINUED | OUTPATIENT
Start: 2022-04-19 | End: 2022-04-21 | Stop reason: HOSPADM

## 2022-04-19 RX ADMIN — ALBUTEROL SULFATE 2.5 MG: 2.5 SOLUTION RESPIRATORY (INHALATION) at 08:46

## 2022-04-19 NOTE — PROCEDURES
Media Information             Pulmonary Function Study    Interpretation:    The FVC is Normal. FEV1 is Normal. FEV1/FVC ratio is Normal. After bronchodilator therapy there was no significant improvement in FEV1. Total lung capacity is Normal. Residual volume is Normal.      Diffusing lung capacity when corrected for alveolar volume is mildly reduced. Impression:    1. Reduced diffusing lung  capacity in the absence of significant spirometric or lung volume abnormality. Differential diagnosis includes occult interstitial lung disease vs. pulmonary vascular disease.         Cara Allred MD, FCCP, [facility]

## 2022-04-25 PROCEDURE — 94729 DIFFUSING CAPACITY: CPT | Performed by: INTERNAL MEDICINE

## 2022-04-25 PROCEDURE — 94727 GAS DIL/WSHOT DETER LNG VOL: CPT | Performed by: INTERNAL MEDICINE

## 2022-04-25 PROCEDURE — 94060 EVALUATION OF WHEEZING: CPT | Performed by: INTERNAL MEDICINE

## 2022-06-22 ENCOUNTER — OFFICE VISIT (OUTPATIENT)
Dept: PULMONOLOGY | Age: 72
End: 2022-06-22
Payer: MEDICARE

## 2022-06-22 ENCOUNTER — HOSPITAL ENCOUNTER (OUTPATIENT)
Dept: LAB | Age: 72
Discharge: HOME | End: 2022-06-22
Payer: MEDICARE

## 2022-06-22 ENCOUNTER — HOSPITAL ENCOUNTER (OUTPATIENT)
Dept: CT IMAGING | Age: 72
Discharge: HOME | End: 2022-06-22
Payer: MEDICARE

## 2022-06-22 ENCOUNTER — TELEPHONE (OUTPATIENT)
Dept: PULMONOLOGY | Age: 72
End: 2022-06-22

## 2022-06-22 VITALS
OXYGEN SATURATION: 98 % | SYSTOLIC BLOOD PRESSURE: 116 MMHG | WEIGHT: 169.8 LBS | HEIGHT: 69 IN | BODY MASS INDEX: 25.15 KG/M2 | DIASTOLIC BLOOD PRESSURE: 64 MMHG | HEART RATE: 80 BPM

## 2022-06-22 DIAGNOSIS — Z77.22 SECOND HAND SMOKE EXPOSURE: ICD-10-CM

## 2022-06-22 DIAGNOSIS — R05.3 CHRONIC COUGH: Primary | ICD-10-CM

## 2022-06-22 DIAGNOSIS — R94.2 DIFFUSION CAPACITY OF LUNG (DL), DECREASED: ICD-10-CM

## 2022-06-22 DIAGNOSIS — R05.3 CHRONIC COUGH: ICD-10-CM

## 2022-06-22 DIAGNOSIS — K21.9 GASTROESOPHAGEAL REFLUX DISEASE WITHOUT ESOPHAGITIS: ICD-10-CM

## 2022-06-22 DIAGNOSIS — R13.19 ESOPHAGEAL DYSPHAGIA: ICD-10-CM

## 2022-06-22 LAB
ALBUMIN SERPL-MCNC: 4.1 G/DL (ref 3.5–5.2)
ALP BLD-CCNC: 92 U/L (ref 35–104)
ALT SERPL-CCNC: 32 U/L (ref 5–33)
ANION GAP SERPL CALCULATED.3IONS-SCNC: 13 MMOL/L (ref 7–19)
AST SERPL-CCNC: 25 U/L (ref 5–32)
BASOPHILS ABSOLUTE: 0 K/UL (ref 0–0.2)
BASOPHILS RELATIVE PERCENT: 0.7 % (ref 0–1)
BILIRUB SERPL-MCNC: 0.7 MG/DL (ref 0.2–1.2)
BUN BLDV-MCNC: 23 MG/DL (ref 8–23)
CALCIUM SERPL-MCNC: 9.1 MG/DL (ref 8.8–10.2)
CHLORIDE BLD-SCNC: 103 MMOL/L (ref 98–111)
CO2: 25 MMOL/L (ref 22–29)
CREAT SERPL-MCNC: 0.9 MG/DL (ref 0.5–0.9)
EOSINOPHILS ABSOLUTE: 0.2 K/UL (ref 0–0.6)
EOSINOPHILS RELATIVE PERCENT: 3 % (ref 0–5)
GFR AFRICAN AMERICAN: >59
GFR AFRICAN AMERICAN: >60
GFR NON-AFRICAN AMERICAN: >60
GFR NON-AFRICAN AMERICAN: >60
GLUCOSE BLD-MCNC: 109 MG/DL (ref 74–109)
HCT VFR BLD CALC: 41.4 % (ref 37–47)
HEMOGLOBIN: 13.6 G/DL (ref 12–16)
IGG: 754 MG/DL (ref 700–1600)
IGM: 111 MG/DL (ref 40–230)
IMMATURE GRANULOCYTES #: 0 K/UL
LYMPHOCYTES ABSOLUTE: 0.9 K/UL (ref 1.1–4.5)
LYMPHOCYTES RELATIVE PERCENT: 16.6 % (ref 20–40)
MCH RBC QN AUTO: 31.2 PG (ref 27–31)
MCHC RBC AUTO-ENTMCNC: 32.9 G/DL (ref 33–37)
MCV RBC AUTO: 95 FL (ref 81–99)
MONOCYTES ABSOLUTE: 0.3 K/UL (ref 0–0.9)
MONOCYTES RELATIVE PERCENT: 5.5 % (ref 0–10)
NEUTROPHILS ABSOLUTE: 4 K/UL (ref 1.5–7.5)
NEUTROPHILS RELATIVE PERCENT: 74 % (ref 50–65)
PDW BLD-RTO: 12.7 % (ref 11.5–14.5)
PERFORMED ON: NORMAL
PLATELET # BLD: 264 K/UL (ref 130–400)
PMV BLD AUTO: 9.7 FL (ref 9.4–12.3)
POC CREATININE: 0.8 MG/DL (ref 0.3–1.3)
POC SAMPLE TYPE: NORMAL
POTASSIUM SERPL-SCNC: 4.6 MMOL/L (ref 3.5–5)
RBC # BLD: 4.36 M/UL (ref 4.2–5.4)
RHEUMATOID FACTOR: <10 IU/ML
SEDIMENTATION RATE, ERYTHROCYTE: 12 MM/HR (ref 0–25)
SODIUM BLD-SCNC: 141 MMOL/L (ref 136–145)
TOTAL PROTEIN: 6.6 G/DL (ref 6.6–8.7)
WBC # BLD: 5.4 K/UL (ref 4.8–10.8)

## 2022-06-22 PROCEDURE — 1123F ACP DISCUSS/DSCN MKR DOCD: CPT | Performed by: INTERNAL MEDICINE

## 2022-06-22 PROCEDURE — G8399 PT W/DXA RESULTS DOCUMENT: HCPCS | Performed by: INTERNAL MEDICINE

## 2022-06-22 PROCEDURE — 71275 CT ANGIOGRAPHY CHEST: CPT | Performed by: RADIOLOGY

## 2022-06-22 PROCEDURE — 6360000004 HC RX CONTRAST MEDICATION: Performed by: INTERNAL MEDICINE

## 2022-06-22 PROCEDURE — 1090F PRES/ABSN URINE INCON ASSESS: CPT | Performed by: INTERNAL MEDICINE

## 2022-06-22 PROCEDURE — 1036F TOBACCO NON-USER: CPT | Performed by: INTERNAL MEDICINE

## 2022-06-22 PROCEDURE — 71275 CT ANGIOGRAPHY CHEST: CPT

## 2022-06-22 PROCEDURE — 99204 OFFICE O/P NEW MOD 45 MIN: CPT | Performed by: INTERNAL MEDICINE

## 2022-06-22 PROCEDURE — 82565 ASSAY OF CREATININE: CPT

## 2022-06-22 PROCEDURE — G8427 DOCREV CUR MEDS BY ELIG CLIN: HCPCS | Performed by: INTERNAL MEDICINE

## 2022-06-22 PROCEDURE — G8417 CALC BMI ABV UP PARAM F/U: HCPCS | Performed by: INTERNAL MEDICINE

## 2022-06-22 PROCEDURE — 3017F COLORECTAL CA SCREEN DOC REV: CPT | Performed by: INTERNAL MEDICINE

## 2022-06-22 RX ORDER — ALBUTEROL SULFATE 0.63 MG/3ML
SOLUTION RESPIRATORY (INHALATION)
COMMUNITY
Start: 2022-03-29

## 2022-06-22 RX ORDER — AZELASTINE 1 MG/ML
274 SPRAY, METERED NASAL DAILY
COMMUNITY
Start: 2022-01-24

## 2022-06-22 RX ORDER — LEVOCETIRIZINE DIHYDROCHLORIDE 5 MG/1
5 TABLET, FILM COATED ORAL EVERY EVENING
COMMUNITY

## 2022-06-22 RX ORDER — TRIAMCINOLONE ACETONIDE 0.25 MG/G
CREAM TOPICAL 2 TIMES DAILY
COMMUNITY
Start: 2021-10-21

## 2022-06-22 RX ORDER — PANTOPRAZOLE SODIUM 40 MG/1
40 TABLET, DELAYED RELEASE ORAL
Qty: 60 TABLET | Refills: 11 | Status: SHIPPED | OUTPATIENT
Start: 2022-06-22

## 2022-06-22 RX ORDER — PANTOPRAZOLE SODIUM 40 MG/1
TABLET, DELAYED RELEASE ORAL
COMMUNITY
Start: 2022-06-01

## 2022-06-22 RX ORDER — MONTELUKAST SODIUM 10 MG/1
TABLET ORAL
COMMUNITY
Start: 2022-06-03

## 2022-06-22 RX ORDER — TRAZODONE HYDROCHLORIDE 50 MG/1
TABLET ORAL
COMMUNITY
Start: 2022-03-19

## 2022-06-22 RX ORDER — CITALOPRAM 20 MG/1
TABLET ORAL
COMMUNITY
Start: 2022-05-26

## 2022-06-22 RX ORDER — ALBUTEROL SULFATE 90 UG/1
AEROSOL, METERED RESPIRATORY (INHALATION)
COMMUNITY
Start: 2022-03-31

## 2022-06-22 RX ORDER — FAMOTIDINE 20 MG
TABLET ORAL
COMMUNITY

## 2022-06-22 RX ORDER — ERGOCALCIFEROL (VITAMIN D2) 1250 MCG
50000 CAPSULE ORAL WEEKLY
COMMUNITY

## 2022-06-22 RX ADMIN — IOPAMIDOL 70 ML: 755 INJECTION, SOLUTION INTRAVENOUS at 12:15

## 2022-06-22 ASSESSMENT — ENCOUNTER SYMPTOMS
BACK PAIN: 0
ABDOMINAL PAIN: 0
COUGH: 1
CHEST TIGHTNESS: 0
ABDOMINAL DISTENTION: 0
WHEEZING: 0
SHORTNESS OF BREATH: 1
RHINORRHEA: 0
APNEA: 0
ANAL BLEEDING: 0

## 2022-06-22 NOTE — PROGRESS NOTES
Pulmonary and Sleep Medicine    Maliha Grullon (:  1950) is a 71 y.o. female,New patient, here for evaluation of the following chief complaint(s):  New Patient (Referral for persistant cough for six months. Pt states cough is a little better but not much. Pt also had an abnormal pft)      Referring physician:  Marga Leventhal, Pa-c  [facility] [place] [address]     ASSESSMENT/PLAN:  1. Chronic cough  -     LEIF Screen with Reflex; Future  -     CBC with Auto Differential; Future  -     IgE; Future  -     IgG; Future  -     IgM; Future  -     Sedimentation Rate; Future  -     Rheumatoid Factor; Future  -     Comprehensive Metabolic Panel; Future  -     CTA PULMONARY W CONTRAST; Future  2. Second hand smoke exposure  3. Diffusion capacity of lung (dl), decreased  -     CTA PULMONARY W CONTRAST; Future  4. Esophageal dysphagia  -     pantoprazole (PROTONIX) 40 MG tablet; Take 1 tablet by mouth 2 times daily (before meals), Disp-60 tablet, R-11Normal  -     Ambulatory referral to Gastroenterology  5. Gastroesophageal reflux disease without esophagitis  -     pantoprazole (PROTONIX) 40 MG tablet; Take 1 tablet by mouth 2 times daily (before meals), Disp-60 tablet, R-11Normal  -     Ambulatory referral to Gastroenterology        Chronic cough most likely related to reflux. We will augment her proton pump inhibitor treatment to twice daily. She does have evidence of retained food in her esophagus on CT imaging. Cannot totally exclude microaspiration. We will obtain an autoimmune and common variable immune deficiency work-up. Do CT pulmonary angiogram rule out chronic thromboembolic disease although this seems to be unlikely. Cj Chua MD, [facility], [facility]    Return in about 2 weeks (around 2022). SUBJECTIVE/OBJECTIVE:  The patient is here for evaluation of a chronic cough. She has been coughing for little over 6 months. Her cough is nonproductive.   She describes it as a tickle in her throat. Her cough is more prevalent during the night. The cough wakes her up from sleep. She does have symptoms of reflux. She is on proton pump inhibitor. She says she does have reflux symptoms despite being on proton pump inhibitor. She had been tried on inhaled corticosteroid long-acting beta agonist namely Symbicort and she was also put on Spiriva. She did not feel that any of that helped her symptoms. CT of the chest done recently to evaluate for lung cancer was negative. She did have pulmonary function study that showed reduced diffusing capacity in the absence of significant spirometric and lung volume abnormalities. She quit smoking about 18 years ago. Denies wheezing. She describes persistent reflux symptoms despite Protonix also she describes the food getting stuck in her throat. She underwent upper endoscopy and bougienage at [address] [place] [address]. Prior to Visit Medications    Medication Sig Taking?  Authorizing Provider   citalopram (CELEXA) 20 MG tablet TAKE 1 TABLET BY MOUTH ONCE DAILY Yes Historical Provider, MD   ergocalciferol (ERGOCALCIFEROL) 1.25 MG (26002 UT) capsule Take 50,000 Units by mouth once a week Yes Historical Provider, MD   pantoprazole (PROTONIX) 40 MG tablet TAKE 1 TABLET BY MOUTH ONCE DAILY FOR REFLUX Yes Historical Provider, MD   montelukast (SINGULAIR) 10 MG tablet TAKE 1 TABLET BY MOUTH ONCE DAILY FOR ALLERGIES Yes Historical Provider, MD   traZODone (DESYREL) 50 MG tablet TAKE 1 TABLET BY MOUTH AT BEDTIME AS NEEDED Yes Historical Provider, MD   triamcinolone (KENALOG) 0.025 % cream Apply topically 2 times daily Yes Historical Provider, MD   levocetirizine (XYZAL) 5 MG tablet Take 5 mg by mouth every evening Yes Historical Provider, MD   Vitamin D, Cholecalciferol, 25 MCG (1000 UT) CAPS  Yes Historical Provider, MD   azelastine (ASTELIN) 0.1 % nasal spray 274 mcg by Nasal route daily Yes Historical Provider, MD   Apoaequorin (PREVAGEN) 10 MG CAPS Take by mouth Yes Historical Provider, MD   albuterol sulfate HFA (PROVENTIL;VENTOLIN;PROAIR) 108 (90 Base) MCG/ACT inhaler INHALE 2 PUFFS BY MOUTH EVERY 4 HOURS AS NEEDED FOR SHORTNESS OF BREATH OR WHEEZING Yes Historical Provider, MD   albuterol (ACCUNEB) 0.63 MG/3ML nebulizer solution USE 1 VIAL IN NEBULIZER AS DIRECTED Yes Historical Provider, MD   tiotropium (SPIRIVA RESPIMAT) 2.5 MCG/ACT AERS inhaler Inhale 2 puffs into the lungs daily Yes Historical Provider, MD   buPROPion (WELLBUTRIN XL) 300 MG extended release tablet  Yes Historical Provider, MD   pramipexole (MIRAPEX) 1 MG tablet Take 1 mg by mouth 3 times daily  Historical Provider, MD   spironolactone (ALDACTONE) 25 MG tablet   Historical Provider, MD        Review of Systems   Constitutional: Negative for activity change, appetite change, chills, diaphoresis and fatigue. HENT: Negative for congestion, dental problem, drooling, ear discharge, postnasal drip and rhinorrhea. Eyes: Negative for visual disturbance. Respiratory: Positive for cough and shortness of breath. Negative for apnea, chest tightness and wheezing. Gastrointestinal: Negative for abdominal distention, abdominal pain and anal bleeding. Endocrine: Negative for cold intolerance, heat intolerance and polydipsia. Genitourinary: Negative for difficulty urinating, dysuria, enuresis and flank pain. Musculoskeletal: Negative for arthralgias, back pain and gait problem. Allergic/Immunologic: Negative for environmental allergies. Neurological: Negative for dizziness, facial asymmetry, light-headedness and headaches. Vitals:    06/22/22 0919   BP: 116/64   Pulse: 80   SpO2: 98%     BMI Readings from Last 1 Encounters:   06/22/22 25.08 kg/m²         Physical Exam  Vitals reviewed. Constitutional:       Appearance: Normal appearance. HENT:      Head: Normocephalic and atraumatic. Nose: Nose normal.   Eyes:      Extraocular Movements: Extraocular movements intact. Conjunctiva/sclera: Conjunctivae normal.   Cardiovascular:      Rate and Rhythm: Normal rate and regular rhythm. Heart sounds: No murmur heard. No friction rub. Pulmonary:      Effort: Pulmonary effort is normal. No respiratory distress. Breath sounds: Normal breath sounds. No stridor. No wheezing, rhonchi or rales. Abdominal:      General: There is no distension. Palpations: There is no mass. Tenderness: There is no abdominal tenderness. There is no guarding or rebound. Musculoskeletal:      Cervical back: Normal range of motion and neck supple. Neurological:      Mental Status: She is alert and oriented to person, place, and time. This note was generated used a voice recognition software. Errors in voice recognition may have occurred. An electronic signature was used to authenticate this note.     --Liana Burleson MD

## 2022-06-22 NOTE — TELEPHONE ENCOUNTER
Patient called in advising she is not able to get seen until August for initial appt with Gastro.  Wants to see if the office can get her an appt sooner with [facility] or refer her to someone that can see her sooner  Please advise  Thank you

## 2022-06-24 LAB — IGE: 192 KU/L

## 2022-06-25 LAB — ANA IGG, ELISA: NORMAL

## 2022-07-08 ENCOUNTER — OFFICE VISIT (OUTPATIENT)
Dept: GASTROENTEROLOGY | Facility: CLINIC | Age: 72
End: 2022-07-08

## 2022-07-08 VITALS
WEIGHT: 167 LBS | BODY MASS INDEX: 23.91 KG/M2 | OXYGEN SATURATION: 98 % | TEMPERATURE: 96.8 F | SYSTOLIC BLOOD PRESSURE: 122 MMHG | HEART RATE: 79 BPM | DIASTOLIC BLOOD PRESSURE: 70 MMHG | HEIGHT: 70 IN

## 2022-07-08 DIAGNOSIS — R13.19 ESOPHAGEAL DYSPHAGIA: Primary | ICD-10-CM

## 2022-07-08 DIAGNOSIS — R05.9 COUGH: ICD-10-CM

## 2022-07-08 PROCEDURE — 99214 OFFICE O/P EST MOD 30 MIN: CPT | Performed by: CLINICAL NURSE SPECIALIST

## 2022-07-08 RX ORDER — PANTOPRAZOLE SODIUM 40 MG/1
40 TABLET, DELAYED RELEASE ORAL 2 TIMES DAILY
Status: ON HOLD | COMMUNITY
End: 2022-07-14 | Stop reason: SDUPTHER

## 2022-07-08 RX ORDER — MONTELUKAST SODIUM 10 MG/1
10 TABLET ORAL NIGHTLY
COMMUNITY
End: 2023-03-08

## 2022-07-08 RX ORDER — CITALOPRAM 20 MG/1
20 TABLET ORAL DAILY
COMMUNITY
End: 2022-11-14

## 2022-07-08 NOTE — H&P (VIEW-ONLY)
Earline Salazar  1950    7/8/2022  Chief Complaint   Patient presents with   • Difficulty Swallowing     C/o food getting stuck, chronic cough     Subjective   HPI  Earline Salazar is a 72 y.o. female who presents with a complaint of food getting stuck in her esophageal region. It is recurrent for her mild to moderate depends on what she eats. Worse with cabbage, pasta, and raw vegetable. No nausea or vomiting. No wt loss. She does have a persistent cough that she Is working with her pulmonary doctor on as well. He feels that this is related to reflux. He put her on Protonix 40 mg BID and this has not helped her cough. She is on an allergy medication as well.   No melena. No BRBPR. She is up to date with her colonoscopy evaluations as well.      Past Medical History:   Diagnosis Date   • Allergic rhinitis    • Arthralgia    • Arthritis    • Family history of colonic polyps    • GERD (gastroesophageal reflux disease)    • Hammer toe    • History of adenomatous polyp of colon    • History of colon polyps    • Hyperlipidemia    • Nail avulsion, toe    • Schatzki's ring    • Sleep apnea     c-pap,    • Swelling    • UTI (urinary tract infection)    • Vaginal bleeding      Past Surgical History:   Procedure Laterality Date   • APPENDECTOMY     • COLONOSCOPY  03/10/2016    Two 4-6mm tubular adenomatous polyps in the descending colon and in the transverse colon; The examination was otherwise normal on direct and retroflexion views; Repeat 5 years   • COLONOSCOPY  03/15/2012    One 2-3mm mixed tubulo-hyperplastic polyp in the cecum; One 6mm mixed tubulo-hyperplastic polyp in the hepatic flexure; One 6mm tubular adenomatous polyp in the transverse colon; One 6mm mixed tubulo-hyperplastic polyp in the descending colon; One 6mm hyperplastic polyp in the sigmoid colon; Fiver less than 6mm hyperplastic polyps in the rectum; Repeat 4 years   • COLONOSCOPY  02/05/2007    Diverticulosis; One 6-7mm hyperplastic polyp in the  ascending colon; Repeat 5 years   • COLONOSCOPY N/A 4/29/2021    Procedure: COLONOSCOPY WITH ANESTHESIA;  Surgeon: Xochitl Reyes MD;  Location: [facility] ENDOSCOPY;  Service: Gastroenterology;  Laterality: N/A;  pre: hx polyps  post: polyps  Lucy Art DO   • ENDOSCOPY N/A 5/12/2017    Non-obstructing Schatzki ring-dilated; Normal stomach; Normal examined duodenum; No specimens collected   • ENDOSCOPY  03/10/2016    Small HH; Low grade of narrowing Schatzki ring-dilated; Normal stomach; Normal examined duodenum; No specimens collected   • ENDOSCOPY  03/15/2012    HH; Schatzki ring-dilated to 19mm   • ENDOSCOPY  08/05/2005    Normal endoscopy; GERD by history    • ENDOSCOPY N/A 4/29/2021    Procedure: ESOPHAGOGASTRODUODENOSCOPY WITH ANESTHESIA;  Surgeon: Xochitl Reyes MD;  Location: [facility] ENDOSCOPY;  Service: Gastroenterology;  Laterality: N/A;  pre: dysphagia  post:hiatal hernia. schatzki's ring. dilated with balloon.  Lucy Art DO   • HAMMER TOE REPAIR Right 11/6/2018    Procedure: HAMMERTOE REPAIR WITH PINNING 2nd DIGIT, CARTIVA IMPLANT;  Surgeon: Pk East DPM;  Location: [facility] OR;  Service: Podiatry   • HYSTERECTOMY     • KNEE ARTHROSCOPY W/ MENISCAL REPAIR Left    • PATENT FORAMEN OVALE CLOSURE  2014   • TOE NAIL AVULSION Right 11/6/2018    Procedure: TOE NAIL AVULSION WITH NAIL BIOPSY- RIGHT FOOT;  Surgeon: Pk East DPM;  Location: [facility] OR;  Service: Podiatry   • TONSILLECTOMY         Outpatient Medications Marked as Taking for the 7/8/22 encounter (Office Visit) with Lucy Cabello APRN   Medication Sig Dispense Refill   • Apoaequorin (Prevagen) 10 MG capsule Take  by mouth.     • Azelastine HCl 137 MCG/SPRAY solution 2 sprays by Each Nare route Daily. 30 mL 11   • buPROPion XL (WELLBUTRIN XL) 300 MG 24 hr tablet Take 300 mg by mouth Every Morning.     • citalopram (CeleXA) 20 MG tablet Take 20 mg by mouth Daily.     • cyclobenzaprine (FLEXERIL) 10 MG  tablet Take 1 tablet by mouth 3 (Three) Times a Day As Needed for Muscle Spasms for up to 12 doses. 12 tablet 0   • fluticasone (FLONASE) 50 MCG/ACT nasal spray 2 sprays into the nostril(s) as directed by provider Daily. 1 bottle 6   • Lifitegrast (XIIDRA OP) Administer 1 drop to both eyes 2 (two) times a day.     • montelukast (SINGULAIR) 10 MG tablet Take 10 mg by mouth Every Night.     • Multiple Vitamins-Minerals (CENTRUM SILVER 50+WOMEN PO) Take 1 tablet by mouth Daily.     • pantoprazole (PROTONIX) 40 MG EC tablet Take 40 mg by mouth 2 (Two) Times a Day.     • pramipexole (MIRAPEX) 1 MG tablet Take 1 mg by mouth 2 (two) times a day. Takes 1/2 tablet at a.m dose     • Specialty Vitamins Products (VITAMINS FOR HAIR PO) Take 1 capsule by mouth Daily. Hair growth support     • traZODone (DESYREL) 50 MG tablet Take 50 mg by mouth As Needed.     • triamcinolone (KENALOG) 0.025 % cream Apply  topically to the appropriate area as directed 2 (Two) Times a Day. 15 g 0   • vitamin D (ERGOCALCIFEROL) 1.25 MG (22957 UT) capsule capsule Take 50,000 Units by mouth 1 (One) Time Per Week.       No Known Allergies  Social History     Socioeconomic History   • Marital status:    Tobacco Use   • Smoking status: Former Smoker     Quit date: 2006     Years since quittin.1   • Smokeless tobacco: Never Used   Vaping Use   • Vaping Use: Never used   Substance and Sexual Activity   • Alcohol use: Yes     Comment: Occasionally    • Drug use: No   • Sexual activity: Defer     Family History   Problem Relation Age of Onset   • Colon polyps Mother         Unknown age    • Heart disease Mother    • Thyroid cancer Sister    • Leukemia Sister    • Rectal cancer Maternal Grandfather         In late 70's/early 80's    • Colon cancer Neg Hx    • Esophageal cancer Neg Hx    • Liver cancer Neg Hx    • Liver disease Neg Hx    • Stomach cancer Neg Hx      Health Maintenance   Topic Date Due   • TDAP/TD VACCINES (1 - Tdap) Never done  "  • Pneumococcal Vaccine 65+ (1 - PCV) Never done   • HEPATITIS C SCREENING  Never done   • ANNUAL WELLNESS VISIT  05/13/2020   • LIPID PANEL  05/13/2020   • ZOSTER VACCINE (2 of 2) 01/10/2021   • COVID-19 Vaccine (4 - Booster for Moderna series) 02/28/2022   • INFLUENZA VACCINE  10/01/2022   • MAMMOGRAM  11/18/2022   • DXA SCAN  11/18/2022   • COLORECTAL CANCER SCREENING  04/29/2024     Review of Systems   Constitutional: Negative for activity change, appetite change, chills, diaphoresis, fatigue, fever and unexpected weight change.   HENT: Positive for trouble swallowing. Negative for ear pain, hearing loss, mouth sores, sore throat and voice change.    Eyes: Negative.    Respiratory: Positive for cough. Negative for choking, shortness of breath and wheezing.    Cardiovascular: Negative for chest pain and palpitations.   Gastrointestinal: Negative for abdominal pain, blood in stool, constipation, diarrhea, nausea and vomiting.   Endocrine: Negative for cold intolerance and heat intolerance.   Genitourinary: Negative for decreased urine volume, dysuria, frequency, hematuria and urgency.   Musculoskeletal: Negative for back pain, gait problem and myalgias.   Skin: Negative for color change, pallor and rash.   Allergic/Immunologic: Negative for food allergies and immunocompromised state.   Neurological: Negative for dizziness, tremors, seizures, syncope, weakness, light-headedness, numbness and headaches.   Hematological: Negative for adenopathy. Does not bruise/bleed easily.   Psychiatric/Behavioral: Negative for agitation and confusion. The patient is not nervous/anxious.    All other systems reviewed and are negative.    Objective   Vitals:    07/08/22 1305   BP: 122/70   Pulse: 79   Temp: 96.8 °F (36 °C)   SpO2: 98%   Weight: 75.8 kg (167 lb)   Height: 176.5 cm (69.5\")     Body mass index is 24.31 kg/m².  Physical Exam  Constitutional:       Appearance: She is well-developed.   HENT:      Head: Normocephalic and " atraumatic.   Eyes:      Pupils: Pupils are equal, round, and reactive to light.   Neck:      Trachea: No tracheal deviation.   Cardiovascular:      Rate and Rhythm: Normal rate and regular rhythm.      Heart sounds: Normal heart sounds. No murmur heard.    No friction rub. No gallop.   Pulmonary:      Effort: Pulmonary effort is normal. No respiratory distress.      Breath sounds: Normal breath sounds. No wheezing or rales.   Chest:      Chest wall: No tenderness.   Abdominal:      General: Bowel sounds are normal. There is no distension.      Palpations: Abdomen is soft. Abdomen is not rigid.      Tenderness: There is no abdominal tenderness. There is no guarding or rebound.   Musculoskeletal:         General: No tenderness or deformity. Normal range of motion.      Cervical back: Normal range of motion and neck supple.   Skin:     General: Skin is warm and dry.      Coloration: Skin is not pale.      Findings: No rash.   Neurological:      Mental Status: She is alert and oriented to person, place, and time.      Deep Tendon Reflexes: Reflexes are normal and symmetric.   Psychiatric:         Behavior: Behavior normal.         Thought Content: Thought content normal.         Judgment: Judgment normal.       Assessment & Plan   Diagnoses and all orders for this visit:    1. Esophageal dysphagia (Primary)  -     Case Request; Standing  -     COVID PRE-OP / PRE-PROCEDURE SCREENING ORDER (NO ISOLATION) - Swab, Nasal Cavity; Future    2. Cough    Other orders  -     Follow Anesthesia Guidelines / Standing Orders; Future  -     Obtain Informed Consent; Future    I discussed non pharmaceutical treatment of gerd.  This includes gradually losing weight to achieve ideal body wt., elevation of the head of bed by 4-6 inches, nothing to eat or drink 3 hours prior to lying down, avoiding tight clothing, stress reduction, tobacco cessation, reduction of alcohol intake, and dietary restrictions (avoiding caffeine, coffee, fatty  foods, mints, chocolate, spicy foods and tomato based sauces as much as possible).  I reviewed notes and CTA from I[facility]ha today and discussed that her cough is less likely induced by reflux alone and encouraged continued follow up.   Continue PPI therapy  ESOPHAGOGASTRODUODENOSCOPY WITH ANESTHESIA (N/A)  Part of this note may be an electronic transcription/translation of spoken language to printed text using the Dragon Dictation System.  Body mass index is 24.31 kg/m².  No follow-ups on file.    BMI is within normal parameters. No other follow-up for BMI required.      All risks, benefits, alternatives, and indications of colonoscopy and/or Endoscopy procedure have been discussed with the patient. Risks to include perforation of the colon requiring possible surgery or colostomy, risk of bleeding from biopsies or removal of colon tissue, possibility of missing a colon polyp or cancer, or adverse drug reaction.  Benefits to include the diagnosis and management of disease of the colon and rectum. Alternatives to include barium enema, radiographic evaluation, lab testing or no intervention. Pt verbalizes understanding and agrees.     Lucy Cabello, APRN  7/8/2022  13:41 CDT          If you smoke or use tobacco, 4 minutes reading provided  Steps to Quit Smoking  Smoking tobacco can be harmful to your health and can affect almost every organ in your body. Smoking puts you, and those around you, at risk for developing many serious chronic diseases. Quitting smoking is difficult, but it is one of the best things that you can do for your health. It is never too late to quit.  What are the benefits of quitting smoking?  When you quit smoking, you lower your risk of developing serious diseases and conditions, such as:  · Lung cancer or lung disease, such as COPD.  · Heart disease.  · Stroke.  · Heart attack.  · Infertility.  · Osteoporosis and bone fractures.  Additionally, symptoms such as coughing, wheezing, and  shortness of breath may get better when you quit. You may also find that you get sick less often because your body is stronger at fighting off colds and infections. If you are pregnant, quitting smoking can help to reduce your chances of having a baby of low birth weight.  How do I get ready to quit?  When you decide to quit smoking, create a plan to make sure that you are successful. Before you quit:  · Pick a date to quit. Set a date within the next two weeks to give you time to prepare.  · Write down the reasons why you are quitting. Keep this list in places where you will see it often, such as on your bathroom mirror or in your car or wallet.  · Identify the people, places, things, and activities that make you want to smoke (triggers) and avoid them. Make sure to take these actions:  ¨ Throw away all cigarettes at home, at work, and in your car.  ¨ Throw away smoking accessories, such as ashtrays and lighters.  ¨ Clean your car and make sure to empty the ashtray.  ¨ Clean your home, including curtains and carpets.  · Tell your family, friends, and coworkers that you are quitting. Support from your loved ones can make quitting easier.  · Talk with your health care provider about your options for quitting smoking.  · Find out what treatment options are covered by your health insurance.  What strategies can I use to quit smoking?  Talk with your healthcare provider about different strategies to quit smoking. Some strategies include:  · Quitting smoking altogether instead of gradually lessening how much you smoke over a period of time. Research shows that quitting “cold turkey” is more successful than gradually quitting.  · Attending in-person counseling to help you build problem-solving skills. You are more likely to have success in quitting if you attend several counseling sessions. Even short sessions of 10 minutes can be effective.  · Finding resources and support systems that can help you to quit smoking and  remain smoke-free after you quit. These resources are most helpful when you use them often. They can include:  ¨ Online chats with a counselor.  ¨ Telephone quitlines.  ¨ Printed self-help materials.  ¨ Support groups or group counseling.  ¨ Text messaging programs.  ¨ Mobile phone applications.  · Taking medicines to help you quit smoking. (If you are pregnant or breastfeeding, talk with your health care provider first.) Some medicines contain nicotine and some do not. Both types of medicines help with cravings, but the medicines that include nicotine help to relieve withdrawal symptoms. Your health care provider may recommend:  ¨ Nicotine patches, gum, or lozenges.  ¨ Nicotine inhalers or sprays.  ¨ Non-nicotine medicine that is taken by mouth.  Talk with your health care provider about combining strategies, such as taking medicines while you are also receiving in-person counseling. Using these two strategies together makes you more likely to succeed in quitting than if you used either strategy on its own.  If you are pregnant or breastfeeding, talk with your health care provider about finding counseling or other support strategies to quit smoking. Do not take medicine to help you quit smoking unless told to do so by your health care provider.  What things can I do to make it easier to quit?  Quitting smoking might feel overwhelming at first, but there is a lot that you can do to make it easier. Take these important actions:  · Reach out to your family and friends and ask that they support and encourage you during this time. Call telephone quitlines, reach out to support groups, or work with a counselor for support.  · Ask people who smoke to avoid smoking around you.  · Avoid places that trigger you to smoke, such as bars, parties, or smoke-break areas at work.  · Spend time around people who do not smoke.  · Lessen stress in your life, because stress can be a smoking trigger for some people. To lessen stress,  try:  ¨ Exercising regularly.  ¨ Deep-breathing exercises.  ¨ Yoga.  ¨ Meditating.  ¨ Performing a body scan. This involves closing your eyes, scanning your body from head to toe, and noticing which parts of your body are particularly tense. Purposefully relax the muscles in those areas.  · Download or purchase mobile phone or tablet apps (applications) that can help you stick to your quit plan by providing reminders, tips, and encouragement. There are many free apps, such as QuitGuide from the CDC (Centers for Disease Control and Prevention). You can find other support for quitting smoking (smoking cessation) through smokefree.gov and other websites.  How will I feel when I quit smoking?  Within the first 24 hours of quitting smoking, you may start to feel some withdrawal symptoms. These symptoms are usually most noticeable 2-3 days after quitting, but they usually do not last beyond 2-3 weeks. Changes or symptoms that you might experience include:  · Mood swings.  · Restlessness, anxiety, or irritation.  · Difficulty concentrating.  · Dizziness.  · Strong cravings for sugary foods in addition to nicotine.  · Mild weight gain.  · Constipation.  · Nausea.  · Coughing or a sore throat.  · Changes in how your medicines work in your body.  · A depressed mood.  · Difficulty sleeping (insomnia).  After the first 2-3 weeks of quitting, you may start to notice more positive results, such as:  · Improved sense of smell and taste.  · Decreased coughing and sore throat.  · Slower heart rate.  · Lower blood pressure.  · Clearer skin.  · The ability to breathe more easily.  · Fewer sick days.  Quitting smoking is very challenging for most people. Do not get discouraged if you are not successful the first time. Some people need to make many attempts to quit before they achieve long-term success. Do your best to stick to your quit plan, and talk with your health care provider if you have any questions or concerns.  This  information is not intended to replace advice given to you by your health care provider. Make sure you discuss any questions you have with your health care provider.  Document Released: 12/12/2002 Document Revised: 08/15/2017 Document Reviewed: 05/03/2016  [employer] Interactive Patient Education © 2017 [employer]

## 2022-07-08 NOTE — PROGRESS NOTES
Earline Salazar  1950    7/8/2022  Chief Complaint   Patient presents with   • Difficulty Swallowing     C/o food getting stuck, chronic cough     Subjective   HPI  Earline Salazar is a 72 y.o. female who presents with a complaint of food getting stuck in her esophageal region. It is recurrent for her mild to moderate depends on what she eats. Worse with cabbage, pasta, and raw vegetable. No nausea or vomiting. No wt loss. She does have a persistent cough that she Is working with her pulmonary doctor on as well. He feels that this is related to reflux. He put her on Protonix 40 mg BID and this has not helped her cough. She is on an allergy medication as well.   No melena. No BRBPR. She is up to date with her colonoscopy evaluations as well.      Past Medical History:   Diagnosis Date   • Allergic rhinitis    • Arthralgia    • Arthritis    • Family history of colonic polyps    • GERD (gastroesophageal reflux disease)    • Hammer toe    • History of adenomatous polyp of colon    • History of colon polyps    • Hyperlipidemia    • Nail avulsion, toe    • Schatzki's ring    • Sleep apnea     c-pap,    • Swelling    • UTI (urinary tract infection)    • Vaginal bleeding      Past Surgical History:   Procedure Laterality Date   • APPENDECTOMY     • COLONOSCOPY  03/10/2016    Two 4-6mm tubular adenomatous polyps in the descending colon and in the transverse colon; The examination was otherwise normal on direct and retroflexion views; Repeat 5 years   • COLONOSCOPY  03/15/2012    One 2-3mm mixed tubulo-hyperplastic polyp in the cecum; One 6mm mixed tubulo-hyperplastic polyp in the hepatic flexure; One 6mm tubular adenomatous polyp in the transverse colon; One 6mm mixed tubulo-hyperplastic polyp in the descending colon; One 6mm hyperplastic polyp in the sigmoid colon; Fiver less than 6mm hyperplastic polyps in the rectum; Repeat 4 years   • COLONOSCOPY  02/05/2007    Diverticulosis; One 6-7mm hyperplastic polyp in the  ascending colon; Repeat 5 years   • COLONOSCOPY N/A 4/29/2021    Procedure: COLONOSCOPY WITH ANESTHESIA;  Surgeon: Xochitl Reyes MD;  Location: [facility] ENDOSCOPY;  Service: Gastroenterology;  Laterality: N/A;  pre: hx polyps  post: polyps  Lucy Art DO   • ENDOSCOPY N/A 5/12/2017    Non-obstructing Schatzki ring-dilated; Normal stomach; Normal examined duodenum; No specimens collected   • ENDOSCOPY  03/10/2016    Small HH; Low grade of narrowing Schatzki ring-dilated; Normal stomach; Normal examined duodenum; No specimens collected   • ENDOSCOPY  03/15/2012    HH; Schatzki ring-dilated to 19mm   • ENDOSCOPY  08/05/2005    Normal endoscopy; GERD by history    • ENDOSCOPY N/A 4/29/2021    Procedure: ESOPHAGOGASTRODUODENOSCOPY WITH ANESTHESIA;  Surgeon: Xochitl Reyes MD;  Location: [facility] ENDOSCOPY;  Service: Gastroenterology;  Laterality: N/A;  pre: dysphagia  post:hiatal hernia. schatzki's ring. dilated with balloon.  Lucy Art DO   • HAMMER TOE REPAIR Right 11/6/2018    Procedure: HAMMERTOE REPAIR WITH PINNING 2nd DIGIT, CARTIVA IMPLANT;  Surgeon: Pk East DPM;  Location: [facility] OR;  Service: Podiatry   • HYSTERECTOMY     • KNEE ARTHROSCOPY W/ MENISCAL REPAIR Left    • PATENT FORAMEN OVALE CLOSURE  2014   • TOE NAIL AVULSION Right 11/6/2018    Procedure: TOE NAIL AVULSION WITH NAIL BIOPSY- RIGHT FOOT;  Surgeon: Pk East DPM;  Location: [facility] OR;  Service: Podiatry   • TONSILLECTOMY         Outpatient Medications Marked as Taking for the 7/8/22 encounter (Office Visit) with Lucy Cabello APRN   Medication Sig Dispense Refill   • Apoaequorin (Prevagen) 10 MG capsule Take  by mouth.     • Azelastine HCl 137 MCG/SPRAY solution 2 sprays by Each Nare route Daily. 30 mL 11   • buPROPion XL (WELLBUTRIN XL) 300 MG 24 hr tablet Take 300 mg by mouth Every Morning.     • citalopram (CeleXA) 20 MG tablet Take 20 mg by mouth Daily.     • cyclobenzaprine (FLEXERIL) 10 MG  tablet Take 1 tablet by mouth 3 (Three) Times a Day As Needed for Muscle Spasms for up to 12 doses. 12 tablet 0   • fluticasone (FLONASE) 50 MCG/ACT nasal spray 2 sprays into the nostril(s) as directed by provider Daily. 1 bottle 6   • Lifitegrast (XIIDRA OP) Administer 1 drop to both eyes 2 (two) times a day.     • montelukast (SINGULAIR) 10 MG tablet Take 10 mg by mouth Every Night.     • Multiple Vitamins-Minerals (CENTRUM SILVER 50+WOMEN PO) Take 1 tablet by mouth Daily.     • pantoprazole (PROTONIX) 40 MG EC tablet Take 40 mg by mouth 2 (Two) Times a Day.     • pramipexole (MIRAPEX) 1 MG tablet Take 1 mg by mouth 2 (two) times a day. Takes 1/2 tablet at a.m dose     • Specialty Vitamins Products (VITAMINS FOR HAIR PO) Take 1 capsule by mouth Daily. Hair growth support     • traZODone (DESYREL) 50 MG tablet Take 50 mg by mouth As Needed.     • triamcinolone (KENALOG) 0.025 % cream Apply  topically to the appropriate area as directed 2 (Two) Times a Day. 15 g 0   • vitamin D (ERGOCALCIFEROL) 1.25 MG (78659 UT) capsule capsule Take 50,000 Units by mouth 1 (One) Time Per Week.       No Known Allergies  Social History     Socioeconomic History   • Marital status:    Tobacco Use   • Smoking status: Former Smoker     Quit date: 2006     Years since quittin.1   • Smokeless tobacco: Never Used   Vaping Use   • Vaping Use: Never used   Substance and Sexual Activity   • Alcohol use: Yes     Comment: Occasionally    • Drug use: No   • Sexual activity: Defer     Family History   Problem Relation Age of Onset   • Colon polyps Mother         Unknown age    • Heart disease Mother    • Thyroid cancer Sister    • Leukemia Sister    • Rectal cancer Maternal Grandfather         In late 70's/early 80's    • Colon cancer Neg Hx    • Esophageal cancer Neg Hx    • Liver cancer Neg Hx    • Liver disease Neg Hx    • Stomach cancer Neg Hx      Health Maintenance   Topic Date Due   • TDAP/TD VACCINES (1 - Tdap) Never done  "  • Pneumococcal Vaccine 65+ (1 - PCV) Never done   • HEPATITIS C SCREENING  Never done   • ANNUAL WELLNESS VISIT  05/13/2020   • LIPID PANEL  05/13/2020   • ZOSTER VACCINE (2 of 2) 01/10/2021   • COVID-19 Vaccine (4 - Booster for Moderna series) 02/28/2022   • INFLUENZA VACCINE  10/01/2022   • MAMMOGRAM  11/18/2022   • DXA SCAN  11/18/2022   • COLORECTAL CANCER SCREENING  04/29/2024     Review of Systems   Constitutional: Negative for activity change, appetite change, chills, diaphoresis, fatigue, fever and unexpected weight change.   HENT: Positive for trouble swallowing. Negative for ear pain, hearing loss, mouth sores, sore throat and voice change.    Eyes: Negative.    Respiratory: Positive for cough. Negative for choking, shortness of breath and wheezing.    Cardiovascular: Negative for chest pain and palpitations.   Gastrointestinal: Negative for abdominal pain, blood in stool, constipation, diarrhea, nausea and vomiting.   Endocrine: Negative for cold intolerance and heat intolerance.   Genitourinary: Negative for decreased urine volume, dysuria, frequency, hematuria and urgency.   Musculoskeletal: Negative for back pain, gait problem and myalgias.   Skin: Negative for color change, pallor and rash.   Allergic/Immunologic: Negative for food allergies and immunocompromised state.   Neurological: Negative for dizziness, tremors, seizures, syncope, weakness, light-headedness, numbness and headaches.   Hematological: Negative for adenopathy. Does not bruise/bleed easily.   Psychiatric/Behavioral: Negative for agitation and confusion. The patient is not nervous/anxious.    All other systems reviewed and are negative.    Objective   Vitals:    07/08/22 1305   BP: 122/70   Pulse: 79   Temp: 96.8 °F (36 °C)   SpO2: 98%   Weight: 75.8 kg (167 lb)   Height: 176.5 cm (69.5\")     Body mass index is 24.31 kg/m².  Physical Exam  Constitutional:       Appearance: She is well-developed.   HENT:      Head: Normocephalic and " atraumatic.   Eyes:      Pupils: Pupils are equal, round, and reactive to light.   Neck:      Trachea: No tracheal deviation.   Cardiovascular:      Rate and Rhythm: Normal rate and regular rhythm.      Heart sounds: Normal heart sounds. No murmur heard.    No friction rub. No gallop.   Pulmonary:      Effort: Pulmonary effort is normal. No respiratory distress.      Breath sounds: Normal breath sounds. No wheezing or rales.   Chest:      Chest wall: No tenderness.   Abdominal:      General: Bowel sounds are normal. There is no distension.      Palpations: Abdomen is soft. Abdomen is not rigid.      Tenderness: There is no abdominal tenderness. There is no guarding or rebound.   Musculoskeletal:         General: No tenderness or deformity. Normal range of motion.      Cervical back: Normal range of motion and neck supple.   Skin:     General: Skin is warm and dry.      Coloration: Skin is not pale.      Findings: No rash.   Neurological:      Mental Status: She is alert and oriented to person, place, and time.      Deep Tendon Reflexes: Reflexes are normal and symmetric.   Psychiatric:         Behavior: Behavior normal.         Thought Content: Thought content normal.         Judgment: Judgment normal.       Assessment & Plan   Diagnoses and all orders for this visit:    1. Esophageal dysphagia (Primary)  -     Case Request; Standing  -     COVID PRE-OP / PRE-PROCEDURE SCREENING ORDER (NO ISOLATION) - Swab, Nasal Cavity; Future    2. Cough    Other orders  -     Follow Anesthesia Guidelines / Standing Orders; Future  -     Obtain Informed Consent; Future    I discussed non pharmaceutical treatment of gerd.  This includes gradually losing weight to achieve ideal body wt., elevation of the head of bed by 4-6 inches, nothing to eat or drink 3 hours prior to lying down, avoiding tight clothing, stress reduction, tobacco cessation, reduction of alcohol intake, and dietary restrictions (avoiding caffeine, coffee, fatty  foods, mints, chocolate, spicy foods and tomato based sauces as much as possible).  I reviewed notes and CTA from [facility] today and discussed that her cough is less likely induced by reflux alone and encouraged continued follow up.   Continue PPI therapy  ESOPHAGOGASTRODUODENOSCOPY WITH ANESTHESIA (N/A)  Part of this note may be an electronic transcription/translation of spoken language to printed text using the Dragon Dictation System.  Body mass index is 24.31 kg/m².  No follow-ups on file.    BMI is within normal parameters. No other follow-up for BMI required.      All risks, benefits, alternatives, and indications of colonoscopy and/or Endoscopy procedure have been discussed with the patient. Risks to include perforation of the colon requiring possible surgery or colostomy, risk of bleeding from biopsies or removal of colon tissue, possibility of missing a colon polyp or cancer, or adverse drug reaction.  Benefits to include the diagnosis and management of disease of the colon and rectum. Alternatives to include barium enema, radiographic evaluation, lab testing or no intervention. Pt verbalizes understanding and agrees.     Lucy Cabello, APRN  7/8/2022  13:41 CDT          If you smoke or use tobacco, 4 minutes reading provided  Steps to Quit Smoking  Smoking tobacco can be harmful to your health and can affect almost every organ in your body. Smoking puts you, and those around you, at risk for developing many serious chronic diseases. Quitting smoking is difficult, but it is one of the best things that you can do for your health. It is never too late to quit.  What are the benefits of quitting smoking?  When you quit smoking, you lower your risk of developing serious diseases and conditions, such as:  · Lung cancer or lung disease, such as COPD.  · Heart disease.  · Stroke.  · Heart attack.  · Infertility.  · Osteoporosis and bone fractures.  Additionally, symptoms such as coughing, wheezing, and  shortness of breath may get better when you quit. You may also find that you get sick less often because your body is stronger at fighting off colds and infections. If you are pregnant, quitting smoking can help to reduce your chances of having a baby of low birth weight.  How do I get ready to quit?  When you decide to quit smoking, create a plan to make sure that you are successful. Before you quit:  · Pick a date to quit. Set a date within the next two weeks to give you time to prepare.  · Write down the reasons why you are quitting. Keep this list in places where you will see it often, such as on your bathroom mirror or in your car or wallet.  · Identify the people, places, things, and activities that make you want to smoke (triggers) and avoid them. Make sure to take these actions:  ¨ Throw away all cigarettes at home, at work, and in your car.  ¨ Throw away smoking accessories, such as ashtrays and lighters.  ¨ Clean your car and make sure to empty the ashtray.  ¨ Clean your home, including curtains and carpets.  · Tell your family, friends, and coworkers that you are quitting. Support from your loved ones can make quitting easier.  · Talk with your health care provider about your options for quitting smoking.  · Find out what treatment options are covered by your health insurance.  What strategies can I use to quit smoking?  Talk with your healthcare provider about different strategies to quit smoking. Some strategies include:  · Quitting smoking altogether instead of gradually lessening how much you smoke over a period of time. Research shows that quitting “cold turkey” is more successful than gradually quitting.  · Attending in-person counseling to help you build problem-solving skills. You are more likely to have success in quitting if you attend several counseling sessions. Even short sessions of 10 minutes can be effective.  · Finding resources and support systems that can help you to quit smoking and  remain smoke-free after you quit. These resources are most helpful when you use them often. They can include:  ¨ Online chats with a counselor.  ¨ Telephone quitlines.  ¨ Printed self-help materials.  ¨ Support groups or group counseling.  ¨ Text messaging programs.  ¨ Mobile phone applications.  · Taking medicines to help you quit smoking. (If you are pregnant or breastfeeding, talk with your health care provider first.) Some medicines contain nicotine and some do not. Both types of medicines help with cravings, but the medicines that include nicotine help to relieve withdrawal symptoms. Your health care provider may recommend:  ¨ Nicotine patches, gum, or lozenges.  ¨ Nicotine inhalers or sprays.  ¨ Non-nicotine medicine that is taken by mouth.  Talk with your health care provider about combining strategies, such as taking medicines while you are also receiving in-person counseling. Using these two strategies together makes you more likely to succeed in quitting than if you used either strategy on its own.  If you are pregnant or breastfeeding, talk with your health care provider about finding counseling or other support strategies to quit smoking. Do not take medicine to help you quit smoking unless told to do so by your health care provider.  What things can I do to make it easier to quit?  Quitting smoking might feel overwhelming at first, but there is a lot that you can do to make it easier. Take these important actions:  · Reach out to your family and friends and ask that they support and encourage you during this time. Call telephone quitlines, reach out to support groups, or work with a counselor for support.  · Ask people who smoke to avoid smoking around you.  · Avoid places that trigger you to smoke, such as bars, parties, or smoke-break areas at work.  · Spend time around people who do not smoke.  · Lessen stress in your life, because stress can be a smoking trigger for some people. To lessen stress,  try:  ¨ Exercising regularly.  ¨ Deep-breathing exercises.  ¨ Yoga.  ¨ Meditating.  ¨ Performing a body scan. This involves closing your eyes, scanning your body from head to toe, and noticing which parts of your body are particularly tense. Purposefully relax the muscles in those areas.  · Download or purchase mobile phone or tablet apps (applications) that can help you stick to your quit plan by providing reminders, tips, and encouragement. There are many free apps, such as QuitGuide from the CDC (Centers for Disease Control and Prevention). You can find other support for quitting smoking (smoking cessation) through smokefree.gov and other websites.  How will I feel when I quit smoking?  Within the first 24 hours of quitting smoking, you may start to feel some withdrawal symptoms. These symptoms are usually most noticeable 2-3 days after quitting, but they usually do not last beyond 2-3 weeks. Changes or symptoms that you might experience include:  · Mood swings.  · Restlessness, anxiety, or irritation.  · Difficulty concentrating.  · Dizziness.  · Strong cravings for sugary foods in addition to nicotine.  · Mild weight gain.  · Constipation.  · Nausea.  · Coughing or a sore throat.  · Changes in how your medicines work in your body.  · A depressed mood.  · Difficulty sleeping (insomnia).  After the first 2-3 weeks of quitting, you may start to notice more positive results, such as:  · Improved sense of smell and taste.  · Decreased coughing and sore throat.  · Slower heart rate.  · Lower blood pressure.  · Clearer skin.  · The ability to breathe more easily.  · Fewer sick days.  Quitting smoking is very challenging for most people. Do not get discouraged if you are not successful the first time. Some people need to make many attempts to quit before they achieve long-term success. Do your best to stick to your quit plan, and talk with your health care provider if you have any questions or concerns.  This  information is not intended to replace advice given to you by your health care provider. Make sure you discuss any questions you have with your health care provider.  Document Released: 12/12/2002 Document Revised: 08/15/2017 Document Reviewed: 05/03/2016  [employer] Interactive Patient Education © 2017 [employer]

## 2022-07-12 ENCOUNTER — APPOINTMENT (OUTPATIENT)
Dept: LAB | Facility: HOSPITAL | Age: 72
End: 2022-07-12

## 2022-07-12 ENCOUNTER — OFFICE VISIT (OUTPATIENT)
Dept: PULMONOLOGY | Age: 72
End: 2022-07-12
Payer: MEDICARE

## 2022-07-12 VITALS
DIASTOLIC BLOOD PRESSURE: 70 MMHG | BODY MASS INDEX: 24.85 KG/M2 | SYSTOLIC BLOOD PRESSURE: 122 MMHG | WEIGHT: 167.8 LBS | HEIGHT: 69 IN | OXYGEN SATURATION: 99 % | HEART RATE: 77 BPM

## 2022-07-12 DIAGNOSIS — R05.3 CHRONIC COUGH: Primary | ICD-10-CM

## 2022-07-12 DIAGNOSIS — Z77.22 SECOND HAND SMOKE EXPOSURE: ICD-10-CM

## 2022-07-12 DIAGNOSIS — R94.2 DIFFUSION CAPACITY OF LUNG (DL), DECREASED: ICD-10-CM

## 2022-07-12 DIAGNOSIS — K21.9 GASTROESOPHAGEAL REFLUX DISEASE WITHOUT ESOPHAGITIS: ICD-10-CM

## 2022-07-12 PROCEDURE — G8427 DOCREV CUR MEDS BY ELIG CLIN: HCPCS | Performed by: INTERNAL MEDICINE

## 2022-07-12 PROCEDURE — G8420 CALC BMI NORM PARAMETERS: HCPCS | Performed by: INTERNAL MEDICINE

## 2022-07-12 PROCEDURE — 99214 OFFICE O/P EST MOD 30 MIN: CPT | Performed by: INTERNAL MEDICINE

## 2022-07-12 PROCEDURE — 1036F TOBACCO NON-USER: CPT | Performed by: INTERNAL MEDICINE

## 2022-07-12 PROCEDURE — G8399 PT W/DXA RESULTS DOCUMENT: HCPCS | Performed by: INTERNAL MEDICINE

## 2022-07-12 PROCEDURE — 1123F ACP DISCUSS/DSCN MKR DOCD: CPT | Performed by: INTERNAL MEDICINE

## 2022-07-12 PROCEDURE — 3017F COLORECTAL CA SCREEN DOC REV: CPT | Performed by: INTERNAL MEDICINE

## 2022-07-12 PROCEDURE — 1090F PRES/ABSN URINE INCON ASSESS: CPT | Performed by: INTERNAL MEDICINE

## 2022-07-12 ASSESSMENT — ENCOUNTER SYMPTOMS
BACK PAIN: 0
ABDOMINAL DISTENTION: 0
RHINORRHEA: 0
APNEA: 0
ABDOMINAL PAIN: 0
WHEEZING: 0
ANAL BLEEDING: 0
COUGH: 1
SHORTNESS OF BREATH: 1
CHEST TIGHTNESS: 0

## 2022-07-12 NOTE — PROGRESS NOTES
Pulmonary and Sleep Medicine    Giselle Marsh (:  1950) is a 71 y.o. female,Established patient, here for evaluation of the following chief complaint(s):  Follow-up (Pt came in for results of CTA and lab work.)      Referring physician:  No referring provider defined for this encounter. ASSESSMENT/PLAN:  1. Chronic cough  2. Second hand smoke exposure  3. Diffusion capacity of lung (dl), decreased  4. Gastroesophageal reflux disease without esophagitis        The patient reports that her cough is improved with augmentation of her proton pump inhibitor. We will reevaluate after her upper endoscopy. Continue current management for the chronic cough. No evidence of autoimmune or connective tissue disorders. No evidence of common variable immune deficiency. Lina Quintana MD, [facility], DAB    Return in about 4 weeks (around 2022). SUBJECTIVE/OBJECTIVE:  We willThe patient is here for follow-up on chronic cough. Her autoimmune work-up was abnormal.  Her immune deficiency work-up was negative. She had a CT pulmonary angiogram that showed no evidence of PE. She continues to have distended esophagus on CT imaging. She was evaluated by gastroenterology at [facility].  She is scheduled to have an upper endoscopy. Prior to Visit Medications    Medication Sig Taking?  Authorizing Provider   citalopram (CELEXA) 20 MG tablet TAKE 1 TABLET BY MOUTH ONCE DAILY Yes Historical Provider, MD   ergocalciferol (ERGOCALCIFEROL) 1.25 MG (65898 UT) capsule Take 50,000 Units by mouth once a week Yes Historical Provider, MD   pantoprazole (PROTONIX) 40 MG tablet TAKE 1 TABLET BY MOUTH ONCE DAILY FOR REFLUX Yes Historical Provider, MD   montelukast (SINGULAIR) 10 MG tablet TAKE 1 TABLET BY MOUTH ONCE DAILY FOR ALLERGIES Yes Historical Provider, MD   traZODone (DESYREL) 50 MG tablet TAKE 1 TABLET BY MOUTH AT BEDTIME AS NEEDED Yes Historical Provider, MD   triamcinolone (KENALOG) 0.025 % cream Negative for environmental allergies. Neurological: Negative for dizziness, facial asymmetry, light-headedness and headaches. Vitals:    07/12/22 1316   BP: 122/70   Pulse: 77   SpO2: 99%     BMI Readings from Last 1 Encounters:   07/12/22 24.78 kg/m²     [unfilled]     Physical Exam  Vitals reviewed. Constitutional:       Appearance: Normal appearance. HENT:      Head: Normocephalic and atraumatic. Nose: Nose normal.   Eyes:      Extraocular Movements: Extraocular movements intact. Conjunctiva/sclera: Conjunctivae normal.   Cardiovascular:      Rate and Rhythm: Normal rate and regular rhythm. Heart sounds: No murmur heard. No friction rub. Pulmonary:      Effort: Pulmonary effort is normal. No respiratory distress. Breath sounds: Normal breath sounds. No stridor. No wheezing, rhonchi or rales. Abdominal:      General: There is no distension. Palpations: There is no mass. Tenderness: There is no abdominal tenderness. There is no guarding or rebound. Musculoskeletal:      Cervical back: Normal range of motion and neck supple. Neurological:      Mental Status: She is alert and oriented to person, place, and time. This note was generated used a voice recognition software. Errors in voice recognition may have occurred. An electronic signature was used to authenticate this note.     --Marcella Fernandez MD

## 2022-07-13 ENCOUNTER — LAB (OUTPATIENT)
Dept: LAB | Facility: HOSPITAL | Age: 72
End: 2022-07-13

## 2022-07-13 DIAGNOSIS — R13.19 ESOPHAGEAL DYSPHAGIA: ICD-10-CM

## 2022-07-13 LAB — SARS-COV-2 ORF1AB RESP QL NAA+PROBE: NOT DETECTED

## 2022-07-13 PROCEDURE — U0004 COV-19 TEST NON-CDC HGH THRU: HCPCS

## 2022-07-13 PROCEDURE — U0005 INFEC AGEN DETEC AMPLI PROBE: HCPCS

## 2022-07-13 PROCEDURE — C9803 HOPD COVID-19 SPEC COLLECT: HCPCS

## 2022-07-14 ENCOUNTER — HOSPITAL ENCOUNTER (OUTPATIENT)
Facility: HOSPITAL | Age: 72
Setting detail: HOSPITAL OUTPATIENT SURGERY
Discharge: HOME | End: 2022-07-14
Attending: INTERNAL MEDICINE | Admitting: INTERNAL MEDICINE

## 2022-07-14 ENCOUNTER — OFFICE VISIT (OUTPATIENT)
Age: 72
End: 2022-07-14
Payer: MEDICARE

## 2022-07-14 ENCOUNTER — ANESTHESIA EVENT (OUTPATIENT)
Dept: GASTROENTEROLOGY | Facility: HOSPITAL | Age: 72
End: 2022-07-14

## 2022-07-14 ENCOUNTER — ANESTHESIA (OUTPATIENT)
Dept: GASTROENTEROLOGY | Facility: HOSPITAL | Age: 72
End: 2022-07-14

## 2022-07-14 ENCOUNTER — HOSPITAL ENCOUNTER (OUTPATIENT)
Dept: GENERAL RADIOLOGY | Age: 72
Discharge: HOME | End: 2022-07-14
Payer: MEDICARE

## 2022-07-14 VITALS
BODY MASS INDEX: 24.34 KG/M2 | TEMPERATURE: 97.1 F | SYSTOLIC BLOOD PRESSURE: 120 MMHG | OXYGEN SATURATION: 98 % | WEIGHT: 170 LBS | HEART RATE: 99 BPM | DIASTOLIC BLOOD PRESSURE: 60 MMHG | HEIGHT: 70 IN

## 2022-07-14 VITALS
BODY MASS INDEX: 25.03 KG/M2 | DIASTOLIC BLOOD PRESSURE: 56 MMHG | SYSTOLIC BLOOD PRESSURE: 106 MMHG | WEIGHT: 169 LBS | HEIGHT: 69 IN | OXYGEN SATURATION: 97 % | RESPIRATION RATE: 18 BRPM | TEMPERATURE: 96.5 F | HEART RATE: 73 BPM
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
## 2022-07-14 DIAGNOSIS — M25.532 LEFT WRIST PAIN: ICD-10-CM

## 2022-07-14 PROCEDURE — G8420 CALC BMI NORM PARAMETERS: HCPCS | Performed by: PHYSICIAN ASSISTANT

## 2022-07-14 PROCEDURE — 25010000002 PROPOFOL 10 MG/ML EMULSION: Performed by: NURSE ANESTHETIST, CERTIFIED REGISTERED

## 2022-07-14 PROCEDURE — C1726 CATH, BAL DIL, NON-VASCULAR: HCPCS | Performed by: INTERNAL MEDICINE

## 2022-07-14 PROCEDURE — 99213 OFFICE O/P EST LOW 20 MIN: CPT | Performed by: PHYSICIAN ASSISTANT

## 2022-07-14 PROCEDURE — 1090F PRES/ABSN URINE INCON ASSESS: CPT | Performed by: PHYSICIAN ASSISTANT

## 2022-07-14 PROCEDURE — 73110 X-RAY EXAM OF WRIST: CPT | Performed by: RADIOLOGY

## 2022-07-14 PROCEDURE — 43249 ESOPH EGD DILATION <30 MM: CPT | Performed by: INTERNAL MEDICINE

## 2022-07-14 PROCEDURE — G8399 PT W/DXA RESULTS DOCUMENT: HCPCS | Performed by: PHYSICIAN ASSISTANT

## 2022-07-14 PROCEDURE — 3017F COLORECTAL CA SCREEN DOC REV: CPT | Performed by: PHYSICIAN ASSISTANT

## 2022-07-14 PROCEDURE — 73110 X-RAY EXAM OF WRIST: CPT

## 2022-07-14 PROCEDURE — 1123F ACP DISCUSS/DSCN MKR DOCD: CPT | Performed by: PHYSICIAN ASSISTANT

## 2022-07-14 PROCEDURE — G8427 DOCREV CUR MEDS BY ELIG CLIN: HCPCS | Performed by: PHYSICIAN ASSISTANT

## 2022-07-14 PROCEDURE — 1036F TOBACCO NON-USER: CPT | Performed by: PHYSICIAN ASSISTANT

## 2022-07-14 RX ORDER — LIDOCAINE HYDROCHLORIDE 10 MG/ML
0.5 INJECTION, SOLUTION EPIDURAL; INFILTRATION; INTRACAUDAL; PERINEURAL ONCE AS NEEDED
Status: DISCONTINUED | OUTPATIENT
Start: 2022-07-14 | End: 2022-07-14 | Stop reason: HOSPADM

## 2022-07-14 RX ORDER — SODIUM CHLORIDE 0.9 % (FLUSH) 0.9 %
10 SYRINGE (ML) INJECTION EVERY 12 HOURS SCHEDULED
Status: CANCELLED | OUTPATIENT
Start: 2022-07-14

## 2022-07-14 RX ORDER — SODIUM CHLORIDE 0.9 % (FLUSH) 0.9 %
10 SYRINGE (ML) INJECTION AS NEEDED
Status: DISCONTINUED | OUTPATIENT
Start: 2022-07-14 | End: 2022-07-14 | Stop reason: HOSPADM

## 2022-07-14 RX ORDER — SODIUM CHLORIDE 9 MG/ML
100 INJECTION, SOLUTION INTRAVENOUS CONTINUOUS
Status: CANCELLED | OUTPATIENT
Start: 2022-07-14

## 2022-07-14 RX ORDER — LIDOCAINE HYDROCHLORIDE 20 MG/ML
INJECTION, SOLUTION EPIDURAL; INFILTRATION; INTRACAUDAL; PERINEURAL AS NEEDED
Status: DISCONTINUED | OUTPATIENT
Start: 2022-07-14 | End: 2022-07-14 | Stop reason: SURG

## 2022-07-14 RX ORDER — PROPOFOL 10 MG/ML
VIAL (ML) INTRAVENOUS AS NEEDED
Status: DISCONTINUED | OUTPATIENT
Start: 2022-07-14 | End: 2022-07-14 | Stop reason: SURG

## 2022-07-14 RX ORDER — SODIUM CHLORIDE 9 MG/ML
500 INJECTION, SOLUTION INTRAVENOUS CONTINUOUS PRN
Status: DISCONTINUED | OUTPATIENT
Start: 2022-07-14 | End: 2022-07-14 | Stop reason: HOSPADM

## 2022-07-14 RX ORDER — PANTOPRAZOLE SODIUM 40 MG/1
40 TABLET, DELAYED RELEASE ORAL DAILY
Start: 2022-07-14

## 2022-07-14 RX ORDER — SODIUM CHLORIDE 0.9 % (FLUSH) 0.9 %
10 SYRINGE (ML) INJECTION AS NEEDED
Status: CANCELLED | OUTPATIENT
Start: 2022-07-14

## 2022-07-14 RX ADMIN — PROPOFOL 50 MG: 10 INJECTION, EMULSION INTRAVENOUS at 09:32

## 2022-07-14 RX ADMIN — PROPOFOL 50 MG: 10 INJECTION, EMULSION INTRAVENOUS at 09:34

## 2022-07-14 RX ADMIN — PROPOFOL 50 MG: 10 INJECTION, EMULSION INTRAVENOUS at 09:38

## 2022-07-14 RX ADMIN — LIDOCAINE HYDROCHLORIDE 100 MG: 20 INJECTION, SOLUTION EPIDURAL; INFILTRATION; INTRACAUDAL; PERINEURAL at 09:30

## 2022-07-14 RX ADMIN — SODIUM CHLORIDE 500 ML: 9 INJECTION, SOLUTION INTRAVENOUS at 07:48

## 2022-07-14 RX ADMIN — PROPOFOL 100 MG: 10 INJECTION, EMULSION INTRAVENOUS at 09:30

## 2022-07-14 RX ADMIN — PROPOFOL 50 MG: 10 INJECTION, EMULSION INTRAVENOUS at 09:36

## 2022-07-14 ASSESSMENT — ENCOUNTER SYMPTOMS
SINUS PAIN: 0
SORE THROAT: 0
COUGH: 0
SINUS PRESSURE: 0
NAUSEA: 0
DIARRHEA: 0
ABDOMINAL PAIN: 0
VOMITING: 0
ALLERGIC/IMMUNOLOGIC NEGATIVE: 1
SHORTNESS OF BREATH: 0
EYE PAIN: 0

## 2022-07-14 NOTE — ANESTHESIA POSTPROCEDURE EVALUATION
Patient: Earline MCINTOSHRIDGE    Procedure Summary     Date: 07/14/22 Room / Location: [facility] ENDOSCOPY 5 / [facility] ENDOSCOPY    Anesthesia Start: 0924 Anesthesia Stop: 0950    Procedure: ESOPHAGOGASTRODUODENOSCOPY WITH ANESTHESIA (N/A ) Diagnosis:       Esophageal dysphagia      (Esophageal dysphagia [R13.19])    Surgeons: Xochitl Reyes MD Provider: RADHA Gallagher CRNA    Anesthesia Type: MAC ASA Status: 2          Anesthesia Type: MAC    Vitals  Vitals Value Taken Time   /63 07/14/22 0947   Temp     Pulse 72 07/14/22 0950   Resp     SpO2 93 % 07/14/22 0950   Vitals shown include unvalidated device data.        Post Anesthesia Care and Evaluation    Patient location during evaluation: PACU  Patient participation: complete - patient participated  Level of consciousness: awake and alert  Pain score: 0  Pain management: adequate    Airway patency: patent  Anesthetic complications: No anesthetic complications    Cardiovascular status: acceptable and stable  Respiratory status: acceptable and unassisted  Hydration status: acceptable

## 2022-07-14 NOTE — PATIENT INSTRUCTIONS
Patient Education        Learning About RICE (Rest, Ice, Compression, and Elevation)  What is RICE? RICE is a way to care for an injury. RICE helps relieve pain and swelling. Itmay also help with healing and flexibility. RICE stands for:   R est and protect the injured or sore area.  I ce or a cold pack used as soon as possible.  C ompression, or wrapping the injured or sore area with an elastic bandage.  E levation (propping up) the injured or sore area. How do you do RICE? You can use RICE for home treatment when you have general aches and pains orafter an injury or surgery. Rest   Do not put weight on the injury for at least 24 to 48 hours.  Use crutches for a badly sprained knee or ankle.  Support a sprained wrist, elbow, or shoulder with a sling. Ice   Put ice or a cold pack on the injury right away to reduce pain and swelling. Frozen vegetables will also work as an ice pack. Put a thin cloth between the ice or cold pack and your skin. The cloth protects the injured area from getting too cold.  Use ice for 10 to 15 minutes at a time for the first 48 to 72 hours. Compression   Use compression for sprains, strains, and surgeries of the arms and legs.  Wrap the injured area with an elastic bandage or compression sleeve to reduce swelling.  Don't wrap it too tightly. If the area below it feels numb, tingles, or feels cool, loosen the wrap. Elevation   Use elevation for areas of the body that can be propped up, such as arms and legs.  Prop up the injured area on pillows whenever you use ice. Keep it propped up anytime you sit or lie down.  Try to keep the injured area at or above the level of your heart. This will help reduce swelling and bruising. Where can you learn more? Go to https://6sicuro.itermiaseb.[employer]. org and sign in to your Picodeon account.  Enter K391 in the Biocept box to learn more about \"Learning About RICE (Rest, Ice, Compression, and swelling.  Take pain medicines exactly as directed. ? If the doctor gave you a prescription medicine for pain, take it as prescribed. ? If you are not taking a prescription pain medicine, ask your doctor if you can take an over-the-counter medicine.  Do exercises as directed by your doctor or physical therapist.  [facility] Return to your usual level of activity slowly.  Do not do anything that makes the pain worse. When should you call for help? Call your doctor now or seek immediate medical care if:     You have severe or increasing pain.      You have tingling, weakness, or numbness in the area.      The area turns cold or changes color.      Your cast or splint feels too tight.      You have symptoms of a blood clot, such as:  ? Pain in your calf, back of the knee, thigh, or groin. ? Redness and swelling in your leg or groin.      You cannot move the strained part of your body. Watch closely for changes in your health, and be sure to contact your doctor if:     You do not get better as expected. Where can you learn more? Go to https://[employer].Twinklr. org and sign in to your [email or web address] account. Enter P629 in the Vtion [employer] Technology box to learn more about \"Strain or Sprain: Care Instructions. \"     If you do not have an account, please click on the \"Sign Up Now\" link. Current as of: March 9, 2022               Content Version: 13.3  © 2874-5978 Healthwise, Incorporated. Care instructions adapted under license by [facility] ([facility]). If you have questions about a medical condition or this instruction, always ask your healthcare professional. [place] [address] any warranty or liability for your use of this information. 1. XR left wrist- will call pt with results  2. Continue Rest, Ice, compression and elevation. As well as ibuprofen as needed for pain. 3. Will set pt up with Ortho if fractured. Patient verbalizes understanding and agrees with treatment plan.

## 2022-07-14 NOTE — ANESTHESIA PREPROCEDURE EVALUATION
Anesthesia Evaluation     Patient summary reviewed and Nursing notes reviewed   no history of anesthetic complications:  NPO Solid Status: > 8 hours  NPO Liquid Status: > 8 hours           Airway   Mallampati: I  TM distance: >3 FB  Neck ROM: full  No difficulty expected  Dental          Pulmonary    (+) a smoker Former, sleep apnea on CPAP,   Cardiovascular   Exercise tolerance: good (4-7 METS)    (+) valvular problems/murmurs (PFO s/p closure), hyperlipidemia,       Neuro/Psych  GI/Hepatic/Renal/Endo    (+)  GERD,      Musculoskeletal     Abdominal    Substance History      OB/GYN          Other                        Anesthesia Plan    ASA 2     MAC     intravenous induction     Anesthetic plan, risks, benefits, and alternatives have been provided, discussed and informed consent has been obtained with: patient.        CODE STATUS:

## 2022-07-14 NOTE — PROGRESS NOTES
Medication Sig Dispense Refill    citalopram (CELEXA) 20 MG tablet TAKE 1 TABLET BY MOUTH ONCE DAILY      ergocalciferol (ERGOCALCIFEROL) 1.25 MG (77033 UT) capsule Take 50,000 Units by mouth once a week      pantoprazole (PROTONIX) 40 MG tablet TAKE 1 TABLET BY MOUTH ONCE DAILY FOR REFLUX      montelukast (SINGULAIR) 10 MG tablet TAKE 1 TABLET BY MOUTH ONCE DAILY FOR ALLERGIES      traZODone (DESYREL) 50 MG tablet TAKE 1 TABLET BY MOUTH AT BEDTIME AS NEEDED      triamcinolone (KENALOG) 0.025 % cream Apply topically 2 times daily      Vitamin D, Cholecalciferol, 25 MCG (1000 UT) CAPS       azelastine (ASTELIN) 0.1 % nasal spray 274 mcg by Nasal route daily      Apoaequorin (PREVAGEN) 10 MG CAPS Take by mouth      albuterol sulfate HFA (PROVENTIL;VENTOLIN;PROAIR) 108 (90 Base) MCG/ACT inhaler INHALE 2 PUFFS BY MOUTH EVERY 4 HOURS AS NEEDED FOR SHORTNESS OF BREATH OR WHEEZING      albuterol (ACCUNEB) 0.63 MG/3ML nebulizer solution USE 1 VIAL IN NEBULIZER AS DIRECTED      tiotropium (SPIRIVA RESPIMAT) 2.5 MCG/ACT AERS inhaler Inhale 2 puffs into the lungs daily      buPROPion (WELLBUTRIN XL) 300 MG extended release tablet       levocetirizine (XYZAL) 5 MG tablet Take 5 mg by mouth every evening (Patient not taking: Reported on 7/14/2022)      pantoprazole (PROTONIX) 40 MG tablet Take 1 tablet by mouth 2 times daily (before meals) (Patient not taking: Reported on 7/14/2022) 60 tablet 11    pramipexole (MIRAPEX) 1 MG tablet Take 1 mg by mouth 3 times daily      spironolactone (ALDACTONE) 25 MG tablet        No current facility-administered medications for this visit.        No Known Allergies    Health Maintenance   Topic Date Due    Annual Wellness Visit (AWV)  Never done    COVID-19 Vaccine (1) Never done    Depression Screen  Never done    Hepatitis C screen  Never done    DTaP/Tdap/Td vaccine (1 - Tdap) Never done    Lipids  Never done    Shingles vaccine (1 of 2) Never done    Pneumococcal 65+ years Vaccine (1 - PCV) Never done    Flu vaccine (1) 09/01/2022    Breast cancer screen  12/27/2022    Colorectal Cancer Screen  04/29/2031    DEXA (modify frequency per FRAX score)  Completed    Hepatitis A vaccine  Aged Out    Hepatitis B vaccine  Aged Out    Hib vaccine  Aged Out    Meningococcal (ACWY) vaccine  Aged Out       Subjective:   Review of Systems   Constitutional: Negative for chills, fatigue and fever. HENT: Negative for congestion, postnasal drip, sinus pressure, sinus pain and sore throat. Eyes: Negative for pain and visual disturbance. Respiratory: Negative for cough and shortness of breath. Cardiovascular: Negative for chest pain. Gastrointestinal: Negative for abdominal pain, diarrhea, nausea and vomiting. Endocrine: Negative for cold intolerance and heat intolerance. Genitourinary: Negative for frequency, hematuria and urgency. Musculoskeletal: Positive for arthralgias, joint swelling and stiffness. Negative for myalgias. Skin: Negative for rash. Allergic/Immunologic: Negative. Neurological: Negative for syncope, weakness, light-headedness and headaches. Hematological: Negative. Psychiatric/Behavioral: Negative. Objective    Physical Exam  Constitutional:       General: She is not in acute distress. Appearance: Normal appearance. HENT:      Head: Normocephalic and atraumatic. Right Ear: External ear normal.      Left Ear: External ear normal.      Nose: Nose normal.      Mouth/Throat:      Mouth: Mucous membranes are moist.      Pharynx: Oropharynx is clear. Eyes:      Extraocular Movements: Extraocular movements intact. Conjunctiva/sclera: Conjunctivae normal.   Cardiovascular:      Rate and Rhythm: Normal rate and regular rhythm. Pulses: Normal pulses. Heart sounds: Normal heart sounds. Pulmonary:      Effort: Pulmonary effort is normal.      Breath sounds: Normal breath sounds. No wheezing.    Abdominal: was given educational materials - see patient instructions below. Patient Instructions       Patient Education        Learning About RICE (Rest, Ice, Compression, and Elevation)  What is RICE? RICE is a way to care for an injury. RICE helps relieve pain and swelling. Itmay also help with healing and flexibility. RICE stands for:   R est and protect the injured or sore area.  I ce or a cold pack used as soon as possible.  C ompression, or wrapping the injured or sore area with an elastic bandage.  E levation (propping up) the injured or sore area. How do you do RICE? You can use RICE for home treatment when you have general aches and pains orafter an injury or surgery. Rest   Do not put weight on the injury for at least 24 to 48 hours.  Use crutches for a badly sprained knee or ankle.  Support a sprained wrist, elbow, or shoulder with a sling. Ice   Put ice or a cold pack on the injury right away to reduce pain and swelling. Frozen vegetables will also work as an ice pack. Put a thin cloth between the ice or cold pack and your skin. The cloth protects the injured area from getting too cold.  Use ice for 10 to 15 minutes at a time for the first 48 to 72 hours. Compression   Use compression for sprains, strains, and surgeries of the arms and legs.  Wrap the injured area with an elastic bandage or compression sleeve to reduce swelling.  Don't wrap it too tightly. If the area below it feels numb, tingles, or feels cool, loosen the wrap. Elevation   Use elevation for areas of the body that can be propped up, such as arms and legs.  Prop up the injured area on pillows whenever you use ice. Keep it propped up anytime you sit or lie down.  Try to keep the injured area at or above the level of your heart. This will help reduce swelling and bruising. Where can you learn more? Go to https://leila.ReadWave. org and sign in to your [employer]. account.  Enter R260 in the Search Health Information box to learn more about \"Learning About RICE (Rest, Ice, Compression, and Elevation). \"     If you do not have an account, please click on the \"Sign Up Now\" link. Current as of: March 9, 2022               Content Version: 13.3  © 2006-2022 [employer]. Care instructions adapted under license by [facility] ([facility]). If you have questions about a medical condition or this instruction, always ask your healthcare professional. [place] [address] any warranty or liability for your use of this information. Patient Education        Strain or Sprain: Care Instructions  Your Care Instructions     A strain happens when you overstretch, or pull, a muscle. A sprain occurs when you stretch or tear a ligament, the tough tissue that connects one bone to another. These problems can happen when you exercise or lift something or whenyou are in an accident. Rest and other home care can help strains and sprains heal.  The doctor has checked you carefully, but problems can develop later. If you notice any problems or new symptoms,  get medical treatment right away. Follow-up care is a key part of your treatment and safety. Be sure to make and go to all appointments, and call your doctor if you are having problems. It's also a good idea to know your test results and keep alist of the medicines you take. How can you care for yourself at home?  If your doctor gave you a sling, splint, brace, or immobilizer, use it exactly as directed.  Rest the strained or sprained area, and follow your doctor's advice about when you can be active again.  Put ice or a cold pack on the sore area for 10 to 20 minutes at a time to stop swelling. Try this every 1 to 2 hours for 3 days (when you are awake) or until the swelling goes down. Put a thin cloth between the ice pack and your skin. Keep your splint or brace dry.    Prop up a sore arm or leg on a pillow when you ice it or anytime you sit or lie down. Try to keep it higher than the level of your heart. This will help reduce swelling.  Take pain medicines exactly as directed. ? If the doctor gave you a prescription medicine for pain, take it as prescribed. ? If you are not taking a prescription pain medicine, ask your doctor if you can take an over-the-counter medicine.  Do exercises as directed by your doctor or physical therapist.  [facility] Return to your usual level of activity slowly.  Do not do anything that makes the pain worse. When should you call for help? Call your doctor now or seek immediate medical care if:     You have severe or increasing pain.      You have tingling, weakness, or numbness in the area.      The area turns cold or changes color.      Your cast or splint feels too tight.      You have symptoms of a blood clot, such as:  ? Pain in your calf, back of the knee, thigh, or groin. ? Redness and swelling in your leg or groin.      You cannot move the strained part of your body. Watch closely for changes in your health, and be sure to contact your doctor if:     You do not get better as expected. Where can you learn more? Go to https://Music NationpeConservus Internationaleb.[employer]. org and sign in to your ExactFlat account. Enter I897 in the Rallyhood box to learn more about \"Strain or Sprain: Care Instructions. \"     If you do not have an account, please click on the \"Sign Up Now\" link. Current as of: March 9, 2022               Content Version: 13.3  © 2006-2022 [employer]. Care instructions adapted under license by [facility] [place] ([facility]). If you have questions about a medical condition or this instruction, always ask your healthcare professional. [place] [address] any warranty or liability for your use of this information. 1. XR left wrist- will call pt with results  2. Continue Rest, Ice, compression and elevation. As well as ibuprofen as needed for pain.   3. Will set pt up with Ortho if fractured. Patient verbalizes understanding and agrees with treatment plan.           Electronically signed by Josefina Reilly PA-C on 7/14/2022 at 4:10 PM

## 2022-07-24 ENCOUNTER — APPOINTMENT (OUTPATIENT)
Dept: GENERAL RADIOLOGY | Age: 72
End: 2022-07-24
Payer: MEDICARE

## 2022-07-24 ENCOUNTER — HOSPITAL ENCOUNTER (EMERGENCY)
Age: 72
Discharge: HOME | End: 2022-07-24
Payer: MEDICARE

## 2022-07-24 VITALS
HEART RATE: 74 BPM | SYSTOLIC BLOOD PRESSURE: 127 MMHG | DIASTOLIC BLOOD PRESSURE: 64 MMHG | OXYGEN SATURATION: 93 % | RESPIRATION RATE: 18 BRPM | TEMPERATURE: 97.4 F

## 2022-07-24 DIAGNOSIS — R60.0 LEG EDEMA: Primary | ICD-10-CM

## 2022-07-24 LAB
ALBUMIN SERPL-MCNC: 4.2 G/DL (ref 3.5–5.2)
ALP BLD-CCNC: 105 U/L (ref 35–104)
ALT SERPL-CCNC: 21 U/L (ref 5–33)
ANION GAP SERPL CALCULATED.3IONS-SCNC: 10 MMOL/L (ref 7–19)
AST SERPL-CCNC: 22 U/L (ref 5–32)
BASOPHILS ABSOLUTE: 0.1 K/UL (ref 0–0.2)
BASOPHILS RELATIVE PERCENT: 1 % (ref 0–1)
BILIRUB SERPL-MCNC: 0.7 MG/DL (ref 0.2–1.2)
BUN BLDV-MCNC: 17 MG/DL (ref 8–23)
C-REACTIVE PROTEIN: 0.34 MG/DL (ref 0–0.5)
CALCIUM SERPL-MCNC: 9.7 MG/DL (ref 8.8–10.2)
CHLORIDE BLD-SCNC: 103 MMOL/L (ref 98–111)
CO2: 25 MMOL/L (ref 22–29)
CREAT SERPL-MCNC: 0.9 MG/DL (ref 0.5–0.9)
EOSINOPHILS ABSOLUTE: 0.3 K/UL (ref 0–0.6)
EOSINOPHILS RELATIVE PERCENT: 4.8 % (ref 0–5)
GFR AFRICAN AMERICAN: >59
GFR NON-AFRICAN AMERICAN: >60
GLUCOSE BLD-MCNC: 103 MG/DL (ref 74–109)
HCT VFR BLD CALC: 42.7 % (ref 37–47)
HEMOGLOBIN: 14.5 G/DL (ref 12–16)
IMMATURE GRANULOCYTES #: 0 K/UL
LYMPHOCYTES ABSOLUTE: 1 K/UL (ref 1.1–4.5)
LYMPHOCYTES RELATIVE PERCENT: 16.8 % (ref 20–40)
MCH RBC QN AUTO: 31.5 PG (ref 27–31)
MCHC RBC AUTO-ENTMCNC: 34 G/DL (ref 33–37)
MCV RBC AUTO: 92.8 FL (ref 81–99)
MONOCYTES ABSOLUTE: 0.3 K/UL (ref 0–0.9)
MONOCYTES RELATIVE PERCENT: 5.9 % (ref 0–10)
NEUTROPHILS ABSOLUTE: 4.1 K/UL (ref 1.5–7.5)
NEUTROPHILS RELATIVE PERCENT: 71.3 % (ref 50–65)
PDW BLD-RTO: 11.9 % (ref 11.5–14.5)
PLATELET # BLD: 249 K/UL (ref 130–400)
PMV BLD AUTO: 9.6 FL (ref 9.4–12.3)
POTASSIUM REFLEX MAGNESIUM: 4.4 MMOL/L (ref 3.5–5)
RBC # BLD: 4.6 M/UL (ref 4.2–5.4)
SEDIMENTATION RATE, ERYTHROCYTE: 8 MM/HR (ref 0–25)
SODIUM BLD-SCNC: 138 MMOL/L (ref 136–145)
TOTAL PROTEIN: 6.7 G/DL (ref 6.6–8.7)
WBC # BLD: 5.8 K/UL (ref 4.8–10.8)

## 2022-07-24 PROCEDURE — 85025 COMPLETE CBC W/AUTO DIFF WBC: CPT

## 2022-07-24 PROCEDURE — 99284 EMERGENCY DEPT VISIT MOD MDM: CPT

## 2022-07-24 PROCEDURE — 85652 RBC SED RATE AUTOMATED: CPT

## 2022-07-24 PROCEDURE — 73630 X-RAY EXAM OF FOOT: CPT

## 2022-07-24 PROCEDURE — 80053 COMPREHEN METABOLIC PANEL: CPT

## 2022-07-24 PROCEDURE — 93971 EXTREMITY STUDY: CPT

## 2022-07-24 PROCEDURE — 86140 C-REACTIVE PROTEIN: CPT

## 2022-07-24 PROCEDURE — 73630 X-RAY EXAM OF FOOT: CPT | Performed by: RADIOLOGY

## 2022-07-24 PROCEDURE — 36415 COLL VENOUS BLD VENIPUNCTURE: CPT

## 2022-07-24 RX ORDER — PREDNISONE 10 MG/1
10 TABLET ORAL DAILY
Qty: 10 TABLET | Refills: 0 | Status: SHIPPED | OUTPATIENT
Start: 2022-07-24 | End: 2022-08-03

## 2022-07-24 RX ORDER — CEPHALEXIN 500 MG/1
500 CAPSULE ORAL 2 TIMES DAILY
Qty: 14 CAPSULE | Refills: 0 | Status: SHIPPED | OUTPATIENT
Start: 2022-07-24 | End: 2022-07-31

## 2022-07-24 ASSESSMENT — ENCOUNTER SYMPTOMS
BACK PAIN: 0
SHORTNESS OF BREATH: 0
EYE PAIN: 0
RHINORRHEA: 0
PHOTOPHOBIA: 0
NAUSEA: 0
EYE DISCHARGE: 0
COUGH: 0
SORE THROAT: 0
APNEA: 0
COLOR CHANGE: 0
ABDOMINAL DISTENTION: 0
ABDOMINAL PAIN: 0

## 2022-07-24 ASSESSMENT — PAIN DESCRIPTION - LOCATION: LOCATION: ANKLE;FOOT

## 2022-07-24 ASSESSMENT — PAIN SCALES - GENERAL: PAINLEVEL_OUTOF10: 2

## 2022-07-24 ASSESSMENT — PAIN DESCRIPTION - ORIENTATION: ORIENTATION: RIGHT

## 2022-07-24 NOTE — ED PROVIDER NOTES
[facility] EMERGENCY DEPT  eMERGENCYdEPARTMENT eNCOUnter      Pt Name: Celena Flores  MRN: [id number]  [place] 1950  Date of evaluation: 7/24/2022  Provider:JESUS Vazquez    CHIEF COMPLAINT       Chief Complaint   Patient presents with    Leg Swelling     Right leg swelling with redness for several days         HISTORY OF PRESENT ILLNESS  (Location/Symptom, Timing/Onset, Context/Setting, Quality, Duration, Modifying Factors, Severity.)   Celena Flores is a 71 y.o. female who presents to the emergency department with complaints of right ankle foot and swelling 2 weeks prior great toe fracture. She is ambulating has unilateral swelling ruddiness denies obvious erythema. No injury no prior gout. HPI    Nursing Notes were reviewed and I agree. REVIEW OF SYSTEMS    (2-9 systems for level 4, 10 or more for level 5)     Review of Systems   Constitutional:  Negative for activity change, appetite change, chills and fever. HENT:  Negative for congestion, postnasal drip, rhinorrhea and sore throat. Eyes:  Negative for photophobia, pain, discharge and visual disturbance. Respiratory:  Negative for apnea, cough and shortness of breath. Cardiovascular:  Negative for chest pain and leg swelling. Gastrointestinal:  Negative for abdominal distention, abdominal pain and nausea. Genitourinary:  Negative for vaginal bleeding. Musculoskeletal:  Positive for arthralgias and joint swelling. Negative for back pain, neck pain and neck stiffness. Skin:  Negative for color change and rash. Neurological:  Negative for dizziness, syncope, facial asymmetry and headaches. Hematological:  Negative for adenopathy. Does not bruise/bleed easily. Psychiatric/Behavioral:  Negative for agitation, behavioral problems and confusion. Except as noted above the remainder of the review of systems was reviewed and negative.        PAST MEDICAL HISTORY     Past Medical History:   Diagnosis Date    Depression Restless legs syndrome          SURGICAL HISTORY       Past Surgical History:   Procedure Laterality Date    APPENDECTOMY      ARM SURGERY      COLONOSCOPY  04/29/2021    Dr Chinmay Nascimento TA x 5,    ESOPHAGOGASTRODUODENOSCOPY  05/12/2017    Dr Chinmay Nascimento    ESOPHAGOGASTRODUODENOSCOPY  04/29/2021    Dr Nicholas Tolbert Dilation    HYSTERECTOMY (CERVIX STATUS UNKNOWN)  1990    age 45    Humphrey Castillo Bilateral 1990    age 45    [address]       Discharge Medication List as of 7/24/2022 12:45 PM        CONTINUE these medications which have NOT CHANGED    Details   citalopram (CELEXA) 20 MG tablet TAKE 1 TABLET BY MOUTH ONCE DAILYHistorical Med      ergocalciferol (ERGOCALCIFEROL) 1.25 MG (53955 UT) capsule Take 50,000 Units by mouth once a weekHistorical Med      !! pantoprazole (PROTONIX) 40 MG tablet TAKE 1 TABLET BY MOUTH ONCE DAILY FOR REFLUXHistorical Med      montelukast (SINGULAIR) 10 MG tablet TAKE 1 TABLET BY MOUTH ONCE DAILY FOR ALLERGIESHistorical Med      traZODone (DESYREL) 50 MG tablet TAKE 1 TABLET BY MOUTH AT BEDTIME AS NEEDEDHistorical Med      triamcinolone (KENALOG) 0.025 % cream Apply topically 2 times daily, Topical, 2 TIMES DAILY Starting Thu 10/21/2021, Historical Med      levocetirizine (XYZAL) 5 MG tablet Take 5 mg by mouth every eveningHistorical Med      Vitamin D, Cholecalciferol, 25 MCG (1000 UT) CAPS Historical Med      azelastine (ASTELIN) 0.1 % nasal spray 274 mcg by Nasal route dailyHistorical Med      Apoaequorin (PREVAGEN) 10 MG CAPS Take by mouthHistorical Med      albuterol sulfate HFA (PROVENTIL;VENTOLIN;PROAIR) 108 (90 Base) MCG/ACT inhaler INHALE 2 PUFFS BY MOUTH EVERY 4 HOURS AS NEEDED FOR SHORTNESS OF BREATH OR WHEEZINGHistorical Med      albuterol (ACCUNEB) 0.63 MG/3ML nebulizer solution USE 1 VIAL IN NEBULIZER AS DIRECTEDHistorical Med      tiotropium (SPIRIVA RESPIMAT) 2.5 MCG/ACT AERS inhaler Inhale 2 puffs into the lungs dailyHistorical Med      !! pantoprazole (PROTONIX) 40 MG tablet Take 1 tablet by mouth 2 times daily (before meals), Disp-60 tablet, R-11Normal      buPROPion (WELLBUTRIN XL) 300 MG extended release tablet Historical Med      pramipexole (MIRAPEX) 1 MG tablet Take 1 mg by mouth 3 times dailyHistorical Med      spironolactone (ALDACTONE) 25 MG tablet Historical Med       !! - Potential duplicate medications found. Please discuss with provider. ALLERGIES     Patient has no known allergies. FAMILY HISTORY       Family History   Problem Relation Age of Onset    Breast Cancer Mother 66          SOCIAL HISTORY       Social History     Socioeconomic History    Marital status:      Spouse name: None    Number of children: None    Years of education: None    Highest education level: None   Tobacco Use    Smoking status: Former     Packs/day: 2.00     Years: 39.00     Pack years: 78.00     Types: Cigarettes     Quit date:      Years since quittin.5    Smokeless tobacco: Never   Vaping Use    Vaping Use: Never used   Substance and Sexual Activity    Alcohol use: Yes     Comment: occ    Drug use: Never       SCREENINGS    [place] Coma Scale  Eye Opening: Spontaneous  Best Verbal Response: Oriented  Best Motor Response: Obeys commands  Ann Coma Scale Score: 15      PHYSICAL EXAM    (up to 7 forlevel 4, 8 or more for level 5)     ED Triage Vitals [22 0940]   BP Temp Temp src Heart Rate Resp SpO2 Height Weight   (!) 124/58 97.4 °F (36.3 °C) -- 74 18 91 % -- --       Physical Exam  Vitals and nursing note reviewed. Constitutional:       General: She is not in acute distress. Appearance: Normal appearance. She is well-developed. She is not diaphoretic. HENT:      Head: Normocephalic and atraumatic.       Right Ear: Tympanic membrane, ear canal and external ear normal.      Left Ear: Tympanic membrane, ear canal and external ear normal.      Nose: Nose normal.      Mouth/Throat: Mouth: Mucous membranes are moist.      Pharynx: No oropharyngeal exudate. Eyes:      General:         Right eye: No discharge. Left eye: No discharge. Pupils: Pupils are equal, round, and reactive to light. Neck:      Thyroid: No thyromegaly. Cardiovascular:      Rate and Rhythm: Normal rate and regular rhythm. Pulses: Normal pulses. Heart sounds: Normal heart sounds. No murmur heard. No friction rub. Pulmonary:      Effort: Pulmonary effort is normal. No respiratory distress. Breath sounds: Normal breath sounds. No stridor. No wheezing. Abdominal:      General: Abdomen is flat. Bowel sounds are normal. There is no distension. Palpations: Abdomen is soft. Tenderness: There is no abdominal tenderness. Musculoskeletal:         General: Tenderness present. Normal range of motion. Cervical back: Normal range of motion and neck supple. Right lower leg: Edema present. Skin:     General: Skin is warm and dry. Capillary Refill: Capillary refill takes less than 2 seconds. Findings: Erythema present. No rash. Neurological:      General: No focal deficit present. Mental Status: She is alert and oriented to person, place, and time. Mental status is at baseline. Cranial Nerves: No cranial nerve deficit. Sensory: No sensory deficit. Coordination: Coordination normal.   Psychiatric:         Mood and Affect: Mood normal.         Behavior: Behavior normal.         Thought Content:  Thought content normal.         Judgment: Judgment normal.         DIAGNOSTIC RESULTS     RADIOLOGY:   Non-plain film images such as CT, Ultrasound and MRI are read by the radiologist. Plain radiographic images are visualized and preliminarilyinterpreted by No att. providers found with the below findings:      Interpretation per the Radiologist below, if available at the time of this note:    VL Extremity Venous Right   Final Result      XR FOOT RIGHT (MIN 3 VIEWS)   Final Result   No acute pathology. First MTP degenerative changes with subarticular cyst formation. Recommendation:    Follow up as clinically indicated. Note: Direct correlation with point tenderness and the X-ray images is recommended and does significantly increase the sensitivity of radiographic evaluation. Electronically Signed by Felicia Franz MD at 24-Jul-2022 01:27:27 PM                   LABS:  Labs Reviewed   CBC WITH AUTO DIFFERENTIAL - Abnormal; Notable for the following components:       Result Value    MCH 31.5 (*)     Neutrophils % 71.3 (*)     Lymphocytes % 16.8 (*)     Lymphocytes Absolute 1.0 (*)     All other components within normal limits   COMPREHENSIVE METABOLIC PANEL W/ REFLEX TO MG FOR LOW K - Abnormal; Notable for the following components:    Alkaline Phosphatase 105 (*)     All other components within normal limits   SEDIMENTATION RATE   C-REACTIVE PROTEIN       All other labs were within normal range or notreturned as of this dictation. RE-ASSESSMENT        EMERGENCY DEPARTMENT COURSE and DIFFERENTIAL DIAGNOSIS/MDM:   Vitals:    Vitals:    07/24/22 0940 07/24/22 1042 07/24/22 1102   BP: (!) 124/58 (!) 119/53 127/64   Pulse: 74     Resp: 18     Temp: 97.4 °F (36.3 °C)     SpO2: 91% 98% 93%         MDM  No obvious source of infection and venous study is negative we will cover prophylactically I think compression elevation might be key for her she does have an arthritic history it may be playing a role in her ankle something to consider following up with a foot specialist locally if things persisting. She is agreeable all questions were entertained and answered is requesting discharge she can weight-bear as tolerated. Understands return precautions. PROCEDURES:    Procedures      FINAL IMPRESSION      1.  Leg edema          DISPOSITION/PLAN   DISPOSITION Decision To Discharge 07/24/2022 12:40:55 PM      PATIENT REFERRED TO:  [facility] EMERGENCY DEPT  [address] [facility] [address] [facility] [place]  [phone]    If symptoms worsen    DO Heber Kennedy    Schedule an appointment as soon as possible for a visit in 3 days      DISCHARGE MEDICATIONS:  Discharge Medication List as of 7/24/2022 12:45 PM        START taking these medications    Details   cephALEXin (KEFLEX) 500 MG capsule Take 1 capsule by mouth in the morning and 1 capsule before bedtime. Do all this for 7 days. , Disp-14 capsule, R-0Normal      predniSONE (DELTASONE) 10 MG tablet Take 1 tablet by mouth in the morning for 10 days.  ., Disp-10 tablet, R-0Normal             (Please note that portions of this note were completed with a voice recognition program.  Efforts were made to edit the dictations but occasionallywords are mis-transcribed.)    JESUS Hall, [place]  07/25/22 2454

## 2022-08-08 ENCOUNTER — OFFICE VISIT (OUTPATIENT)
Dept: PULMONOLOGY | Age: 72
End: 2022-08-08
Payer: MEDICARE

## 2022-08-08 VITALS
SYSTOLIC BLOOD PRESSURE: 114 MMHG | TEMPERATURE: 97.6 F | BODY MASS INDEX: 23.88 KG/M2 | HEIGHT: 70 IN | WEIGHT: 166.8 LBS | OXYGEN SATURATION: 95 % | HEART RATE: 76 BPM | DIASTOLIC BLOOD PRESSURE: 70 MMHG

## 2022-08-08 DIAGNOSIS — R94.2 DIFFUSION CAPACITY OF LUNG (DL), DECREASED: ICD-10-CM

## 2022-08-08 DIAGNOSIS — R05.3 CHRONIC COUGH: Primary | ICD-10-CM

## 2022-08-08 DIAGNOSIS — R13.19 ESOPHAGEAL DYSPHAGIA: ICD-10-CM

## 2022-08-08 DIAGNOSIS — Z77.22 SECOND HAND SMOKE EXPOSURE: ICD-10-CM

## 2022-08-08 DIAGNOSIS — K21.9 GASTROESOPHAGEAL REFLUX DISEASE WITHOUT ESOPHAGITIS: ICD-10-CM

## 2022-08-08 PROCEDURE — G8420 CALC BMI NORM PARAMETERS: HCPCS | Performed by: INTERNAL MEDICINE

## 2022-08-08 PROCEDURE — 3017F COLORECTAL CA SCREEN DOC REV: CPT | Performed by: INTERNAL MEDICINE

## 2022-08-08 PROCEDURE — 1036F TOBACCO NON-USER: CPT | Performed by: INTERNAL MEDICINE

## 2022-08-08 PROCEDURE — 1090F PRES/ABSN URINE INCON ASSESS: CPT | Performed by: INTERNAL MEDICINE

## 2022-08-08 PROCEDURE — 1123F ACP DISCUSS/DSCN MKR DOCD: CPT | Performed by: INTERNAL MEDICINE

## 2022-08-08 PROCEDURE — G8399 PT W/DXA RESULTS DOCUMENT: HCPCS | Performed by: INTERNAL MEDICINE

## 2022-08-08 PROCEDURE — 99214 OFFICE O/P EST MOD 30 MIN: CPT | Performed by: INTERNAL MEDICINE

## 2022-08-08 PROCEDURE — G8427 DOCREV CUR MEDS BY ELIG CLIN: HCPCS | Performed by: INTERNAL MEDICINE

## 2022-08-08 ASSESSMENT — ENCOUNTER SYMPTOMS
APNEA: 0
COUGH: 1
RHINORRHEA: 0
ABDOMINAL DISTENTION: 0
ABDOMINAL PAIN: 0
CHEST TIGHTNESS: 0
BACK PAIN: 0
SHORTNESS OF BREATH: 1
WHEEZING: 0
ANAL BLEEDING: 0

## 2022-08-08 NOTE — PROGRESS NOTES
Pulmonary and Sleep Medicine    Asif Bhatia (:  1950) is a 71 y.o. female,Established patient, here for evaluation of the following chief complaint(s):  Follow-up (Pt following up after endoscopy.)      Referring physician:  No referring provider defined for this encounter. ASSESSMENT/PLAN:  1. Chronic cough  -     Full PFT Study With Bronchodilator; Future  2. Second hand smoke exposure  3. Esophageal dysphagia  4. Gastroesophageal reflux disease without esophagitis  5. Diffusion capacity of lung (dl), decreased  -     Full PFT Study With Bronchodilator; Future  -     CT CHEST WO CONTRAST; Future      Continue current management with proton pump inhibitor for GERD induced cough. Will repeat PFT and CT of the chest in 8 months for follow up. Doris Hood MD, [facility], [facility]    Return in about 8 months (around 2023). SUBJECTIVE/OBJECTIVE:  The patient is here for follow-up after her upper endoscopy. She had esophageal bougienage, she says her cough during the night has subsided while on proton pump inhibitor. Prior to Visit Medications    Medication Sig Taking?  Authorizing Provider   citalopram (CELEXA) 20 MG tablet TAKE 1 TABLET BY MOUTH ONCE DAILY Yes Historical Provider, MD   ergocalciferol (ERGOCALCIFEROL) 1.25 MG (34760 UT) capsule Take 50,000 Units by mouth once a week Yes Historical Provider, MD   pantoprazole (PROTONIX) 40 MG tablet TAKE 1 TABLET BY MOUTH ONCE DAILY FOR REFLUX Yes Historical Provider, MD   montelukast (SINGULAIR) 10 MG tablet TAKE 1 TABLET BY MOUTH ONCE DAILY FOR ALLERGIES Yes Historical Provider, MD   traZODone (DESYREL) 50 MG tablet TAKE 1 TABLET BY MOUTH AT BEDTIME AS NEEDED Yes Historical Provider, MD   triamcinolone (KENALOG) 0.025 % cream Apply topically 2 times daily Yes Historical Provider, MD   Vitamin D, Cholecalciferol, 25 MCG (1000 UT) CAPS  Yes Historical Provider, MD   azelastine (ASTELIN) 0.1 % nasal spray 274 mcg by Nasal route daily Yes Historical Provider, MD   Apoaequorin (PREVAGEN) 10 MG CAPS Take by mouth Yes Historical Provider, MD   albuterol sulfate HFA (PROVENTIL;VENTOLIN;PROAIR) 108 (90 Base) MCG/ACT inhaler INHALE 2 PUFFS BY MOUTH EVERY 4 HOURS AS NEEDED FOR SHORTNESS OF BREATH OR WHEEZING Yes Historical Provider, MD   albuterol (ACCUNEB) 0.63 MG/3ML nebulizer solution USE 1 VIAL IN NEBULIZER AS DIRECTED Yes Historical Provider, MD   tiotropium (SPIRIVA RESPIMAT) 2.5 MCG/ACT AERS inhaler Inhale 2 puffs into the lungs daily Yes Historical Provider, MD   buPROPion (WELLBUTRIN XL) 300 MG extended release tablet  Yes Historical Provider, MD   levocetirizine (XYZAL) 5 MG tablet Take 5 mg by mouth every evening  Patient not taking: No sig reported  Historical Provider, MD   pantoprazole (PROTONIX) 40 MG tablet Take 1 tablet by mouth 2 times daily (before meals)  Patient not taking: No sig reported  Soraida Greco MD   pramipexole (MIRAPEX) 1 MG tablet Take 1 mg by mouth 3 times daily  Historical Provider, MD   spironolactone (ALDACTONE) 25 MG tablet   Historical Provider, MD        Review of Systems   Constitutional:  Negative for activity change, appetite change, chills, diaphoresis and fatigue. HENT:  Negative for congestion, dental problem, drooling, ear discharge, postnasal drip and rhinorrhea. Eyes:  Negative for visual disturbance. Respiratory:  Positive for cough and shortness of breath. Negative for apnea, chest tightness and wheezing. Gastrointestinal:  Negative for abdominal distention, abdominal pain and anal bleeding. Endocrine: Negative for cold intolerance, heat intolerance and polydipsia. Genitourinary:  Negative for difficulty urinating, dysuria, enuresis and flank pain. Musculoskeletal:  Negative for arthralgias, back pain and gait problem. Allergic/Immunologic: Negative for environmental allergies. Neurological:  Negative for dizziness, facial asymmetry, light-headedness and headaches. Vitals:    08/08/22 0904   BP: 114/70   Pulse: 76   Temp: 97.6 °F (36.4 °C)   SpO2: 95%     BMI Readings from Last 1 Encounters:   08/08/22 23.93 kg/m²         Physical Exam  Vitals reviewed. Constitutional:       Appearance: Normal appearance. HENT:      Head: Normocephalic and atraumatic. Nose: Nose normal.   Eyes:      Extraocular Movements: Extraocular movements intact. Conjunctiva/sclera: Conjunctivae normal.   Cardiovascular:      Rate and Rhythm: Normal rate and regular rhythm. Heart sounds: No murmur heard. No friction rub. Pulmonary:      Effort: Pulmonary effort is normal. No respiratory distress. Breath sounds: Normal breath sounds. No stridor. No wheezing, rhonchi or rales. Abdominal:      General: There is no distension. Palpations: There is no mass. Tenderness: There is no abdominal tenderness. There is no guarding or rebound. Musculoskeletal:      Cervical back: Normal range of motion and neck supple. Neurological:      Mental Status: She is alert and oriented to person, place, and time. This note was generated used a voice recognition software. Errors in voice recognition may have occurred. An electronic signature was used to authenticate this note.     --Marifer Sanchez MD

## 2022-10-05 ENCOUNTER — TELEPHONE (OUTPATIENT)
Dept: OTOLARYNGOLOGY | Facility: CLINIC | Age: 72
End: 2022-10-05

## 2022-10-05 DIAGNOSIS — H61.003: ICD-10-CM

## 2022-10-05 RX ORDER — TRIAMCINOLONE ACETONIDE 0.25 MG/G
CREAM TOPICAL 2 TIMES DAILY
Qty: 15 G | Refills: 0 | Status: SHIPPED | OUTPATIENT
Start: 2022-10-05

## 2022-10-05 NOTE — TELEPHONE ENCOUNTER
Patient called in requesting refill on triamcinolone (KENALOG) 0.025 % cream to [facility] [address]

## 2022-11-07 ENCOUNTER — OFFICE VISIT (OUTPATIENT)
Dept: OTOLARYNGOLOGY | Facility: CLINIC | Age: 72
End: 2022-11-07

## 2022-11-07 VITALS
TEMPERATURE: 97.4 F | HEIGHT: 69 IN | HEART RATE: 90 BPM | RESPIRATION RATE: 16 BRPM | WEIGHT: 164.4 LBS | OXYGEN SATURATION: 100 % | BODY MASS INDEX: 24.35 KG/M2

## 2022-11-07 DIAGNOSIS — H61.003: Primary | ICD-10-CM

## 2022-11-07 PROCEDURE — 99213 OFFICE O/P EST LOW 20 MIN: CPT | Performed by: EMERGENCY MEDICINE

## 2022-11-07 RX ORDER — EPINEPHRINE 0.3 MG/.3ML
INJECTION SUBCUTANEOUS
COMMUNITY
Start: 2022-10-19

## 2022-11-07 RX ORDER — MOMETASONE FUROATE 1 MG/G
1 CREAM TOPICAL DAILY
Qty: 15 G | Refills: 0 | Status: SHIPPED | OUTPATIENT
Start: 2022-11-07 | End: 2022-11-14

## 2022-11-07 RX ORDER — HYDROXYCHLOROQUINE SULFATE 200 MG/1
200 TABLET, FILM COATED ORAL DAILY
COMMUNITY
Start: 2022-11-03

## 2022-11-07 NOTE — PROGRESS NOTES
[facility] EAR NOSE & THROAT  [address], [address]  [place] [address]  Fax [phone]  Phone [phone]      Visit Type: FOLLOW UP   Chief Complaint   Patient presents with   • Chondrodermatitis nodularis chronica helicis, bilateral     Patient states that she is here today for a 1 year FU. She also states that she wants to talk about inspire. States that she needs a refill on cream for her ear.         HPI  She presents for a follow up evaluation. She has had no current complaints. The patient has not had complaints of: ear pain, ear drainage or change in hearing.     Past Medical History:   Diagnosis Date   • Allergic rhinitis    • Arthralgia    • Arthritis    • Family history of colonic polyps    • GERD (gastroesophageal reflux disease)    • Hammer toe    • History of adenomatous polyp of colon    • History of colon polyps    • Hyperlipidemia    • Nail avulsion, toe    • Schatzki's ring    • Sleep apnea     c-pap,    • Swelling    • UTI (urinary tract infection)    • Vaginal bleeding        Past Surgical History:   Procedure Laterality Date   • APPENDECTOMY     • COLONOSCOPY  03/10/2016    Two 4-6mm tubular adenomatous polyps in the descending colon and in the transverse colon; The examination was otherwise normal on direct and retroflexion views; Repeat 5 years   • COLONOSCOPY  03/15/2012    One 2-3mm mixed tubulo-hyperplastic polyp in the cecum; One 6mm mixed tubulo-hyperplastic polyp in the hepatic flexure; One 6mm tubular adenomatous polyp in the transverse colon; One 6mm mixed tubulo-hyperplastic polyp in the descending colon; One 6mm hyperplastic polyp in the sigmoid colon; Fiver less than 6mm hyperplastic polyps in the rectum; Repeat 4 years   • COLONOSCOPY  02/05/2007    Diverticulosis; One 6-7mm hyperplastic polyp in the ascending colon; Repeat 5 years   • COLONOSCOPY N/A 4/29/2021    Procedure: COLONOSCOPY  WITH ANESTHESIA;  Surgeon: Xochitl Reyes MD;  Location: [facility] ENDOSCOPY;  Service: Gastroenterology;  Laterality: N/A;  pre: hx polyps  post: polyps  Lucy Art DO   • ENDOSCOPY N/A 5/12/2017    Non-obstructing Schatzki ring-dilated; Normal stomach; Normal examined duodenum; No specimens collected   • ENDOSCOPY  03/10/2016    Small HH; Low grade of narrowing Schatzki ring-dilated; Normal stomach; Normal examined duodenum; No specimens collected   • ENDOSCOPY  03/15/2012    HH; Schatzki ring-dilated to 19mm   • ENDOSCOPY  08/05/2005    Normal endoscopy; GERD by history    • ENDOSCOPY N/A 4/29/2021    Procedure: ESOPHAGOGASTRODUODENOSCOPY WITH ANESTHESIA;  Surgeon: Xochitl Reyes MD;  Location: [facility] ENDOSCOPY;  Service: Gastroenterology;  Laterality: N/A;  pre: dysphagia  post:hiatal hernia. schatzki's ring. dilated with balloon.  Lucy Art DO   • ENDOSCOPY N/A 7/14/2022    Procedure: ESOPHAGOGASTRODUODENOSCOPY WITH ANESTHESIA;  Surgeon: Xochitl Reyes MD;  Location: [facility] ENDOSCOPY;  Service: Gastroenterology;  Laterality: N/A;  pre: dysphagia  post: schatzki's ring. balloon dilation.   Lucy Art DO       • HAMMER TOE REPAIR Right 11/6/2018    Procedure: HAMMERTOE REPAIR WITH PINNING 2nd DIGIT, CARTIVA IMPLANT;  Surgeon: Pk East DPM;  Location: [facility] OR;  Service: Podiatry   • HYSTERECTOMY     • KNEE ARTHROSCOPY W/ MENISCAL REPAIR Left    • PATENT FORAMEN OVALE CLOSURE  2014   • TOE NAIL AVULSION Right 11/6/2018    Procedure: TOE NAIL AVULSION WITH NAIL BIOPSY- RIGHT FOOT;  Surgeon: Pk East DPM;  Location: [facility] OR;  Service: Podiatry   • TONSILLECTOMY         Family History: Her family history includes Colon polyps in her mother; Heart disease in her father and mother; Leukemia in her sister; Rectal cancer in her maternal grandfather; Thyroid cancer in her sister.     Social History: She  reports that she quit smoking about 16 years ago. Her  smoking use included cigarettes. She has never used smokeless tobacco. She reports current alcohol use. She reports that she does not use drugs.    Home Medications:  Apoaequorin, Azelastine HCl, EPINEPHrine, Lifitegrast, Specialty Vitamins Products, buPROPion XL, citalopram, cyclobenzaprine, fluticasone, hydroxychloroquine, levocetirizine, mometasone, montelukast, multivitamin with minerals, pantoprazole, pramipexole, traZODone, triamcinolone, and vitamin D    Allergies:  She has No Known Allergies.       Vital Signs:   Temp:  [97.4 °F (36.3 °C)] 97.4 °F (36.3 °C)  Heart Rate:  [90] 90  Resp:  [16] 16  ENT Physical Exam  Ear  Hearing: intact;  Auricles: right auricle normal; left auricle normal;  External Mastoids: right external mastoid normal; left external mastoid normal;  Ear Canals: right ear canal normal; left ear canal normal;  Tympanic Membranes: right tympanic membrane normal; left tympanic membrane normal;         Result Review    RESULTS REVIEW    I have reviewed the patients old records in the chart.     Assessment & Plan    Diagnoses and all orders for this visit:    1. Chondrodermatitis nodularis chronica helicis, bilateral (Primary)    Other orders  -     mometasone (ELOCON) 0.1 % cream; Apply 1 application topically to the appropriate area as directed Daily for 7 days. Apply to affected ear daily  Dispense: 15 g; Refill: 0       Continue current management plan.    Patient requests to be evaluated for Inspire.  I explained Dr. Morgan does not do this procedure but Dr. Naik does.  An appointment was made for her to see Dr. Naik.    Return if symptoms worsen or fail to improve.      Lucy Padilla, APRN  11/07/22  09:23 CST

## 2022-11-09 NOTE — H&P (VIEW-ONLY)
YOB: 1950  Location: [facility]  Location Address: [address] [place] [address], [facility], [address] [place] [address]  Location Phone: [phone]    Chief Complaint   Patient presents with   • Sleep Apnea       History of Present Illness  Earline GOINS is a 72 y.o. female.  Earline GOINS is here for evaluation of ENT complaints. The patient has had problems with sleep apnea, daytime somnolence, snoring, and disrupted sleep   Patient has tried cpap in the past. She states she has tried for the past 2 years unsuccessfully.   She has tried multiple different masks unsuccessfully.   At this time she is not wearing cpap as she was only getting 1 - 2 hours with the machine on     EPWORTH: 12     20 AHI 43.7    20 AHI 51    BMI 24.37        Past Medical History:   Diagnosis Date   • Allergic rhinitis    • Arthralgia    • Arthritis    • Family history of colonic polyps    • GERD (gastroesophageal reflux disease)    • Hammer toe    • History of adenomatous polyp of colon    • History of colon polyps    • Hyperlipidemia    • Nail avulsion, toe    • Schatzki's ring    • Sinusitis I don't know   • Sleep apnea     c-pap,    • Swelling    • UTI (urinary tract infection)    • Vaginal bleeding        Past Surgical History:   Procedure Laterality Date   • APPENDECTOMY     • COLONOSCOPY  03/10/2016    Two 4-6mm tubular adenomatous polyps in the descending colon and in the transverse colon; The examination was otherwise normal on direct and retroflexion views; Repeat 5 years   • COLONOSCOPY  03/15/2012    One 2-3mm mixed tubulo-hyperplastic polyp in the cecum; One 6mm mixed tubulo-hyperplastic polyp in the hepatic flexure; One 6mm tubular adenomatous polyp in the transverse colon; One 6mm mixed tubulo-hyperplastic polyp in the descending colon; One 6mm hyperplastic polyp in the sigmoid colon; Fiver less than 6mm hyperplastic polyps in the rectum; Repeat 4 years   • COLONOSCOPY  2007    Diverticulosis; One  6-7mm hyperplastic polyp in the ascending colon; Repeat 5 years   • COLONOSCOPY N/A 4/29/2021    Procedure: COLONOSCOPY WITH ANESTHESIA;  Surgeon: Xochitl Reyes MD;  Location: [facility] ENDOSCOPY;  Service: Gastroenterology;  Laterality: N/A;  pre: hx polyps  post: polyps  Lucy Art DO   • ENDOSCOPY N/A 5/12/2017    Non-obstructing Schatzki ring-dilated; Normal stomach; Normal examined duodenum; No specimens collected   • ENDOSCOPY  03/10/2016    Small HH; Low grade of narrowing Schatzki ring-dilated; Normal stomach; Normal examined duodenum; No specimens collected   • ENDOSCOPY  03/15/2012    HH; Schatzki ring-dilated to 19mm   • ENDOSCOPY  08/05/2005    Normal endoscopy; GERD by history    • ENDOSCOPY N/A 4/29/2021    Procedure: ESOPHAGOGASTRODUODENOSCOPY WITH ANESTHESIA;  Surgeon: Xochitl Reyes MD;  Location: [facility] ENDOSCOPY;  Service: Gastroenterology;  Laterality: N/A;  pre: dysphagia  post:hiatal hernia. schatzki's ring. dilated with balloon.  Lucy Art DO   • ENDOSCOPY N/A 7/14/2022    Procedure: ESOPHAGOGASTRODUODENOSCOPY WITH ANESTHESIA;  Surgeon: Xochitl Reyes MD;  Location: [facility] ENDOSCOPY;  Service: Gastroenterology;  Laterality: N/A;  pre: dysphagia  post: schatzki's ring. balloon dilation.   Lucy Art DO       • HAMMER TOE REPAIR Right 11/6/2018    Procedure: HAMMERTOE REPAIR WITH PINNING 2nd DIGIT, CARTIVA IMPLANT;  Surgeon: Pk East DPM;  Location: [facility] OR;  Service: Podiatry   • HYSTERECTOMY     • KNEE ARTHROSCOPY W/ MENISCAL REPAIR Left    • PATENT FORAMEN OVALE CLOSURE  2014   • TOE NAIL AVULSION Right 11/6/2018    Procedure: TOE NAIL AVULSION WITH NAIL BIOPSY- RIGHT FOOT;  Surgeon: Pk East DPM;  Location: [facility] OR;  Service: Podiatry   • TONSILLECTOMY         Outpatient Medications Marked as Taking for the 11/10/22 encounter (Office Visit) with Enoch Naik MD   Medication Sig Dispense Refill   • Azelastine HCl 137  MCG/SPRAY solution 2 sprays by Each Nare route Daily. 30 mL 11   • buPROPion XL (WELLBUTRIN XL) 300 MG 24 hr tablet Take 300 mg by mouth Every Morning.     • cyclobenzaprine (FLEXERIL) 10 MG tablet Take 1 tablet by mouth 3 (Three) Times a Day As Needed for Muscle Spasms for up to 12 doses. 12 tablet 0   • fluticasone (FLONASE) 50 MCG/ACT nasal spray 2 sprays into the nostril(s) as directed by provider Daily. 1 bottle 6   • hydroxychloroquine (PLAQUENIL) 200 MG tablet Take 1 tablet by mouth Daily.     • Lifitegrast (XIIDRA OP) Administer 1 drop to both eyes 2 (two) times a day.     • mometasone (ELOCON) 0.1 % cream Apply 1 application topically to the appropriate area as directed Daily for 7 days. Apply to affected ear daily 15 g 0   • montelukast (SINGULAIR) 10 MG tablet Take 10 mg by mouth Every Night.     • Multiple Vitamins-Minerals (CENTRUM SILVER 50+WOMEN PO) Take 1 tablet by mouth Daily.     • pantoprazole (PROTONIX) 40 MG EC tablet Take 1 tablet by mouth Daily.     • pramipexole (MIRAPEX) 1 MG tablet Take 1 mg by mouth 2 (two) times a day. Takes 1/2 tablet at a.m dose     • Specialty Vitamins Products (VITAMINS FOR HAIR PO) Take 1 capsule by mouth Daily. Hair growth support     • traZODone (DESYREL) 50 MG tablet Take 50 mg by mouth As Needed.     • triamcinolone (KENALOG) 0.025 % cream Apply  topically to the appropriate area as directed 2 (Two) Times a Day. 15 g 0   • vitamin D (ERGOCALCIFEROL) 1.25 MG (91688 UT) capsule capsule Take 50,000 Units by mouth 1 (One) Time Per Week.         Patient has no known allergies.    Family History   Problem Relation Age of Onset   • Colon polyps Mother         Unknown age    • Heart disease Mother    • Cancer Mother         Breast Cancer   • Diabetes Mother    • Heart failure Mother         open heart surgery   • Hypertension Mother         Pulmonary Hypertension   • Stroke Mother         while having heart cath   • Heart disease Father    • Cancer Father         Skin  Cancer   • Heart failure Father         open heart surgery   • Stroke Father    • Thyroid cancer Sister    • Leukemia Sister    • Osteoarthritis Maternal Grandmother    • Rectal cancer Maternal Grandfather         In late 70's/early 80's    • Cancer Maternal Grandfather         Rectal Cancer   • Cancer Paternal Grandmother         Skin Cancer   • Osteoarthritis Maternal Aunt         Aunt   • Colon cancer Neg Hx    • Esophageal cancer Neg Hx    • Liver cancer Neg Hx    • Liver disease Neg Hx    • Stomach cancer Neg Hx        Social History     Socioeconomic History   • Marital status:    Tobacco Use   • Smoking status: Former     Packs/day: 1.50     Years: 38.00     Pack years: 57.00     Types: Cigarettes     Start date: 3/1/1984     Quit date: 2006     Years since quittin.3   • Smokeless tobacco: Never   • Tobacco comments:     not sure when I started or quite but dates are close   Vaping Use   • Vaping Use: Never used   Substance and Sexual Activity   • Alcohol use: Yes     Alcohol/week: 2.0 - 3.0 standard drinks     Types: 2 - 3 Glasses of wine per week     Comment: Occasionally    • Drug use: No   • Sexual activity: Defer       Review of Systems   Constitutional: Positive for fatigue.   HENT:        Admits snoring     Psychiatric/Behavioral: Positive for sleep disturbance.       Vitals:    11/10/22 0931   BP: 123/70   Pulse: 91   Resp: 16   Temp: 96.9 °F (36.1 °C)       Body mass index is 24.37 kg/m².    Objective     Physical Exam  Vitals reviewed.   Constitutional:       Appearance: Normal appearance. She is normal weight.   HENT:      Head: Normocephalic.      Right Ear: Hearing, tympanic membrane, ear canal and external ear normal.      Left Ear: Hearing, tympanic membrane, ear canal and external ear normal.      Nose: Nose normal.      Mouth/Throat:      Lips: Pink.      Mouth: Mucous membranes are moist.      Pharynx: Uvula midline.      Comments: Tonsils surgically absent     Sherwood  II  Musculoskeletal:      Cervical back: Full passive range of motion without pain and normal range of motion.   Neurological:      Mental Status: She is alert.         Assessment & Plan   Diagnoses and all orders for this visit:    1. Obstructive sleep apnea syndrome (Primary)  -     Polysomnography 4 or More Parameters; Future  -     Case Request; Standing  -     Basic Metabolic Panel; Future  -     CBC (No Diff); Future  -     ECG 12 Lead; Future  -     XR Chest 1 View; Future  -     Case Request    2. Daytime somnolence  -     Polysomnography 4 or More Parameters; Future  -     Case Request; Standing  -     Basic Metabolic Panel; Future  -     CBC (No Diff); Future  -     ECG 12 Lead; Future  -     XR Chest 1 View; Future  -     Case Request    3. Snoring  -     Polysomnography 4 or More Parameters; Future  -     Case Request; Standing  -     Basic Metabolic Panel; Future  -     CBC (No Diff); Future  -     ECG 12 Lead; Future  -     XR Chest 1 View; Future  -     Case Request    4. Sleep disturbance  -     Polysomnography 4 or More Parameters; Future  -     Case Request; Standing  -     Basic Metabolic Panel; Future  -     CBC (No Diff); Future  -     ECG 12 Lead; Future  -     XR Chest 1 View; Future  -     Case Request    Other orders  -     Follow Anesthesia Guidelines / Protocol; Future  -     Obtain Informed Consent  -     Follow Anesthesia Guidelines / Protocol; Standing  -     Verify NPO Status; Standing  -     Obtain Informed Consent; Standing  -     SCD (Sequential Compression Device) - To Be Placed on Patient in Pre-Op; Standing  -     NPO Diet NPO Type: Sips with Meds; Standing      Videosleep endoscopy (N/A)  Orders Placed This Encounter   Procedures   • XR Chest 1 View     Standing Status:   Future     Standing Expiration Date:   11/10/2023     Order Specific Question:   Reason for Exam:     Answer:   PRE OP   • Basic Metabolic Panel     Standing Status:   Future     Standing Expiration Date:    11/10/2023     Order Specific Question:   Release to patient     Answer:   Routine Release   • CBC (No Diff)     Standing Status:   Future     Standing Expiration Date:   11/10/2023     Order Specific Question:   Release to patient     Answer:   Routine Release   • Obtain Informed Consent     Order Specific Question:   Informed Consent Given For     Answer:   VIDEO SLEEP ENDOSCOPY   • ECG 12 Lead     Standing Status:   Future     Standing Expiration Date:   11/10/2023     Order Specific Question:   Reason for Exam:     Answer:   PRE OP   • Polysomnography 4 or More Parameters     Standing Status:   Future     Standing Expiration Date:   11/10/2023     Order Specific Question:   May take own meds     Answer:   Yes     Order Specific Question:   Details     Answer:   O2 Implementation per Protocol     Order Specific Question:   Release to patient     Answer:   Routine Release     Return in about 3 months (around 2/10/2023) for Recheck.     Risks vs benefits of video sleep endoscopy discussed  Patient wishes to proceed   Patient will be scheduled for repeat sleep study  There are no Patient Instructions on file for this visit.

## 2022-11-09 NOTE — PROGRESS NOTES
YOB: 1950  Location: [facility]  Location Address: [address] [place] [address], [facility], [address] [place] [address]  Location Phone: [phone]    Chief Complaint   Patient presents with   • Sleep Apnea       History of Present Illness  Earline GOINS is a 72 y.o. female.  Earline GOINS is here for evaluation of ENT complaints. The patient has had problems with sleep apnea, daytime somnolence, snoring, and disrupted sleep   Patient has tried cpap in the past. She states she has tried for the past 2 years unsuccessfully.   She has tried multiple different masks unsuccessfully.   At this time she is not wearing cpap as she was only getting 1 - 2 hours with the machine on     EPWORTH: 12     20 AHI 43.7    20 AHI 51    BMI 24.37        Past Medical History:   Diagnosis Date   • Allergic rhinitis    • Arthralgia    • Arthritis    • Family history of colonic polyps    • GERD (gastroesophageal reflux disease)    • Hammer toe    • History of adenomatous polyp of colon    • History of colon polyps    • Hyperlipidemia    • Nail avulsion, toe    • Schatzki's ring    • Sinusitis I don't know   • Sleep apnea     c-pap,    • Swelling    • UTI (urinary tract infection)    • Vaginal bleeding        Past Surgical History:   Procedure Laterality Date   • APPENDECTOMY     • COLONOSCOPY  03/10/2016    Two 4-6mm tubular adenomatous polyps in the descending colon and in the transverse colon; The examination was otherwise normal on direct and retroflexion views; Repeat 5 years   • COLONOSCOPY  03/15/2012    One 2-3mm mixed tubulo-hyperplastic polyp in the cecum; One 6mm mixed tubulo-hyperplastic polyp in the hepatic flexure; One 6mm tubular adenomatous polyp in the transverse colon; One 6mm mixed tubulo-hyperplastic polyp in the descending colon; One 6mm hyperplastic polyp in the sigmoid colon; Fiver less than 6mm hyperplastic polyps in the rectum; Repeat 4 years   • COLONOSCOPY  2007    Diverticulosis; One  6-7mm hyperplastic polyp in the ascending colon; Repeat 5 years   • COLONOSCOPY N/A 4/29/2021    Procedure: COLONOSCOPY WITH ANESTHESIA;  Surgeon: Xochitl Reyes MD;  Location: [facility] ENDOSCOPY;  Service: Gastroenterology;  Laterality: N/A;  pre: hx polyps  post: polyps  Lucy Art DO   • ENDOSCOPY N/A 5/12/2017    Non-obstructing Schatzki ring-dilated; Normal stomach; Normal examined duodenum; No specimens collected   • ENDOSCOPY  03/10/2016    Small HH; Low grade of narrowing Schatzki ring-dilated; Normal stomach; Normal examined duodenum; No specimens collected   • ENDOSCOPY  03/15/2012    HH; Schatzki ring-dilated to 19mm   • ENDOSCOPY  08/05/2005    Normal endoscopy; GERD by history    • ENDOSCOPY N/A 4/29/2021    Procedure: ESOPHAGOGASTRODUODENOSCOPY WITH ANESTHESIA;  Surgeon: Xochitl Reyes MD;  Location: [facility] ENDOSCOPY;  Service: Gastroenterology;  Laterality: N/A;  pre: dysphagia  post:hiatal hernia. schatzki's ring. dilated with balloon.  Lucy Art DO   • ENDOSCOPY N/A 7/14/2022    Procedure: ESOPHAGOGASTRODUODENOSCOPY WITH ANESTHESIA;  Surgeon: Xochitl Reyes MD;  Location: [facility] ENDOSCOPY;  Service: Gastroenterology;  Laterality: N/A;  pre: dysphagia  post: schatzki's ring. balloon dilation.   Lucy Art DO       • HAMMER TOE REPAIR Right 11/6/2018    Procedure: HAMMERTOE REPAIR WITH PINNING 2nd DIGIT, CARTIVA IMPLANT;  Surgeon: Pk East DPM;  Location: [facility] OR;  Service: Podiatry   • HYSTERECTOMY     • KNEE ARTHROSCOPY W/ MENISCAL REPAIR Left    • PATENT FORAMEN OVALE CLOSURE  2014   • TOE NAIL AVULSION Right 11/6/2018    Procedure: TOE NAIL AVULSION WITH NAIL BIOPSY- RIGHT FOOT;  Surgeon: Pk East DPM;  Location: [facility] OR;  Service: Podiatry   • TONSILLECTOMY         Outpatient Medications Marked as Taking for the 11/10/22 encounter (Office Visit) with Enoch Naik MD   Medication Sig Dispense Refill   • Azelastine HCl 137  MCG/SPRAY solution 2 sprays by Each Nare route Daily. 30 mL 11   • buPROPion XL (WELLBUTRIN XL) 300 MG 24 hr tablet Take 300 mg by mouth Every Morning.     • cyclobenzaprine (FLEXERIL) 10 MG tablet Take 1 tablet by mouth 3 (Three) Times a Day As Needed for Muscle Spasms for up to 12 doses. 12 tablet 0   • fluticasone (FLONASE) 50 MCG/ACT nasal spray 2 sprays into the nostril(s) as directed by provider Daily. 1 bottle 6   • hydroxychloroquine (PLAQUENIL) 200 MG tablet Take 1 tablet by mouth Daily.     • Lifitegrast (XIIDRA OP) Administer 1 drop to both eyes 2 (two) times a day.     • mometasone (ELOCON) 0.1 % cream Apply 1 application topically to the appropriate area as directed Daily for 7 days. Apply to affected ear daily 15 g 0   • montelukast (SINGULAIR) 10 MG tablet Take 10 mg by mouth Every Night.     • Multiple Vitamins-Minerals (CENTRUM SILVER 50+WOMEN PO) Take 1 tablet by mouth Daily.     • pantoprazole (PROTONIX) 40 MG EC tablet Take 1 tablet by mouth Daily.     • pramipexole (MIRAPEX) 1 MG tablet Take 1 mg by mouth 2 (two) times a day. Takes 1/2 tablet at a.m dose     • Specialty Vitamins Products (VITAMINS FOR HAIR PO) Take 1 capsule by mouth Daily. Hair growth support     • traZODone (DESYREL) 50 MG tablet Take 50 mg by mouth As Needed.     • triamcinolone (KENALOG) 0.025 % cream Apply  topically to the appropriate area as directed 2 (Two) Times a Day. 15 g 0   • vitamin D (ERGOCALCIFEROL) 1.25 MG (86204 UT) capsule capsule Take 50,000 Units by mouth 1 (One) Time Per Week.         Patient has no known allergies.    Family History   Problem Relation Age of Onset   • Colon polyps Mother         Unknown age    • Heart disease Mother    • Cancer Mother         Breast Cancer   • Diabetes Mother    • Heart failure Mother         open heart surgery   • Hypertension Mother         Pulmonary Hypertension   • Stroke Mother         while having heart cath   • Heart disease Father    • Cancer Father         Skin  Cancer   • Heart failure Father         open heart surgery   • Stroke Father    • Thyroid cancer Sister    • Leukemia Sister    • Osteoarthritis Maternal Grandmother    • Rectal cancer Maternal Grandfather         In late 70's/early 80's    • Cancer Maternal Grandfather         Rectal Cancer   • Cancer Paternal Grandmother         Skin Cancer   • Osteoarthritis Maternal Aunt         Aunt   • Colon cancer Neg Hx    • Esophageal cancer Neg Hx    • Liver cancer Neg Hx    • Liver disease Neg Hx    • Stomach cancer Neg Hx        Social History     Socioeconomic History   • Marital status:    Tobacco Use   • Smoking status: Former     Packs/day: 1.50     Years: 38.00     Pack years: 57.00     Types: Cigarettes     Start date: 3/1/1984     Quit date: 2006     Years since quittin.3   • Smokeless tobacco: Never   • Tobacco comments:     not sure when I started or quite but dates are close   Vaping Use   • Vaping Use: Never used   Substance and Sexual Activity   • Alcohol use: Yes     Alcohol/week: 2.0 - 3.0 standard drinks     Types: 2 - 3 Glasses of wine per week     Comment: Occasionally    • Drug use: No   • Sexual activity: Defer       Review of Systems   Constitutional: Positive for fatigue.   HENT:        Admits snoring     Psychiatric/Behavioral: Positive for sleep disturbance.       Vitals:    11/10/22 0931   BP: 123/70   Pulse: 91   Resp: 16   Temp: 96.9 °F (36.1 °C)       Body mass index is 24.37 kg/m².    Objective     Physical Exam  Vitals reviewed.   Constitutional:       Appearance: Normal appearance. She is normal weight.   HENT:      Head: Normocephalic.      Right Ear: Hearing, tympanic membrane, ear canal and external ear normal.      Left Ear: Hearing, tympanic membrane, ear canal and external ear normal.      Nose: Nose normal.      Mouth/Throat:      Lips: Pink.      Mouth: Mucous membranes are moist.      Pharynx: Uvula midline.      Comments: Tonsils surgically absent     Sherwood  II  Musculoskeletal:      Cervical back: Full passive range of motion without pain and normal range of motion.   Neurological:      Mental Status: She is alert.         Assessment & Plan   Diagnoses and all orders for this visit:    1. Obstructive sleep apnea syndrome (Primary)  -     Polysomnography 4 or More Parameters; Future  -     Case Request; Standing  -     Basic Metabolic Panel; Future  -     CBC (No Diff); Future  -     ECG 12 Lead; Future  -     XR Chest 1 View; Future  -     Case Request    2. Daytime somnolence  -     Polysomnography 4 or More Parameters; Future  -     Case Request; Standing  -     Basic Metabolic Panel; Future  -     CBC (No Diff); Future  -     ECG 12 Lead; Future  -     XR Chest 1 View; Future  -     Case Request    3. Snoring  -     Polysomnography 4 or More Parameters; Future  -     Case Request; Standing  -     Basic Metabolic Panel; Future  -     CBC (No Diff); Future  -     ECG 12 Lead; Future  -     XR Chest 1 View; Future  -     Case Request    4. Sleep disturbance  -     Polysomnography 4 or More Parameters; Future  -     Case Request; Standing  -     Basic Metabolic Panel; Future  -     CBC (No Diff); Future  -     ECG 12 Lead; Future  -     XR Chest 1 View; Future  -     Case Request    Other orders  -     Follow Anesthesia Guidelines / Protocol; Future  -     Obtain Informed Consent  -     Follow Anesthesia Guidelines / Protocol; Standing  -     Verify NPO Status; Standing  -     Obtain Informed Consent; Standing  -     SCD (Sequential Compression Device) - To Be Placed on Patient in Pre-Op; Standing  -     NPO Diet NPO Type: Sips with Meds; Standing      Videosleep endoscopy (N/A)  Orders Placed This Encounter   Procedures   • XR Chest 1 View     Standing Status:   Future     Standing Expiration Date:   11/10/2023     Order Specific Question:   Reason for Exam:     Answer:   PRE OP   • Basic Metabolic Panel     Standing Status:   Future     Standing Expiration Date:    11/10/2023     Order Specific Question:   Release to patient     Answer:   Routine Release   • CBC (No Diff)     Standing Status:   Future     Standing Expiration Date:   11/10/2023     Order Specific Question:   Release to patient     Answer:   Routine Release   • Obtain Informed Consent     Order Specific Question:   Informed Consent Given For     Answer:   VIDEO SLEEP ENDOSCOPY   • ECG 12 Lead     Standing Status:   Future     Standing Expiration Date:   11/10/2023     Order Specific Question:   Reason for Exam:     Answer:   PRE OP   • Polysomnography 4 or More Parameters     Standing Status:   Future     Standing Expiration Date:   11/10/2023     Order Specific Question:   May take own meds     Answer:   Yes     Order Specific Question:   Details     Answer:   O2 Implementation per Protocol     Order Specific Question:   Release to patient     Answer:   Routine Release     Return in about 3 months (around 2/10/2023) for Recheck.     Risks vs benefits of video sleep endoscopy discussed  Patient wishes to proceed   Patient will be scheduled for repeat sleep study  There are no Patient Instructions on file for this visit.

## 2022-11-10 ENCOUNTER — TELEPHONE (OUTPATIENT)
Dept: OTOLARYNGOLOGY | Facility: CLINIC | Age: 72
End: 2022-11-10

## 2022-11-10 ENCOUNTER — OFFICE VISIT (OUTPATIENT)
Dept: OTOLARYNGOLOGY | Facility: CLINIC | Age: 72
End: 2022-11-10

## 2022-11-10 VITALS
WEIGHT: 165 LBS | BODY MASS INDEX: 24.44 KG/M2 | RESPIRATION RATE: 16 BRPM | HEIGHT: 69 IN | HEART RATE: 91 BPM | DIASTOLIC BLOOD PRESSURE: 70 MMHG | TEMPERATURE: 96.9 F | SYSTOLIC BLOOD PRESSURE: 123 MMHG

## 2022-11-10 DIAGNOSIS — G47.9 SLEEP DISTURBANCE: ICD-10-CM

## 2022-11-10 DIAGNOSIS — R40.0 DAYTIME SOMNOLENCE: ICD-10-CM

## 2022-11-10 DIAGNOSIS — R06.83 SNORING: ICD-10-CM

## 2022-11-10 DIAGNOSIS — G47.33 OBSTRUCTIVE SLEEP APNEA SYNDROME: Primary | ICD-10-CM

## 2022-11-10 PROCEDURE — 99214 OFFICE O/P EST MOD 30 MIN: CPT | Performed by: NURSE PRACTITIONER

## 2022-11-14 ENCOUNTER — PRE-ADMISSION TESTING (OUTPATIENT)
Dept: PREADMISSION TESTING | Facility: HOSPITAL | Age: 72
End: 2022-11-14

## 2022-11-14 ENCOUNTER — HOSPITAL ENCOUNTER (OUTPATIENT)
Dept: GENERAL RADIOLOGY | Facility: HOSPITAL | Age: 72
Discharge: HOME | End: 2022-11-14

## 2022-11-14 VITALS
BODY MASS INDEX: 23.73 KG/M2 | OXYGEN SATURATION: 98 % | SYSTOLIC BLOOD PRESSURE: 134 MMHG | WEIGHT: 169.53 LBS | HEART RATE: 76 BPM | RESPIRATION RATE: 16 BRPM | DIASTOLIC BLOOD PRESSURE: 53 MMHG | HEIGHT: 71 IN

## 2022-11-14 DIAGNOSIS — G47.33 OBSTRUCTIVE SLEEP APNEA SYNDROME: ICD-10-CM

## 2022-11-14 DIAGNOSIS — R06.83 SNORING: ICD-10-CM

## 2022-11-14 DIAGNOSIS — G47.9 SLEEP DISTURBANCE: ICD-10-CM

## 2022-11-14 DIAGNOSIS — R40.0 DAYTIME SOMNOLENCE: ICD-10-CM

## 2022-11-14 LAB
ANION GAP SERPL CALCULATED.3IONS-SCNC: 9 MMOL/L (ref 5–15)
BUN SERPL-MCNC: 24 MG/DL (ref 8–23)
BUN/CREAT SERPL: 27.3 (ref 7–25)
CALCIUM SPEC-SCNC: 9.5 MG/DL (ref 8.6–10.5)
CHLORIDE SERPL-SCNC: 101 MMOL/L (ref 98–107)
CO2 SERPL-SCNC: 28 MMOL/L (ref 22–29)
CREAT SERPL-MCNC: 0.88 MG/DL (ref 0.57–1)
DEPRECATED RDW RBC AUTO: 41.7 FL (ref 37–54)
EGFRCR SERPLBLD CKD-EPI 2021: 70.4 ML/MIN/1.73
ERYTHROCYTE [DISTWIDTH] IN BLOOD BY AUTOMATED COUNT: 12.1 % (ref 12.3–15.4)
GLUCOSE SERPL-MCNC: 103 MG/DL (ref 65–99)
HCT VFR BLD AUTO: 43.3 % (ref 34–46.6)
HGB BLD-MCNC: 14.2 G/DL (ref 12–15.9)
MCH RBC QN AUTO: 30.6 PG (ref 26.6–33)
MCHC RBC AUTO-ENTMCNC: 32.8 G/DL (ref 31.5–35.7)
MCV RBC AUTO: 93.3 FL (ref 79–97)
PLATELET # BLD AUTO: 232 10*3/MM3 (ref 140–450)
PMV BLD AUTO: 9.6 FL (ref 6–12)
POTASSIUM SERPL-SCNC: 3.8 MMOL/L (ref 3.5–5.2)
RBC # BLD AUTO: 4.64 10*6/MM3 (ref 3.77–5.28)
SODIUM SERPL-SCNC: 138 MMOL/L (ref 136–145)
WBC NRBC COR # BLD: 5.53 10*3/MM3 (ref 3.4–10.8)

## 2022-11-14 PROCEDURE — 71045 X-RAY EXAM CHEST 1 VIEW: CPT

## 2022-11-14 PROCEDURE — 80048 BASIC METABOLIC PNL TOTAL CA: CPT

## 2022-11-14 PROCEDURE — 36415 COLL VENOUS BLD VENIPUNCTURE: CPT

## 2022-11-14 PROCEDURE — 93005 ELECTROCARDIOGRAM TRACING: CPT

## 2022-11-14 PROCEDURE — 85027 COMPLETE CBC AUTOMATED: CPT

## 2022-11-14 PROCEDURE — 93010 ELECTROCARDIOGRAM REPORT: CPT | Performed by: HOSPITALIST

## 2022-11-14 RX ORDER — TIOTROPIUM BROMIDE INHALATION SPRAY 3.12 UG/1
2 SPRAY, METERED RESPIRATORY (INHALATION) DAILY PRN
Status: ON HOLD | COMMUNITY
End: 2022-11-21

## 2022-11-14 RX ORDER — THIAMINE HCL 100 MG
1 TABLET ORAL DAILY
Status: ON HOLD | COMMUNITY
End: 2023-03-15

## 2022-11-14 NOTE — DISCHARGE INSTRUCTIONS
Before you come to the hospital        Arrival time: AS DIRECTED BY OFFICE     YOU MAY TAKE THE FOLLOWING MEDICATION(S) THE MORNING OF SURGERY WITH A SIP OF WATER: Flexeril (cyclobenzaprine)  if needed            ALL OTHER HOME MEDICATION CHECK WITH YOUR PHYSICIAN (especially if you are taking diabetes medicines or blood thinners)            Eating and drinking restrictions prior to scheduled arrival time    2 Hours before arrival time STOP   Drinking Clear liquids (water, apple juice-no pulp)     6 Hours before arrival time STOP   Milk or drinks that contain milk, full liquids    6 Hours before arrival time STOP   Light meals or foods, such as toast or cereal    8 Hours before arrival time STOP   Heavy foods, such as meat, fried foods, or fatty foods    (It is extremely important that you follow these guidelines to prevent delay or cancelation of your procedure)     Clear Liquids  Water and flavored water                                                                      Clear Fruit juices, such as cranberry juice and apple juice.  Black coffee (NO cream of any kind, including powdered).  Plain tea  Clear bouillon or broth.  Flavored gelatin.  Soda.  Gatorade or Powerade.  Full liquid examples  Juices that have pulp.  Frozen ice pops that contain fruit pieces.  Coffee with creamer  Milk.  Yogurt.                MANAGING PAIN AFTER SURGERY    We know you are probably wondering what your pain will be like after surgery.  Following surgery it is unrealistic to expect you will not have pain.   Pain is how our bodies let us know that something is wrong or cautions us to be careful.  That said, our goal is to make your pain tolerable.    Methods we may use to treat your pain include (oral or IV medications, PCAs, epidurals, nerve blocks, etc.)   While some procedures require IV pain medications for a short time after surgery, transitioning to pain medications by mouth allows for better management of pain.   Your nurse  will encourage you to take oral pain medications whenever possible.  IV medications work almost immediately, but only last a short while.  Taking medications by mouth allows for a more constant level of medication in your blood stream for a longer period of time.      Once your pain is out of control it is harder to get back under control.  It is important you are aware when your next dose of pain medication is due.  If you are admitted, your nurse may write the time of your next dose on the white board in your room to help you remember.      We are interested in your pain and encourage you to inform us about aggravating factors during your visit.   Many times a simple repositioning every few hours can make a big difference.    If your physician says it is okay, do not let your pain prevent you from getting out of bed. Be sure to call your nurse for assistance prior to getting up so you do not fall.      Before surgery, please decide your tolerable pain goal.  These faces help describe the pain ratings we use on a 0-10 scale.   Be prepared to tell us your goal and whether or not you take pain or anxiety medications at home.          Preparing for Surgery  Preparing for surgery is an important part of your care. It can make things go more smoothly and help you avoid complications. The steps leading up to surgery may vary among hospitals. Follow all instructions given to you by your health care providers. Ask questions if you do not understand something. Talk about any concerns that you have.  Here are some questions to consider asking before your surgery:  If my surgery is not an emergency (is elective), when would be the best time to have the surgery?  What arrangements do I need to make for work, home, or school?  What will my recovery be like? How long will it be before I can return to normal activities?  Will I need to prepare my home? Will I need to arrange care for me or my children?  Should I expect to have  pain after surgery? What are my pain management options? Are there nonmedical options that I can try for pain?  Tell a health care provider about:  Any allergies you have.  All medicines you are taking, including vitamins, herbs, eye drops, creams, and over-the-counter medicines.  Any problems you or family members have had with anesthetic medicines.  Any blood disorders you have.  Any surgeries you have had.  Any medical conditions you have.  Whether you are pregnant or may be pregnant.  What are the risks?  The risks and complications of surgery depend on the specific procedure that you have. Discuss all the risks with your health care providers before your surgery. Ask about common surgical complications, which may include:  Infection.  Bleeding or a need for blood replacement (transfusion).  Allergic reactions to medicines.  Damage to surrounding nerves, tissues, or structures.  A blood clot.  Scarring.  Failure of the surgery to correct the problem.  Follow these instructions before the procedure:  Several days or weeks before your procedure  You may have a physical exam by your primary health care provider to make sure it is safe for you to have surgery.  You may have testing. This may include a chest X-ray, blood and urine tests, electrocardiogram (ECG), or other testing.  Ask your health care provider about:  Changing or stopping your regular medicines. This is especially important if you are taking diabetes medicines or blood thinners.  Taking medicines such as aspirin and ibuprofen. These medicines can thin your blood. Do not take these medicines unless your health care provider tells you to take them.  Taking over-the-counter medicines, vitamins, herbs, and supplements.  Do not use any products that contain nicotine or tobacco, such as cigarettes and e-cigarettes. If you need help quitting, ask your health care provider.  Avoid alcohol.  Ask your health care provider if there are exercises you can do to  prepare for surgery.  Eat a healthy diet.   Plan to have someone take you home from the hospital or clinic.  Plan to have a responsible adult care for you for at least 24 hours after you leave the hospital or clinic. This is important.  The day before your procedure  You may be given antibiotic medicine to take by mouth to help prevent infection. Take it as told by your health care provider.  You may be asked to shower with a germ-killing soap.  Follow instructions from your health care provider about eating and drinking restrictions. This includes gum, mints and hard candy.  Pack comfortable clothes according to your procedure.   The day of your procedure  You may need to take another shower with a germ-killing soap before you leave home in the morning.  With a small sip of water, take only the medicines that you are told to take.  Remove all jewelry including rings.   Leave anything you consider valuable at home except hearing aids if needed.  Do not wear any makeup, nail polish, powder, deodorant, lotion, hair accessories, or anything on your skin or body except your clothes.  If you will be staying in the hospital, bring a case to hold your glasses, contacts, or dentures. You may also want to bring your robe and non-skid footwear.  If you wear oxygen at home, bring it with you the day of surgery.  If instructed by your health care provider, bring your sleep apnea device with you on the day of your surgery (if this applies to you).  You may want to leave your suitcase and sleep apnea device in the car until after surgery.   Arrive at the hospital as scheduled.  Bring a friend or family member with you who can help to answer questions and be present while you meet with your health care provider.  At the hospital  When you arrive at the hospital:  Go to registration located at the main entrance of the hospital. You will be registered and given a beeper and a sticker sheet. Take the stickers to the Outpatient nurses  desk and place in the black tray. This is to notify staff that you have arrived. Then return to the lobby to wait.   When your beeper lights up and vibrates proceed through the double doors, under the stairs, and a member of the Outpatient Surgery staff will escort you to your preoperative room.  You may have to wear compression sleeves. These help to prevent blood clots and reduce swelling in your legs.  An IV may be inserted into one of your veins.              In the operating room, you may be given one or more of the following:        A medicine to help you relax (sedative).        A medicine to numb the area (local anesthetic).        A medicine to make you fall asleep (general anesthetic).        A medicine that is injected into an area of your body to numb everything below the                      injection site (regional anesthetic).  You may be given an antibiotic through your IV to help prevent infection.  Your surgical site will be marked or identified.    Contact a health care provider if you:  Develop a fever of more than 100.4°F (38°C) or other feelings of illness during the 48 hours before your surgery.  Have symptoms that get worse.  Have questions or concerns about your surgery.  Summary  Preparing for surgery can make the procedure go more smoothly and lower your risk of complications.  Before surgery, make a list of questions and concerns to discuss with your surgeon. Ask about the risks and possible complications.  In the days or weeks before your surgery, follow all instructions from your health care provider. You may need to stop smoking, avoid alcohol, follow eating restrictions, and change or stop your regular medicines.  Contact your surgeon if you develop a fever or other signs of illness during the few days before your surgery.  This information is not intended to replace advice given to you by your health care provider. Make sure you discuss any questions you have with your health care  provider.  Document Revised: 12/21/2018 Document Reviewed: 10/23/2018  Elsevier Patient Education © 2021 [employer]

## 2022-11-18 LAB
QT INTERVAL: 388 MS
QTC INTERVAL: 427 MS

## 2022-11-21 ENCOUNTER — ANESTHESIA EVENT (OUTPATIENT)
Dept: PERIOP | Facility: HOSPITAL | Age: 72
End: 2022-11-21

## 2022-11-21 ENCOUNTER — ANESTHESIA (OUTPATIENT)
Dept: PERIOP | Facility: HOSPITAL | Age: 72
End: 2022-11-21

## 2022-11-21 ENCOUNTER — HOSPITAL ENCOUNTER (OUTPATIENT)
Facility: HOSPITAL | Age: 72
Setting detail: HOSPITAL OUTPATIENT SURGERY
Discharge: HOME | End: 2022-11-21
Attending: OTOLARYNGOLOGY | Admitting: OTOLARYNGOLOGY
Payer: MEDICARE

## 2022-11-21 VITALS
OXYGEN SATURATION: 100 % | RESPIRATION RATE: 18 BRPM | TEMPERATURE: 96.4 F | HEART RATE: 69 BPM | SYSTOLIC BLOOD PRESSURE: 129 MMHG | DIASTOLIC BLOOD PRESSURE: 55 MMHG

## 2022-11-21 PROCEDURE — 25010000002 PROPOFOL 1000 MG/100ML EMULSION: Performed by: NURSE ANESTHETIST, CERTIFIED REGISTERED

## 2022-11-21 PROCEDURE — 42975 DISE EVAL SLP DO BRTH FLX DX: CPT | Performed by: OTOLARYNGOLOGY

## 2022-11-21 RX ORDER — SODIUM CHLORIDE, SODIUM LACTATE, POTASSIUM CHLORIDE, CALCIUM CHLORIDE 600; 310; 30; 20 MG/100ML; MG/100ML; MG/100ML; MG/100ML
1000 INJECTION, SOLUTION INTRAVENOUS CONTINUOUS
Status: DISCONTINUED | OUTPATIENT
Start: 2022-11-21 | End: 2022-11-21 | Stop reason: HOSPADM

## 2022-11-21 RX ORDER — SODIUM CHLORIDE 0.9 % (FLUSH) 0.9 %
3 SYRINGE (ML) INJECTION EVERY 12 HOURS SCHEDULED
Status: DISCONTINUED | OUTPATIENT
Start: 2022-11-21 | End: 2022-11-21 | Stop reason: HOSPADM

## 2022-11-21 RX ORDER — LIDOCAINE HYDROCHLORIDE 10 MG/ML
0.5 INJECTION, SOLUTION EPIDURAL; INFILTRATION; INTRACAUDAL; PERINEURAL ONCE AS NEEDED
Status: DISCONTINUED | OUTPATIENT
Start: 2022-11-21 | End: 2022-11-21 | Stop reason: HOSPADM

## 2022-11-21 RX ORDER — PROPOFOL 10 MG/ML
INJECTION, EMULSION INTRAVENOUS AS NEEDED
Status: DISCONTINUED | OUTPATIENT
Start: 2022-11-21 | End: 2022-11-21 | Stop reason: SURG

## 2022-11-21 RX ORDER — SODIUM CHLORIDE 0.9 % (FLUSH) 0.9 %
3-10 SYRINGE (ML) INJECTION AS NEEDED
Status: DISCONTINUED | OUTPATIENT
Start: 2022-11-21 | End: 2022-11-21 | Stop reason: HOSPADM

## 2022-11-21 RX ORDER — OXYCODONE AND ACETAMINOPHEN 7.5; 325 MG/1; MG/1
2 TABLET ORAL EVERY 4 HOURS PRN
Status: DISCONTINUED | OUTPATIENT
Start: 2022-11-21 | End: 2022-11-21 | Stop reason: HOSPADM

## 2022-11-21 RX ORDER — NALOXONE HCL 0.4 MG/ML
0.4 VIAL (ML) INJECTION AS NEEDED
Status: DISCONTINUED | OUTPATIENT
Start: 2022-11-21 | End: 2022-11-21 | Stop reason: HOSPADM

## 2022-11-21 RX ORDER — SODIUM CHLORIDE, SODIUM LACTATE, POTASSIUM CHLORIDE, CALCIUM CHLORIDE 600; 310; 30; 20 MG/100ML; MG/100ML; MG/100ML; MG/100ML
100 INJECTION, SOLUTION INTRAVENOUS CONTINUOUS
Status: DISCONTINUED | OUTPATIENT
Start: 2022-11-21 | End: 2022-11-21 | Stop reason: HOSPADM

## 2022-11-21 RX ORDER — SODIUM CHLORIDE 0.9 % (FLUSH) 0.9 %
3 SYRINGE (ML) INJECTION AS NEEDED
Status: DISCONTINUED | OUTPATIENT
Start: 2022-11-21 | End: 2022-11-21 | Stop reason: HOSPADM

## 2022-11-21 RX ORDER — ONDANSETRON 4 MG/1
4 TABLET, FILM COATED ORAL ONCE AS NEEDED
Status: DISCONTINUED | OUTPATIENT
Start: 2022-11-21 | End: 2022-11-21 | Stop reason: HOSPADM

## 2022-11-21 RX ORDER — SODIUM CHLORIDE 9 MG/ML
40 INJECTION, SOLUTION INTRAVENOUS AS NEEDED
Status: DISCONTINUED | OUTPATIENT
Start: 2022-11-21 | End: 2022-11-21 | Stop reason: HOSPADM

## 2022-11-21 RX ORDER — OXYCODONE AND ACETAMINOPHEN 10; 325 MG/1; MG/1
1 TABLET ORAL ONCE AS NEEDED
Status: DISCONTINUED | OUTPATIENT
Start: 2022-11-21 | End: 2022-11-21 | Stop reason: HOSPADM

## 2022-11-21 RX ORDER — ONDANSETRON 2 MG/ML
4 INJECTION INTRAMUSCULAR; INTRAVENOUS ONCE AS NEEDED
Status: DISCONTINUED | OUTPATIENT
Start: 2022-11-21 | End: 2022-11-21 | Stop reason: HOSPADM

## 2022-11-21 RX ORDER — LIDOCAINE HYDROCHLORIDE 20 MG/ML
INJECTION, SOLUTION EPIDURAL; INFILTRATION; INTRACAUDAL; PERINEURAL AS NEEDED
Status: DISCONTINUED | OUTPATIENT
Start: 2022-11-21 | End: 2022-11-21 | Stop reason: SURG

## 2022-11-21 RX ORDER — DROPERIDOL 2.5 MG/ML
0.62 INJECTION, SOLUTION INTRAMUSCULAR; INTRAVENOUS ONCE AS NEEDED
Status: DISCONTINUED | OUTPATIENT
Start: 2022-11-21 | End: 2022-11-21 | Stop reason: HOSPADM

## 2022-11-21 RX ORDER — LABETALOL HYDROCHLORIDE 5 MG/ML
5 INJECTION, SOLUTION INTRAVENOUS
Status: DISCONTINUED | OUTPATIENT
Start: 2022-11-21 | End: 2022-11-21 | Stop reason: HOSPADM

## 2022-11-21 RX ORDER — FENTANYL CITRATE 50 UG/ML
25 INJECTION, SOLUTION INTRAMUSCULAR; INTRAVENOUS
Status: DISCONTINUED | OUTPATIENT
Start: 2022-11-21 | End: 2022-11-21 | Stop reason: HOSPADM

## 2022-11-21 RX ORDER — FLUMAZENIL 0.1 MG/ML
0.2 INJECTION INTRAVENOUS AS NEEDED
Status: DISCONTINUED | OUTPATIENT
Start: 2022-11-21 | End: 2022-11-21 | Stop reason: HOSPADM

## 2022-11-21 RX ADMIN — SODIUM CHLORIDE, POTASSIUM CHLORIDE, SODIUM LACTATE AND CALCIUM CHLORIDE 1000 ML: 600; 310; 30; 20 INJECTION, SOLUTION INTRAVENOUS at 07:56

## 2022-11-21 RX ADMIN — PROPOFOL 160 MG: 10 INJECTION, EMULSION INTRAVENOUS at 10:18

## 2022-11-21 RX ADMIN — LIDOCAINE HYDROCHLORIDE 60 MG: 20 INJECTION, SOLUTION EPIDURAL; INFILTRATION; INTRACAUDAL; PERINEURAL at 10:18

## 2022-11-21 NOTE — ANESTHESIA PREPROCEDURE EVALUATION
Anesthesia Evaluation     Patient summary reviewed and Nursing notes reviewed   no history of anesthetic complications:  NPO Solid Status: > 8 hours             Airway   Mallampati: I  TM distance: >3 FB  Neck ROM: full  No difficulty expected  Dental          Pulmonary    (+) a smoker Former, COPD, sleep apnea,   Cardiovascular   Exercise tolerance: good (4-7 METS)    (+) valvular problems/murmurs (PFO s/p closure), hyperlipidemia,       Neuro/Psych  (-) seizures, TIA, CVA  GI/Hepatic/Renal/Endo    (+)  GERD,    (-) liver disease, no renal disease, diabetes    Musculoskeletal     Abdominal    Substance History      OB/GYN          Other                          Anesthesia Plan    ASA 2     MAC     intravenous induction     Anesthetic plan, risks, benefits, and alternatives have been provided, discussed and informed consent has been obtained with: patient.        CODE STATUS:

## 2022-11-21 NOTE — ANESTHESIA POSTPROCEDURE EVALUATION
Patient: Earline GOINS    Procedure Summary     Date: 11/21/22 Room / Location:  [facility]  /  [facility]    Anesthesia Start: 1015 Anesthesia Stop: 1033    Procedure: Videosleep endoscopy Diagnosis:       Obstructive sleep apnea syndrome      Daytime somnolence      Snoring      Sleep disturbance      (Obstructive sleep apnea syndrome [G47.33])      (Daytime somnolence [R40.0])      (Snoring [R06.83])      (Sleep disturbance [G47.9])    Surgeons: Enoch Naik MD Provider: Elva Thomson CRNA    Anesthesia Type: MAC ASA Status: 2          Anesthesia Type: MAC    Vitals  Vitals Value Taken Time   /61 11/21/22 1046   Temp 96.4 °F (35.8 °C) 11/21/22 1032   Pulse 70 11/21/22 1053   Resp 20 11/21/22 1032   SpO2 100 % 11/21/22 1053   Vitals shown include unvalidated device data.        Post Anesthesia Care and Evaluation    Patient location during evaluation: PHASE II  Patient participation: complete - patient participated  Level of consciousness: awake and alert  Pain management: adequate    Airway patency: patent  Anesthetic complications: No anesthetic complications  PONV Status: none  Cardiovascular status: acceptable  Respiratory status: acceptable  Hydration status: acceptable

## 2022-11-21 NOTE — OP NOTE
.OPERATIVE NOTE  11/21/2022    NAME: Earline GOINS    YOB: 1950  MRN: [id number]    PRE-OPERATIVE DIAGNOSIS:    Obstructive sleep apnea syndrome [G47.33]  Daytime somnolence [R40.0]  Snoring [R06.83]  Sleep disturbance [G47.9]    POST-OPERATIVE DIAGNOSIS:   Post-Op Diagnosis Codes:     * Obstructive sleep apnea syndrome [G47.33]     * Daytime somnolence [R40.0]     * Snoring [R06.83]     * Sleep disturbance [G47.9]    PROCEDURE PERFORMED:   Drug-induced video sleep endoscopy    SURGEON:   Enoch Naik MD    ASSISTANT(S):   None    ANESTHESIA:   IV sedation    INDICATIONS: The patient is a 72 y.o. female with Obstructive sleep apnea syndrome [G47.33]  Daytime somnolence [R40.0]  Snoring [R06.83]  Sleep disturbance [G47.9]    PROCEDURE:  The patient was brought to the operating room, given IV sedation, and prepped and draped in the usual manner.     The flexible endoscope was inserted to examine both sides of the nose as well as the pharynx and larynx.     The VOTE score at baseline was nearly complete AP collapse with essentially minimal lateral collapse.  With simulated jaw advancement and tongue advancement, the hypopharyngeal obstruction and secondarily the palatal collapse also improved.  Nasolaryngoscopy demonstrated posteriorly displaced epiglottis secondary to moderate lymphoid hyperplasia and mild retroflexion.  Endolaryngeal examination demonstrated findings consistent with laryngopharyngeal reflux including an interarytenoid bar and moderate arytenoid edema.     In summary, there was no evidence of complete concentric palatal obstruction.      The patient was transported upon extubation to the postanesthesia care unit in stable condition.    SPECIMENS:  None    COMPLICATIONS: NONE    ESTIMATED BLOOD LOSS:  None    Enoch Naik MD  11/21/2022

## 2022-12-19 ENCOUNTER — TELEPHONE (OUTPATIENT)
Dept: OTOLARYNGOLOGY | Facility: CLINIC | Age: 72
End: 2022-12-19

## 2022-12-19 NOTE — TELEPHONE ENCOUNTER
Caller: Earline GOINS     Relationship: [unfilled] SELF     Best call back number: [phone]    What is your medical concern? PT IS SCHEDULED FOR SLEEP STUDY ON 01/16/2023. HER APPT WITH DR GUTHRIE IS 01/19/2023. PT STATED THAT SLEEP STUDY WILL ONLY BE READ ON THE WEEKEND (AFTER HER APPT WITH DR GUTHRIE). MY NEXT AVAILABLE APPT IS 02/02/2023. PT DID NOT WANT TO WAIT THAT LONG. PLEASE CALL TO PT AND ADVISE. THANK YOU.

## 2023-01-18 ENCOUNTER — HOSPITAL ENCOUNTER (OUTPATIENT)
Dept: SLEEP CENTER | Age: 73
Discharge: HOME | End: 2023-01-20
Payer: MEDICARE

## 2023-01-18 PROCEDURE — 95810 POLYSOM 6/> YRS 4/> PARAM: CPT

## 2023-01-19 NOTE — PROGRESS NOTES
[place] [address]  [address]  Phone [phone] Fax [phone]     Sleep Study Technician Review    Patient Name:  Lisa Noe  :   1950  Referring Provider: RM Garcia *    Brief History:  Lisa Noe is a 71 y.o. Female with a history of depression, GERDS, and RLS. The patient has had problems with sleep apnea, daytime somnolence, snoring, and disrupted sleep. Patient has tried CPAP in the past. She states she has tried for the past 2 years unsuccessfully. She has tried multiple different mask unsuccessfully. At this time she is not wearing CPAP as she was only getting 1-2 hours with the machine on. Height:  70 inches  Weight: 166 lbs  BMI: 23.93  Neck Circ: 13.5\"  Mallampati 4  ESS: 5    Type of Study: PSG  Time Stage Position Snore Hypopnea Obs Apnea Jarred Apnea PAP O2   2200 Awake Supine No No No No  RA   2300 Awake Supine No No No No  RA   2400 2  Supine No No No No  RA   0100 2 Supine No Yes No No  RA   0200 2 Supine No Yes No No  RA   0300 2 Supine No Yes No No  RA   0400 REM Supine No No No No  RA     Summary: Patient states she is interested in getting the inspire device. Patient states she forgot to take medication for RLS tonight and it was causing her to have trouble sleeping at the beginning of the night. DME: [employer]      The study was reviewed briefly with Lisa Noe. She will be notified of the formal results and recommendations after the study is scored and interpreted. The report will be sent to her referring provider.     Technician: Santiago WRIGHT [facility] RRT, RPSGT

## 2023-01-20 NOTE — PROGRESS NOTES
[place] [address]  [place], [address]  Phone [phone] Fax [phone]        Polysomnography Report  Patient Name Natalie Lopez. Efrain Yousif Account Number [de-identified]    1950 Referring Provider RM Taylor   Age/ Gender 71 years/F Interpreting physician Maria Ryan M.D., [facility]   Neck circumference/  Mallampati classification 13.5 in/class 4 Night Technician Marlin Locke, RPSGT  [place] [facility], RRT   Lee score 24 Scoring Technician Yesenia Seymour, CRT, RPSGT   Height 70.0 in Indications for the test excessive daytime somnolence   Weight 166.0 lbs Test Diagnostic Polysomnogram   BMI 23.8 Date of test 2023     Procedure  A Diagnostic Polysomnogram was conducted on the night of 2023. The study was performed and scored per AASM guidelines. The following were monitored: frontal, central, and occipital EEG, electrooculogram (EOG), submentalis EMG, nasal and oral airflow, intranasal pressure, thoracic plethysmography, abdominal plethysmography, anterior tibialis EMG, electrocardiogram, body position, and positive airway pressure (PAP). Arterial oxygen saturation was monitored with a pulse oximeter. The study was scored utilizing 30 second epochs. Hypopneas were scored using per AASM definition VIII, D, 1B.     Sleep Scoring Data  Lights out 9:27:04 PM Sleep latency 0.8 min Time in N1 85.5 min N1% 25.5%   Lights on 4:42:40 AM WASO 99.5 min Time in N2 228.8 min N2% 68.2%   TIB/.6 min Sleep efficiency 77.1% Time in N3 0.5 min N3% 0.1%   .3 min REM latency 181.5 min Time in R 20.5 min R% 6.1%     Respiratory Events Summary   NREM REM Total   Hypopnea index 21.2 32.2 22.9   Apnea index 0.2 2.9 0.5   RERA index 0.0 0.0 0.0   AHI 21.3 35.1 23.4   RDI (AHI + RERA index) 21.3 35.1 23.4     Respiratory Events by Sleep Stage   Obstructive Apneas OA Index Central Apneas CA Index Mixed Apneas MA Index   Hypopneas H Index RERAs   R Index   NREM 0 0.0 1 0.2 0 0.0 111 21.2 0 0.0   REM 1 2.9 0 0.0 0 0.0 11 32.2 0 0.0   Total 1 0.2 2 0.4 0 0.0 128 22.9 0 0.0     Respiratory Events by Body Position   Time (min) Obstructive Apneas OA Index Central Apneas CA Index Mixed Apneas MA Index   Hypopneas H Index   RERAs RERA  Index Total  AHI Total RDI   Supine 388.7  0 0.0 2 0.4 0 0.0 112 22.6 0 0.0 23.0 23.0   R/Supine                                           L/Supine                                           Right 39.8  1 1.6 0 0.0 0 0.0 16 24.9 0 0.0 26.5 26.5   Left                                           Prone                                           R/Prone                                           L/Prone                                           Up 0.1  0 0.0 0 0.0 0 0.0 0 0.0 0 0.0 0.0 0.0   Snoring  Snoring Rating (loudness 0-4) 1   Snoring Amount (% of total sleep time with snoring) 22.4%     Oximetry Data              Wake NREM REM TST   Average Saturation  93% 93% 93% 93%   Desaturation Index (#/hour)  15.4 26.3 17.0   Desaturation Max Duration (sec)  78.0 61.0 78.0   Minimum O2 Saturation    84%     Oximetry Distribution              WaKe REM NREM TOTAL %TIB   <90% (min) 3.3 0.8 35.7 39.8 9.1%   <85% (min) 0.0 0.0 0.0 0.0 0.0%   <80% (min) 0.0 0.0 0.0 0.0 0.0%   <75% (min) 0.0 0.0 0.0 0.0 0.0%   <70% (min) 0.0 0.0 0.0 0.0 0.0%   <60% (min) 0.0 0.0 0.0 0.0 0.0%   <50% (min) 0.0 0.0 0.0 0.0 0.0%   Fail    (min) 7.2 0.0 0.2 7.4      Respiratory Pattern   Present Absent Duration   Hypoventilation  ü N/A   Cheyne Rosado Respirations  ü N/A   Periodic Breathing  ü N/A     Heart Rate   Mean heart rate (bmp) Maximum heart rate (bmp)   During recording       88   During sleep 66.4 84     Heart Rhythm Summary  Normal sinus rhythm     Heart Rhythm Events  Type of events Present Absent Rate-Duration/Total Duration   Bradycardia  P N/A   Asystole  ü N/A   Sinus Tachycardia During Sleep  ü N/A   Narrow Complex Tachycardia  ü N/A   Wide Complex Tachycardia  ü N/A   Atrial Fibrillation  ü N/A     Other Arrhythmias  P N/A     Arousals   NREM REM Total Arousal Index   Spontaneous 34 3 43 7.7   Respiratory 101 9 117 20.9   Snore 4 1 7 1.3   Leg movement 84 6 92 16.5   Total 223 19 264 47.2     Periodic Limb Movement Data (legs unless otherwise noted)   Leg Movements PLMS PLMS with arousal   # of events 115 112 89   Index (#/hr TST) 20.6 20.0 15.9   Note:   The interpreting physician named above, reviewed this record in its entirety, including sleep staging, EMG activity, EEG, EKG, breathing parameters, oxygen saturation, body position, and behavior unless otherwise noted. The interpretation is based on this information in addition to available clinical history and physical examination data. Interpretation:     Moderate complex sleep apnea. Poor sleep efficiency and maintenance. Obesity. Subjective hypersomnia. Hypoxia during sleep. Recommendations:    PAP titration. Weight loss and exercise. Avoid risky activity such as driving if sleepy. Discuss sleep hygiene with the patient. Monitor PAP use and effectiveness.        Cara Allred MD, [facility], [facility]

## 2023-01-22 DIAGNOSIS — G47.31 COMPLEX SLEEP APNEA SYNDROME: Primary | ICD-10-CM

## 2023-01-27 ENCOUNTER — HOSPITAL ENCOUNTER (OUTPATIENT)
Dept: WOMENS IMAGING | Age: 73
Discharge: HOME | End: 2023-01-27
Payer: MEDICARE

## 2023-01-27 DIAGNOSIS — Z12.31 ENCOUNTER FOR SCREENING MAMMOGRAM FOR MALIGNANT NEOPLASM OF BREAST: ICD-10-CM

## 2023-01-27 DIAGNOSIS — Z78.0 ASYMPTOMATIC MENOPAUSAL STATE: ICD-10-CM

## 2023-01-27 PROCEDURE — 77067 SCR MAMMO BI INCL CAD: CPT

## 2023-01-27 PROCEDURE — 77080 DXA BONE DENSITY AXIAL: CPT

## 2023-01-30 NOTE — PROGRESS NOTES
OPERATIVE NOTE:    PROCEDURE:   Flexible Fiberoptic Laryngoscopy    ANESTHESIA:  None    REASON FOR PROCEDURE:  Procedure was recommend for suspicious clinical behavior unresponsive to medical management.  Risks, benefits and alternatives were discussed.      DETAILS of OPERATION:  The patient was seated in the exam chair.  A flexible fiberoptic laryngoscopy was performed through the oral cavity.  The scope was introduced into the oral cavity and directed to the level of the glottis, examining the structures of the oropharynx, base of tongue, vallecula, supraglottic larynx, glottic larynx, and hypopharynx.      FINDINGS:  Mucosal surfaces:   The mucosal surfaces demonstrated normal mucosa surfaces without inflammation.    Base of tongue:  The base of tongue was found to have no mass or lesion.    Epiglottis:  The epiglottis was found to have no mass or lesion.    Aryepligottic fold:  The AE folds were found to have no mass or lesion.    False Vocal Fold:  The false cords were found to have no mass or lesion.    True Vocal Cord:  The true vocal cords were found to have anterior erythema - no obvious mass or lesion noted today.    Arytenoid:   The arytenoids were found to have erythema    Hypopharynx:  The hypopharynx was found to have no mass or lesion.    The patient tolerated procedure well.             no

## 2023-02-20 ENCOUNTER — HOSPITAL ENCOUNTER (OUTPATIENT)
Dept: SLEEP CENTER | Age: 73
Discharge: HOME | End: 2023-02-22
Payer: MEDICARE

## 2023-02-20 PROCEDURE — 95811 POLYSOM 6/>YRS CPAP 4/> PARM: CPT

## 2023-02-20 NOTE — PROGRESS NOTES
YOB: 1950  Location: [facility]  Location Address: [address] [place] [address], [facility], [address] [place] [address]  Location Phone: [phone]    Chief Complaint   Patient presents with   • Sleep Apnea       History of Present Illness  Earline GOINS is a 72 y.o. female.  Earline GOINS is here for follow up of ENT complaints. The patient has had problems with sleep apnea, snoring and poor cpap tolerance   She is s/p videosleep endoscopy 2022  Patient has been trying cpap for the past year. She has tried multiple masks and settings with poor tolerance. She is unable to tolerate for more than 2 hours per night       23 Sleep Study AHI 23.4   EPWORTH:  7    EPWORTH 2023 12   Patient underwent titrated sleep study with cpap last night and states she wore the mask as much as possible   Past Medical History:   Diagnosis Date   • Allergic rhinitis    • Arthralgia    • Arthritis    • COPD (chronic obstructive pulmonary disease) (HCC)    • Family history of colonic polyps    • GERD (gastroesophageal reflux disease)    • Hammer toe    • History of adenomatous polyp of colon    • History of colon polyps    • Hyperlipidemia    • Nail avulsion, toe    • Psoriasis    • Schatzki's ring    • Sinusitis I don't know   • Sleep apnea     c-pap,    • Swelling    • UTI (urinary tract infection)    • Vaginal bleeding        Past Surgical History:   Procedure Laterality Date   • APPENDECTOMY     • COLONOSCOPY  03/10/2016    Two 4-6mm tubular adenomatous polyps in the descending colon and in the transverse colon; The examination was otherwise normal on direct and retroflexion views; Repeat 5 years   • COLONOSCOPY  03/15/2012    One 2-3mm mixed tubulo-hyperplastic polyp in the cecum; One 6mm mixed tubulo-hyperplastic polyp in the hepatic flexure; One 6mm tubular adenomatous polyp in the transverse colon; One 6mm mixed tubulo-hyperplastic polyp in the descending colon; One 6mm hyperplastic polyp in the sigmoid  colon; Fiver less than 6mm hyperplastic polyps in the rectum; Repeat 4 years   • COLONOSCOPY  02/05/2007    Diverticulosis; One 6-7mm hyperplastic polyp in the ascending colon; Repeat 5 years   • COLONOSCOPY N/A 4/29/2021    Procedure: COLONOSCOPY WITH ANESTHESIA;  Surgeon: Xochitl Reyes MD;  Location: [facility];  Service: Gastroenterology;  Laterality: N/A;  pre: hx polyps  post: polyps  Lucy Art DO   • ENDOSCOPY N/A 5/12/2017    Non-obstructing Schatzki ring-dilated; Normal stomach; Normal examined duodenum; No specimens collected   • ENDOSCOPY  03/10/2016    Small HH; Low grade of narrowing Schatzki ring-dilated; Normal stomach; Normal examined duodenum; No specimens collected   • ENDOSCOPY  03/15/2012    HH; Schatzki ring-dilated to 19mm   • ENDOSCOPY  08/05/2005    Normal endoscopy; GERD by history    • ENDOSCOPY N/A 4/29/2021    Procedure: ESOPHAGOGASTRODUODENOSCOPY WITH ANESTHESIA;  Surgeon: Xochitl Reyes MD;  Location: [facility];  Service: Gastroenterology;  Laterality: N/A;  pre: dysphagia  post:hiatal hernia. schatzki's ring. dilated with balloon.  Lucy Art DO   • ENDOSCOPY N/A 7/14/2022    Procedure: ESOPHAGOGASTRODUODENOSCOPY WITH ANESTHESIA;  Surgeon: Xochitl Reyes MD;  Location: [facility];  Service: Gastroenterology;  Laterality: N/A;  pre: dysphagia  post: schatzki's ring. balloon dilation.   Lucy Art DO       • HAMMER TOE REPAIR Right 11/6/2018    Procedure: HAMMERTOE REPAIR WITH PINNING 2nd DIGIT, CARTIVA IMPLANT;  Surgeon: Pk East DPM;  Location: [facility];  Service: Podiatry   • HYSTERECTOMY     • KNEE ARTHROSCOPY W/ MENISCAL REPAIR Left    • PATENT FORAMEN OVALE CLOSURE  2014   • TOE NAIL AVULSION Right 11/6/2018    Procedure: TOE NAIL AVULSION WITH NAIL BIOPSY- RIGHT FOOT;  Surgeon: Pk East DPM;  Location: [facility];  Service: Podiatry   • TONSILLECTOMY         Outpatient Medications Marked as Taking  for the 2/21/23 encounter (Office Visit) with Enoch Naik MD   Medication Sig Dispense Refill   • Apoaequorin (Prevagen) 10 MG capsule Take  by mouth.     • Azelastine HCl 137 MCG/SPRAY solution 2 sprays by Each Nare route Daily. 30 mL 11   • buPROPion XL (WELLBUTRIN XL) 300 MG 24 hr tablet Take 300 mg by mouth Every Morning.     • Calcium Citrate-Vitamin D3 (CITRACAL) 315-6.25 MG-MCG tablet tablet Take  by mouth 2 (Two) Times a Day.     • cyclobenzaprine (FLEXERIL) 10 MG tablet Take 1 tablet by mouth 3 (Three) Times a Day As Needed for Muscle Spasms for up to 12 doses. 12 tablet 0   • fluticasone (FLONASE) 50 MCG/ACT nasal spray 2 sprays into the nostril(s) as directed by provider Daily. 1 bottle 6   • HYDROcodone-acetaminophen (NORCO) 7.5-325 MG per tablet      • hydroxychloroquine (PLAQUENIL) 200 MG tablet Take 1 tablet by mouth Daily.     • montelukast (SINGULAIR) 10 MG tablet Take 10 mg by mouth Every Night.     • Multiple Vitamins-Minerals (CENTRUM SILVER 50+WOMEN PO) Take 1 tablet by mouth Daily.     • pantoprazole (PROTONIX) 40 MG EC tablet Take 1 tablet by mouth Daily.     • pramipexole (MIRAPEX) 1 MG tablet Take 1 mg by mouth 2 (two) times a day. Takes 1/2 tablet at a.m dose     • Specialty Vitamins Products (VITAMINS FOR HAIR PO) Take 1 capsule by mouth Daily. Hair growth support     • traZODone (DESYREL) 50 MG tablet Take 50 mg by mouth As Needed.     • triamcinolone (KENALOG) 0.025 % cream Apply  topically to the appropriate area as directed 2 (Two) Times a Day. 15 g 0   • vitamin D (ERGOCALCIFEROL) 1.25 MG (19619 UT) capsule capsule Take 50,000 Units by mouth 1 (One) Time Per Week.         Patient has no known allergies.    Family History   Problem Relation Age of Onset   • Colon polyps Mother         Unknown age    • Heart disease Mother    • Cancer Mother         Breast Cancer   • Diabetes Mother    • Heart failure Mother         open heart surgery   • Hypertension Mother         Pulmonary  Hypertension   • Stroke Mother         while having heart cath   • Heart disease Father    • Cancer Father         Skin Cancer   • Heart failure Father         open heart surgery   • Stroke Father         Stroke   • Rashes / Skin problems Father         skin cancer   • Thyroid cancer Sister    • Leukemia Sister    • Osteoarthritis Maternal Grandmother         grandmother   • Rectal cancer Maternal Grandfather         In late 70's/early 80's    • Cancer Maternal Grandfather         Rectal Cancer   • Cancer Paternal Grandmother         Skin Cancer   • Osteoarthritis Maternal Aunt         Aunt   • Cancer Sister         thyroid cancer   • Thyroid disease Sister         thyroid cancer   • Colon cancer Neg Hx    • Esophageal cancer Neg Hx    • Liver cancer Neg Hx    • Liver disease Neg Hx    • Stomach cancer Neg Hx        Social History     Socioeconomic History   • Marital status:    Tobacco Use   • Smoking status: Former     Packs/day: 1.50     Years: 38.00     Pack years: 57.00     Types: Cigarettes     Start date: 3/1/1984     Quit date: 2006     Years since quittin.6   • Smokeless tobacco: Never   • Tobacco comments:     not sure when I started or quite but dates are close   Vaping Use   • Vaping Use: Some days   • Substances: THC   Substance and Sexual Activity   • Alcohol use: Yes     Alcohol/week: 2.0 - 4.0 standard drinks     Types: 2 - 4 Glasses of wine per week     Comment: Occasionally    • Drug use: Never   • Sexual activity: Defer       Review of Systems   Constitutional: Positive for fatigue.   HENT:        Admits snoring      Psychiatric/Behavioral: Positive for sleep disturbance.       Vitals:    23 1053   BP: 133/68   Pulse: 73   Resp: 16   Temp: 97.7 °F (36.5 °C)       Body mass index is 24.37 kg/m².    Objective     Physical Exam  Vitals reviewed.   Constitutional:       Appearance: Normal appearance. She is normal weight.   HENT:      Head: Normocephalic.      Right Ear: Tympanic  membrane, ear canal and external ear normal.      Left Ear: Tympanic membrane, ear canal and external ear normal.      Nose: Nose normal.      Mouth/Throat:      Lips: Pink.      Mouth: Mucous membranes are moist.      Pharynx: Uvula midline.      Comments: Sherwood II   Neurological:      Mental Status: She is alert.       Dr. Naik has examined and assessed the patient and agrees with current treatment plan      Assessment & Plan   Diagnoses and all orders for this visit:    1. Obstructive sleep apnea syndrome (Primary)  -     Case Request; Standing  -     Comprehensive Metabolic Panel; Future  -     CBC and Differential; Future  -     ECG 12 Lead; Future  -     XR Chest 2 View; Future  -     Case Request    2. Sleep disturbance  -     Case Request; Standing  -     Comprehensive Metabolic Panel; Future  -     CBC and Differential; Future  -     ECG 12 Lead; Future  -     XR Chest 2 View; Future  -     Case Request    3. Daytime somnolence  -     Case Request; Standing  -     Comprehensive Metabolic Panel; Future  -     CBC and Differential; Future  -     ECG 12 Lead; Future  -     XR Chest 2 View; Future  -     Case Request    4. Snoring  -     Case Request; Standing  -     Comprehensive Metabolic Panel; Future  -     CBC and Differential; Future  -     ECG 12 Lead; Future  -     XR Chest 2 View; Future  -     Case Request    Other orders  -     Follow Anesthesia Guidelines / Protocol; Future  -     Provide Patient With Instructions on NPO Status  -     Follow Anesthesia Guidelines / Protocol; Standing  -     Verify NPO Status; Standing  -     Obtain Informed Consent; Standing  -     SCD (Sequential Compression Device) - To Be Placed on Patient in Pre-Op; Standing  -     Patient to Void Prior to Transfer to OR; Standing  -     Instructions for Nursing; Standing      HYPOGLOSSAL NERVE STIMULATION DEVICE IMPLANT (N/A)  Orders Placed This Encounter   Procedures   • XR Chest 2 View     Standing Status:   Future      Standing Expiration Date:   2/21/2024     Order Specific Question:   Reason for Exam:     Answer:   preop testing   • Comprehensive Metabolic Panel     Standing Status:   Future     Standing Expiration Date:   2/21/2024     Order Specific Question:   Release to patient     Answer:   Immediate   • Provide Patient With Instructions on NPO Status   • ECG 12 Lead     Standing Status:   Future     Standing Expiration Date:   2/21/2024     Order Specific Question:   Reason for Exam:     Answer:   preop testing   • CBC and Differential     Standing Status:   Future     Standing Expiration Date:   2/21/2024     Order Specific Question:   Manual Differential     Answer:   No     No follow-ups on file.     Risks vs benefits of hypoglossal nerve stimulation divide implant discussed  Patient wishes to proceed     Patient Instructions   The risk benefits and options of HYPOGLOSSAL NERVE STIMULATION DEVICE IMPLANT (Inspire) were discussed with the patient including the risks of bleeding, infection, the need for implant removal, malfunctioning, battery replacement and other issues were discussed at length.  Was also discussed that the patient needed to limit movement of the chest and arm for 10 to 12 days postoperatively in order to limit development of seroma or hematoma and therefore subsequent infection.  The patient understands risk benefits and options and wishes to proceed.    Patient and family were instructed on the proper use of scheduled prescription drugs including their impact on driving and the potential effects during pregnancy.  The potential for overdose was discussed and their safe storage and proper disposal.  The website www.[employer] [place] [employer].[place].gov which contains other materials in this regard.

## 2023-02-21 ENCOUNTER — OFFICE VISIT (OUTPATIENT)
Dept: OTOLARYNGOLOGY | Facility: CLINIC | Age: 73
End: 2023-02-21
Payer: MEDICARE

## 2023-02-21 VITALS
HEIGHT: 69 IN | WEIGHT: 165 LBS | HEART RATE: 73 BPM | BODY MASS INDEX: 24.44 KG/M2 | SYSTOLIC BLOOD PRESSURE: 133 MMHG | TEMPERATURE: 97.7 F | DIASTOLIC BLOOD PRESSURE: 68 MMHG | RESPIRATION RATE: 16 BRPM

## 2023-02-21 DIAGNOSIS — G47.33 OBSTRUCTIVE SLEEP APNEA SYNDROME: Primary | ICD-10-CM

## 2023-02-21 DIAGNOSIS — G47.9 SLEEP DISTURBANCE: ICD-10-CM

## 2023-02-21 DIAGNOSIS — R40.0 DAYTIME SOMNOLENCE: ICD-10-CM

## 2023-02-21 DIAGNOSIS — R06.83 SNORING: ICD-10-CM

## 2023-02-21 PROCEDURE — 99214 OFFICE O/P EST MOD 30 MIN: CPT | Performed by: NURSE PRACTITIONER

## 2023-02-21 RX ORDER — HYDROCODONE BITARTRATE AND ACETAMINOPHEN 7.5; 325 MG/1; MG/1
TABLET ORAL
COMMUNITY
Start: 2023-02-17 | End: 2023-03-08

## 2023-02-21 NOTE — PATIENT INSTRUCTIONS
"SUBJECTIVE:  Zhang Stern is a 11 y.o. male here accompanied by mother for ADHD    HPI     Current medication(s): Focalin XR 15 mg-pt reports that he is not able to focus as well anymore  Takes Medication: school days only  Currently in: 5 th grade   Attends: in person classes  School performance/Behavior: no concerns; age appropriate  Appetite: somewhat decreased while on medications but overall ok  Sleep:no problems  Side effects: none    Review of Systems   Constitutional:  Negative for activity change, appetite change and fever.   HENT:  Negative for congestion, ear pain, rhinorrhea and sore throat.    Respiratory:  Negative for cough.    Gastrointestinal:  Negative for diarrhea and vomiting.   Genitourinary:  Negative for decreased urine volume.   Skin: Negative.  Negative for rash.   Neurological:  Negative for headaches.    A comprehensive review of symptoms was completed and negative except as noted above.    OBJECTIVE:  Vital signs  Vitals:    11/17/22 1452   BP: 115/75   Pulse: 81   Weight: 53.5 kg (117 lb 15.1 oz)   Height: 4' 9.68" (1.465 m)        Physical Exam  Vitals reviewed.   Constitutional:       General: He is active.      Appearance: Normal appearance. He is well-developed.   HENT:      Head: Normocephalic and atraumatic.      Right Ear: External ear normal.      Left Ear: External ear normal.      Nose: Nose normal.      Mouth/Throat:      Mouth: Mucous membranes are moist.   Eyes:      Conjunctiva/sclera: Conjunctivae normal.   Cardiovascular:      Rate and Rhythm: Normal rate and regular rhythm.   Pulmonary:      Effort: Pulmonary effort is normal. No respiratory distress.   Musculoskeletal:      Cervical back: Normal range of motion.   Neurological:      General: No focal deficit present.      Mental Status: He is alert and oriented for age.   Psychiatric:         Mood and Affect: Mood normal.         Behavior: Behavior normal.        ASSESSMENT/PLAN:  Zhang was seen today for " The risk benefits and options of HYPOGLOSSAL NERVE STIMULATION DEVICE IMPLANT (Inspire) were discussed with the patient including the risks of bleeding, infection, the need for implant removal, malfunctioning, battery replacement and other issues were discussed at length.  Was also discussed that the patient needed to limit movement of the chest and arm for 10 to 12 days postoperatively in order to limit development of seroma or hematoma and therefore subsequent infection.  The patient understands risk benefits and options and wishes to proceed.    Patient and family were instructed on the proper use of scheduled prescription drugs including their impact on driving and the potential effects during pregnancy.  The potential for overdose was discussed and their safe storage and proper disposal.  The website www.[employer].[place].gov which contains other materials in this regard.               adhd.    Diagnoses and all orders for this visit:    Viral URI  -     fluticasone propionate (FLONASE) 50 mcg/actuation nasal spray; 2 sprays (100 mcg total) by Each Nostril route once daily.    Attention deficit hyperactivity disorder (ADHD), combined type    Other orders  -     dexmethylphenidate (FOCALIN XR) 20 MG 24 hr capsule; Take 1 capsule (20 mg total) by mouth once daily.       Growth and development were reviewed/discussed and are within acceptable ranges for age.    Follow Up:  No follow-ups on file.

## 2023-02-21 NOTE — PROGRESS NOTES
[place] [address]  [place], [address]  Phone [phone] Fax [phone]     Sleep Study Technician Review    Patient Name:  Valentino Pali  :   1950  Referring Provider: Sherley Oquendo *    Brief History:  Valentino Pali is a 71 y.o. Female with a history of depression, GERDS, and RLS. The patient has had problems with sleep apnea, daytime somnolence, snoring, and disrupted sleep. Patient has tried CPAP in the past. She states she has tried for the past 2 years unsuccessfully. She has tried multiple different mask unsuccessfully. At this time she is not wearing CPAP as she was only getting 1-2 hours with the machine on. Height:  70 inches  Weight: 166 lbs  BMI: 23.8  Neck Circ: 13.5\"  Mallampati 4  ESS: 5    Type of Study: PSG with C PAP titration  Time Stage Position Snore Hypopnea Obs Apnea Jarred Apnea PAP O2   2200 Awake Supine No No No No CPAP 4 RA   2300 2 Supine No Yes No No CPAP 6 RA   2400 Awake Supine No No Yes No CPAP 8 RA   0100 2 Supine No Yes No No CPAP 9 RA   0200 REM Supine No Yes Yes No CPAP 14 RA   0300 2 Supine No Yes Yes No BIPAP 17/13 RA   0400 2 Supine No No No No BIPAP 17/13 RA     Summary: Patient states she is interested in the inspire device and she has a doctors appointment tomorrow with Dr. Ajay Moore. She states she has a CPAP machine but is not compliant with it. Patient could not tolerate CPAP and was placed on BIPAP. DME: [facility]   Final PAP settings: BIPAP 17/13   Mask Type: Full Face   Mask: Vitera   Mask Size: medium    The study was reviewed briefly with Valentino Pali. She will be notified of the formal results and recommendations after the study is scored and interpreted. The report will be sent to her referring provider.     Technician: James WRIGHT [facility] [place] RRT, RPSGT

## 2023-02-23 NOTE — PROGRESS NOTES
[place] [address]  [address] [address]  Phone [phone] Fax [phone]        Polysomnography Report  Patient Name Basilio Quezada Account Number [de-identified]    1950 Referring Provider Abril Carvajal M.D., [facility]   Age/ Gender 71 years/F Interpreting physician Abril Carvajal M.D., [facility]   Neck circumference/  Mallampati classification 13.5 in/class 4 Night Technician Santos Santos RRT, RPSGT  Oc Juares RRT   Lead score  Scoring Technician Jaylon Estrada, CRT, RPSGT   Height 70.0 in Indications for the test Obstructive Sleep Apnea   Weight 166.0 lbs Test Titration Polysomnogram   BMI 23.8 Date of test 2023     Procedure  A Titration Polysomnogram was conducted on the night of 2023. The study was performed and scored per AASM guidelines. The following were monitored: frontal, central, and occipital EEG, electrooculogram (EOG), submentalis EMG, nasal and oral airflow, intranasal pressure, thoracic plethysmography, abdominal plethysmography, anterior tibialis EMG, electrocardiogram, body position, and positive airway pressure (PAP). Arterial oxygen saturation was monitored with a pulse oximeter. The study was scored utilizing 30 second epochs. Hypopneas were scored using per AASM definition VIII, D, 1B.     Sleep Scoring Data  Lights out 9:52:53 PM Sleep latency 2.3 min Time in N1 61.5 min N1% 17.3%   Lights on 4:26:41 AM WASO 37.0 min Time in N2 243.5 min N2% 68.7%   TIB/.8 min Sleep efficiency 90.5% Time in N3 0.0 min N3% 0.0%   .5 min REM latency 246.5 min Time in R 49.5 min R% 14.0%     Respiratory Events Summary   NREM REM Total   Hypopnea index 3.5 4.8 3.7   Apnea index 1.8 7.3 2.5   RERA index 0.0 0.0 0.0   AHI 5.3 12.1 6.3   RDI (AHI + RERA index) 5.3 12.1 6.3     Respiratory Events by Sleep Stage   Obstructive Apneas OA Index Central Apneas CA Index Mixed Apneas MA Index   Hypopneas H Index   RERAs RERA Index   NREM 9 1.8 0 0.0 0 0.0 18 3.5 0 0.0   REM 6 7.3 0 0.0 0 0.0 4 4.8 0 0.0   Total 15 2.5 0 0.0 0 0.0 22 3.7 0 0.0     Respiratory Events by Body Position   Time (min) Obstructive Apneas OA Index Central Apneas CA Index Mixed Apneas MA Index   Hypopneas H Index   RERAs RERA  Index Total  AHI Total RDI   Supine 384.0  15 2.6 0 0.0 0 0.0 22 3.8 0 0.0 6.3 6.3   R/Supine                                           L/Supine                                           Right 0.1  0 0.0 0 0.0 0 0.0 0 0.0 0 0.0 0.0 0.0   Left 0.2  0 0.0 0 0.0 0 0.0 0 0.0 0 0.0 0.0 0.0   Prone 0.4  0       0.0 0 0.0 0 0.0 0 0.0 0 0.0 0.0 0.0     R/Prone                                               L/Prone                                             Up 6.8  0 0.0 0 0.0 0 0.0 0 0.0 0 0.0 0.0 0.0     Snoring  Snoring Rating (loudness 0-4) 1   Snoring Amount (% of total sleep time with snoring) 0%     Oximetry Data              Wake NREM REM TST   Average Saturation  97% 96% 97% 96%   Desaturation Index (#/hour)  2.2 7.3 2.9   Desaturation Max Duration (sec)  90.0 69.5 90.0   Minimum O2 Saturation    87%     Oximetry Distribution              WaKe REM NREM TOTAL %TIB   <90% (min) 0.2 0.4 0.0 0.6 0.2%   <85% (min) 0.0 0.0 0.0 0.0 0.0%   <80% (min) 0.0 0.0 0.0 0.0 0.0%   <75% (min) 0.0 0.0 0.0 0.0 0.0%   <70% (min) 0.0 0.0 0.0 0.0 0.0%   <60% (min) 0.0 0.0 0.0 0.0 0.0%   <50% (min) 0.0 0.0 0.0 0.0 0.0%   Fail    (min) 0.7 0.0 0.0 0.7      Respiratory Pattern   Present Absent Duration   Hypoventilation  ü N/A   Cheyne Rosado Respirations  ü N/A   Periodic Breathing  ü N/A     Heart Rate   Mean heart rate (bmp) Maximum heart rate (bmp)   During recording       95   During sleep 73.8 93     Heart Rhythm Summary  Normal sinus rhythm     Heart Rhythm Events   Type of events Present Absent Rate-Duration/Total Duration   Bradycardia  P N/A   Asystole  ü N/A     Sinus Tachycardia During Sleep  ü N/A   Narrow Complex Tachycardia  ü N/A Wide Complex Tachycardia  ü N/A   Atrial Fibrillation  ü N/A   Other Arrhythmias  P N/A     Arousals   NREM REM Total Arousal Index   Spontaneous 17 1 26 4.4   Respiratory 25 7 32 5.4   Snore 0 0 0 0.0   Leg movement 4 0 4 0.7   Total 46 8 66 11.2     Periodic Limb Movement Data (legs unless otherwise noted)   Leg Movements PLMS PLMS with arousal   # of events 8 8 4   Index (#/hr TST) 1.4 1.4 0.7     Therapy Titration  IPAP EPAP TIB Sleep REM Apneas Hypopneas RERAs   Min     (min) (min) (min) CA# OA# MA# Index # Index # Index AHI RDI SpO2    4 4 23.4 22.9 0.0 0 0 0 0.0 1 2.6 0 0.0 2.6 2.6 90    6 6 45.5 41.0 0.0 0 0 0 0.0 5 7.3 0 0.0 7.3 7.3 91    8 8 82.0 71.0 0.0 0 6 0 5.1 6 5.1 0 0.0 10.1 10.1 92    9 9 97.7 84.7 2.5 0 3 0 2.1 5 3.5 0 0.0 5.7 5.7 88    10 10 8.9 7.2 0.0 0 1 0 8.3 0 0.0 0 0.0 8.3 8.3 93    11 11 34.4 32.9 1.9 0 1 0 1.8 1 1.8 0 0.0 3.6 3.6 93    12 12 4.1 4.1 4.1 0 0 0 0.0 0 0.0 0 0.0 0.0 0.0 87    13 13 3.0 3.0 3.0 0 1 0 20.0 0 0.0 0 0.0 20.0 20.0 94    14 10 6.2 6.2 6.2 0 0 0 0.0 1 9.7 0 0.0 9.7 9.7 95    14 14 15.6 15.6 15.6 0 1 0 3.8 1 3.8 0 0.0 7.7 7.7 91    15 11 5.1 5.1 5.1 0 2 0 23.5 0 0.0 0 0.0 23.5 23.5 93    16 12 9.2 9.2 9.2 0 0 0 0.0 2 13.0 0 0.0 13.0 13.0 95    17 13 53.1 48.6 0.1 0 0 0 0.0 0 0.0 0 0.0 0.0 0.0 94      Note:   The interpreting physician named above, reviewed this record in its entirety, including sleep staging, EMG activity, EEG, EKG, breathing parameters, oxygen saturation, body position, and behavior unless otherwise noted. The interpretation is based on this information in addition to available clinical history and physical examination data. Interpretation:     Obstructive sleep apnea. Adequate PAP titration. Recommendations:    CPAP at 14 cm water pressure. Weight loss and exercise. Avoid risky activity such as driving if sleepy. Discuss sleep hygiene with the patient. Monitor PAP use and effectiveness.        Ariel Pressley MD, FCCP, JEIMY

## 2023-02-25 DIAGNOSIS — G47.33 OSA (OBSTRUCTIVE SLEEP APNEA): Primary | ICD-10-CM

## 2023-03-06 ENCOUNTER — TELEPHONE (OUTPATIENT)
Dept: OTOLARYNGOLOGY | Facility: CLINIC | Age: 73
End: 2023-03-06

## 2023-03-08 ENCOUNTER — HOSPITAL ENCOUNTER (OUTPATIENT)
Dept: GENERAL RADIOLOGY | Facility: HOSPITAL | Age: 73
Discharge: HOME | End: 2023-03-08
Payer: MEDICARE

## 2023-03-08 ENCOUNTER — PRE-ADMISSION TESTING (OUTPATIENT)
Dept: PREADMISSION TESTING | Facility: HOSPITAL | Age: 73
End: 2023-03-08
Payer: MEDICARE

## 2023-03-08 VITALS
HEIGHT: 68 IN | BODY MASS INDEX: 25.16 KG/M2 | SYSTOLIC BLOOD PRESSURE: 116 MMHG | HEART RATE: 74 BPM | OXYGEN SATURATION: 97 % | WEIGHT: 166.01 LBS | DIASTOLIC BLOOD PRESSURE: 46 MMHG | RESPIRATION RATE: 16 BRPM

## 2023-03-08 DIAGNOSIS — G47.33 OBSTRUCTIVE SLEEP APNEA SYNDROME: ICD-10-CM

## 2023-03-08 DIAGNOSIS — R40.0 DAYTIME SOMNOLENCE: ICD-10-CM

## 2023-03-08 DIAGNOSIS — G47.9 SLEEP DISTURBANCE: ICD-10-CM

## 2023-03-08 DIAGNOSIS — R06.83 SNORING: ICD-10-CM

## 2023-03-08 LAB
ALBUMIN SERPL-MCNC: 4.4 G/DL (ref 3.5–5.2)
ALBUMIN/GLOB SERPL: 2.1 G/DL
ALP SERPL-CCNC: 78 U/L (ref 39–117)
ALT SERPL W P-5'-P-CCNC: 20 U/L (ref 1–33)
ANION GAP SERPL CALCULATED.3IONS-SCNC: 10 MMOL/L (ref 5–15)
AST SERPL-CCNC: 21 U/L (ref 1–32)
BASOPHILS # BLD AUTO: 0.05 10*3/MM3 (ref 0–0.2)
BASOPHILS NFR BLD AUTO: 1 % (ref 0–1.5)
BILIRUB SERPL-MCNC: 0.6 MG/DL (ref 0–1.2)
BUN SERPL-MCNC: 13 MG/DL (ref 8–23)
BUN/CREAT SERPL: 15.5 (ref 7–25)
CALCIUM SPEC-SCNC: 9.2 MG/DL (ref 8.6–10.5)
CHLORIDE SERPL-SCNC: 104 MMOL/L (ref 98–107)
CO2 SERPL-SCNC: 26 MMOL/L (ref 22–29)
CREAT SERPL-MCNC: 0.84 MG/DL (ref 0.57–1)
DEPRECATED RDW RBC AUTO: 41 FL (ref 37–54)
EGFRCR SERPLBLD CKD-EPI 2021: 74.4 ML/MIN/1.73
EOSINOPHIL # BLD AUTO: 0.34 10*3/MM3 (ref 0–0.4)
EOSINOPHIL NFR BLD AUTO: 6.8 % (ref 0.3–6.2)
ERYTHROCYTE [DISTWIDTH] IN BLOOD BY AUTOMATED COUNT: 12.2 % (ref 12.3–15.4)
GLOBULIN UR ELPH-MCNC: 2.1 GM/DL
GLUCOSE SERPL-MCNC: 79 MG/DL (ref 65–99)
HCT VFR BLD AUTO: 38.8 % (ref 34–46.6)
HGB BLD-MCNC: 13.2 G/DL (ref 12–15.9)
IMM GRANULOCYTES # BLD AUTO: 0.01 10*3/MM3 (ref 0–0.05)
IMM GRANULOCYTES NFR BLD AUTO: 0.2 % (ref 0–0.5)
LYMPHOCYTES # BLD AUTO: 0.78 10*3/MM3 (ref 0.7–3.1)
LYMPHOCYTES NFR BLD AUTO: 15.6 % (ref 19.6–45.3)
MCH RBC QN AUTO: 31.1 PG (ref 26.6–33)
MCHC RBC AUTO-ENTMCNC: 34 G/DL (ref 31.5–35.7)
MCV RBC AUTO: 91.5 FL (ref 79–97)
MONOCYTES # BLD AUTO: 0.31 10*3/MM3 (ref 0.1–0.9)
MONOCYTES NFR BLD AUTO: 6.2 % (ref 5–12)
NEUTROPHILS NFR BLD AUTO: 3.5 10*3/MM3 (ref 1.7–7)
NEUTROPHILS NFR BLD AUTO: 70.2 % (ref 42.7–76)
NRBC BLD AUTO-RTO: 0 /100 WBC (ref 0–0.2)
PLATELET # BLD AUTO: 240 10*3/MM3 (ref 140–450)
PMV BLD AUTO: 9.2 FL (ref 6–12)
POTASSIUM SERPL-SCNC: 3.8 MMOL/L (ref 3.5–5.2)
PROT SERPL-MCNC: 6.5 G/DL (ref 6–8.5)
RBC # BLD AUTO: 4.24 10*6/MM3 (ref 3.77–5.28)
SODIUM SERPL-SCNC: 140 MMOL/L (ref 136–145)
WBC NRBC COR # BLD: 4.99 10*3/MM3 (ref 3.4–10.8)

## 2023-03-08 PROCEDURE — 85025 COMPLETE CBC W/AUTO DIFF WBC: CPT

## 2023-03-08 PROCEDURE — 36415 COLL VENOUS BLD VENIPUNCTURE: CPT

## 2023-03-08 PROCEDURE — 80053 COMPREHEN METABOLIC PANEL: CPT

## 2023-03-08 PROCEDURE — 93010 ELECTROCARDIOGRAM REPORT: CPT | Performed by: STUDENT IN AN ORGANIZED HEALTH CARE EDUCATION/TRAINING PROGRAM

## 2023-03-08 PROCEDURE — 71046 X-RAY EXAM CHEST 2 VIEWS: CPT

## 2023-03-08 PROCEDURE — 93005 ELECTROCARDIOGRAM TRACING: CPT

## 2023-03-08 NOTE — DISCHARGE INSTRUCTIONS
Before you come to the hospital        Arrival time: AS DIRECTED BY OFFICE     YOU MAY TAKE THE FOLLOWING MEDICATION(S) THE MORNING OF SURGERY WITH A SIP OF WATER: NONE morning of surgery            ALL OTHER HOME MEDICATION CHECK WITH YOUR PHYSICIAN (especially if   you are taking diabetes medicines or blood thinners)    Do not take any Erectile Dysfunction medications (EX: CIALIS, VIAGRA) 24 hours prior to surgery.      If you were given and instructed to use a germ- killing soap, use as directed the night before surgery and again the morning of surgery or as directed by your surgeon. (Use one-half of the bottle with each shower.)   See attached information for How to Use Chlorhexidine for Bathing if applicable.            Eating and drinking restrictions prior to scheduled arrival time    2 Hours before arrival time STOP   Drinking Clear liquids (water, apple juice-no pulp)     6 Hours before arrival time STOP   Milk or drinks that contain milk, full liquids    6 Hours before arrival time STOP   Light meals or foods, such as toast or cereal    8 Hours before arrival time STOP   Heavy foods, such as meat, fried foods, or fatty foods    (It is extremely important that you follow these guidelines to prevent delay or cancelation of your procedure)     Clear Liquids  Water and flavored water                                                                      Clear Fruit juices, such as cranberry juice and apple juice.  Black coffee (NO cream of any kind, including powdered).  Plain tea  Clear bouillon or broth.  Flavored gelatin.  Soda.  Gatorade or Powerade.  Full liquid examples  Juices that have pulp.  Frozen ice pops that contain fruit pieces.  Coffee with creamer  Milk.  Yogurt.                MANAGING PAIN AFTER SURGERY    We know you are probably wondering what your pain will be like after surgery.  Following surgery it is unrealistic to expect you will not have pain.   Pain is how our bodies let us know that  something is wrong or cautions us to be careful.  That said, our goal is to make your pain tolerable.    Methods we may use to treat your pain include (oral or IV medications, PCAs, epidurals, nerve blocks, etc.)   While some procedures require IV pain medications for a short time after surgery, transitioning to pain medications by mouth allows for better management of pain.   Your nurse will encourage you to take oral pain medications whenever possible.  IV medications work almost immediately, but only last a short while.  Taking medications by mouth allows for a more constant level of medication in your blood stream for a longer period of time.      Once your pain is out of control it is harder to get back under control.  It is important you are aware when your next dose of pain medication is due.  If you are admitted, your nurse may write the time of your next dose on the white board in your room to help you remember.      We are interested in your pain and encourage you to inform us about aggravating factors during your visit.   Many times a simple repositioning every few hours can make a big difference.    If your physician says it is okay, do not let your pain prevent you from getting out of bed. Be sure to call your nurse for assistance prior to getting up so you do not fall.      Before surgery, please decide your tolerable pain goal.  These faces help describe the pain ratings we use on a 0-10 scale.   Be prepared to tell us your goal and whether or not you take pain or anxiety medications at home.          Preparing for Surgery  Preparing for surgery is an important part of your care. It can make things go more smoothly and help you avoid complications. The steps leading up to surgery may vary among hospitals. Follow all instructions given to you by your health care providers. Ask questions if you do not understand something. Talk about any concerns that you have.  Here are some questions to consider  asking before your surgery:  If my surgery is not an emergency (is elective), when would be the best time to have the surgery?  What arrangements do I need to make for work, home, or school?  What will my recovery be like? How long will it be before I can return to normal activities?  Will I need to prepare my home? Will I need to arrange care for me or my children?  Should I expect to have pain after surgery? What are my pain management options? Are there nonmedical options that I can try for pain?  Tell a health care provider about:  Any allergies you have.  All medicines you are taking, including vitamins, herbs, eye drops, creams, and over-the-counter medicines.  Any problems you or family members have had with anesthetic medicines.  Any blood disorders you have.  Any surgeries you have had.  Any medical conditions you have.  Whether you are pregnant or may be pregnant.  What are the risks?  The risks and complications of surgery depend on the specific procedure that you have. Discuss all the risks with your health care providers before your surgery. Ask about common surgical complications, which may include:  Infection.  Bleeding or a need for blood replacement (transfusion).  Allergic reactions to medicines.  Damage to surrounding nerves, tissues, or structures.  A blood clot.  Scarring.  Failure of the surgery to correct the problem.  Follow these instructions before the procedure:  Several days or weeks before your procedure  You may have a physical exam by your primary health care provider to make sure it is safe for you to have surgery.  You may have testing. This may include a chest X-ray, blood and urine tests, electrocardiogram (ECG), or other testing.  Ask your health care provider about:  Changing or stopping your regular medicines. This is especially important if you are taking diabetes medicines or blood thinners.  Taking medicines such as aspirin and ibuprofen. These medicines can thin your blood.  Do not take these medicines unless your health care provider tells you to take them.  Taking over-the-counter medicines, vitamins, herbs, and supplements.  Do not use any products that contain nicotine or tobacco, such as cigarettes and e-cigarettes. If you need help quitting, ask your health care provider.  Avoid alcohol.  Ask your health care provider if there are exercises you can do to prepare for surgery.  Eat a healthy diet.   Plan to have someone take you home from the hospital or clinic.  Plan to have a responsible adult care for you for at least 24 hours after you leave the hospital or clinic. This is important.  The day before your procedure  You may be given antibiotic medicine to take by mouth to help prevent infection. Take it as told by your health care provider.  You may be asked to shower with a germ-killing soap.  Follow instructions from your health care provider about eating and drinking restrictions. This includes gum, mints and hard candy.  Pack comfortable clothes according to your procedure.   The day of your procedure  You may need to take another shower with a germ-killing soap before you leave home in the morning.  With a small sip of water, take only the medicines that you are told to take.  Remove all jewelry including rings.   Leave anything you consider valuable at home except hearing aids if needed.  You do not need to bring your home medications into the hospital.   Do not wear any makeup, nail polish, powder, deodorant, lotion, hair accessories, or anything on your skin or body except your clothes.  If you will be staying in the hospital, bring a case to hold your glasses, contacts, or dentures. You may also want to bring your robe and non-skid footwear.       (Do not use denture adhesives since you will be asked to remove them during  surgery).   If you wear oxygen at home, bring it with you the day of surgery.  If instructed by your health care provider, bring your sleep apnea  device with you on the day of your surgery (if this applies to you).  You may want to leave your suitcase and sleep apnea device in the car until after surgery.   Arrive at the hospital as scheduled.  Bring a friend or family member with you who can help to answer questions and be present while you meet with your health care provider.  At the hospital  When you arrive at the hospital:  Go to registration located at the main entrance of the hospital. You will be registered and given a beeper and a sticker sheet. Take the stickers to the Outpatient nurses desk and place in the black tray. This is to notify staff that you have arrived. Then return to the lobby to wait.   When your beeper lights up and vibrates proceed through the double doors, under the stairs, and a member of the Outpatient Surgery staff will escort you to your preoperative room.  You may have to wear compression sleeves. These help to prevent blood clots and reduce swelling in your legs.  An IV may be inserted into one of your veins.              In the operating room, you may be given one or more of the following:        A medicine to help you relax (sedative).        A medicine to numb the area (local anesthetic).        A medicine to make you fall asleep (general anesthetic).        A medicine that is injected into an area of your body to numb everything below the                      injection site (regional anesthetic).  You may be given an antibiotic through your IV to help prevent infection.  Your surgical site will be marked or identified.    Contact a health care provider if you:  Develop a fever of more than 100.4°F (38°C) or other feelings of illness during the 48 hours before your surgery.  Have symptoms that get worse.  Have questions or concerns about your surgery.  Summary  Preparing for surgery can make the procedure go more smoothly and lower your risk of complications.  Before surgery, make a list of questions and concerns to  discuss with your surgeon. Ask about the risks and possible complications.  In the days or weeks before your surgery, follow all instructions from your health care provider. You may need to stop smoking, avoid alcohol, follow eating restrictions, and change or stop your regular medicines.  Contact your surgeon if you develop a fever or other signs of illness during the few days before your surgery.  This information is not intended to replace advice given to you by your health care provider. Make sure you discuss any questions you have with your health care provider.  Document Revised: 12/21/2018 Document Reviewed: 10/23/2018  Elsevier Patient Education © 2021 [employer]

## 2023-03-14 ENCOUNTER — ANESTHESIA EVENT (OUTPATIENT)
Dept: PERIOP | Facility: HOSPITAL | Age: 73
End: 2023-03-14
Payer: MEDICARE

## 2023-03-15 ENCOUNTER — HOSPITAL ENCOUNTER (OUTPATIENT)
Facility: HOSPITAL | Age: 73
Setting detail: HOSPITAL OUTPATIENT SURGERY
Discharge: HOME | End: 2023-03-15
Attending: OTOLARYNGOLOGY | Admitting: OTOLARYNGOLOGY
Payer: MEDICARE

## 2023-03-15 ENCOUNTER — ANESTHESIA (OUTPATIENT)
Dept: PERIOP | Facility: HOSPITAL | Age: 73
End: 2023-03-15
Payer: MEDICARE

## 2023-03-15 VITALS
HEART RATE: 84 BPM | RESPIRATION RATE: 16 BRPM | DIASTOLIC BLOOD PRESSURE: 63 MMHG | TEMPERATURE: 98.3 F | OXYGEN SATURATION: 97 % | SYSTOLIC BLOOD PRESSURE: 129 MMHG

## 2023-03-15 DIAGNOSIS — G47.33 OBSTRUCTIVE SLEEP APNEA SYNDROME: Primary | ICD-10-CM

## 2023-03-15 PROCEDURE — C1778 LEAD, NEUROSTIMULATOR: HCPCS | Performed by: OTOLARYNGOLOGY

## 2023-03-15 PROCEDURE — 25010000002 ONDANSETRON PER 1 MG: Performed by: NURSE ANESTHETIST, CERTIFIED REGISTERED

## 2023-03-15 PROCEDURE — C1787 PATIENT PROGR, NEUROSTIM: HCPCS | Performed by: OTOLARYNGOLOGY

## 2023-03-15 PROCEDURE — 25010000002 FENTANYL CITRATE (PF) 100 MCG/2ML SOLUTION: Performed by: NURSE ANESTHETIST, CERTIFIED REGISTERED

## 2023-03-15 PROCEDURE — 64582 OPN MPLTJ HPGLSL NSTM ARY PG: CPT | Performed by: OTOLARYNGOLOGY

## 2023-03-15 PROCEDURE — 25010000002 DEXAMETHASONE PER 1 MG: Performed by: NURSE ANESTHETIST, CERTIFIED REGISTERED

## 2023-03-15 PROCEDURE — 25010000002 CEFAZOLIN PER 500 MG: Performed by: NURSE ANESTHETIST, CERTIFIED REGISTERED

## 2023-03-15 PROCEDURE — 25010000002 PROPOFOL 10 MG/ML EMULSION: Performed by: NURSE ANESTHETIST, CERTIFIED REGISTERED

## 2023-03-15 PROCEDURE — C1767 GENERATOR, NEURO NON-RECHARG: HCPCS | Performed by: OTOLARYNGOLOGY

## 2023-03-15 PROCEDURE — 25010000002 DROPERIDOL PER 5 MG: Performed by: ANESTHESIOLOGY

## 2023-03-15 DEVICE — LD SENSR IPG INSPIRE RESP 45CM 3.6MM: Type: IMPLANTABLE DEVICE | Site: NECK | Status: FUNCTIONAL

## 2023-03-15 DEVICE — LD STIM IPG INSPIRE 3/ELECTRD 45CM: Type: IMPLANTABLE DEVICE | Site: NECK | Status: FUNCTIONAL

## 2023-03-15 DEVICE — GEN IPG INSPIRE4 RESP/SENSR/LD NONRECHG: Type: IMPLANTABLE DEVICE | Site: NECK | Status: FUNCTIONAL

## 2023-03-15 RX ORDER — ACETAMINOPHEN 500 MG
1000 TABLET ORAL ONCE
Status: COMPLETED | OUTPATIENT
Start: 2023-03-15 | End: 2023-03-15

## 2023-03-15 RX ORDER — MUPIROCIN CALCIUM 20 MG/G
CREAM TOPICAL AS NEEDED
Status: DISCONTINUED | OUTPATIENT
Start: 2023-03-15 | End: 2023-03-15 | Stop reason: HOSPADM

## 2023-03-15 RX ORDER — DROPERIDOL 2.5 MG/ML
INJECTION, SOLUTION INTRAMUSCULAR; INTRAVENOUS
Status: DISCONTINUED
Start: 2023-03-15 | End: 2023-03-15 | Stop reason: HOSPADM

## 2023-03-15 RX ORDER — OXYCODONE AND ACETAMINOPHEN 7.5; 325 MG/1; MG/1
2 TABLET ORAL EVERY 4 HOURS PRN
Status: DISCONTINUED | OUTPATIENT
Start: 2023-03-15 | End: 2023-03-15 | Stop reason: HOSPADM

## 2023-03-15 RX ORDER — MIDAZOLAM HYDROCHLORIDE 1 MG/ML
0.5 INJECTION INTRAMUSCULAR; INTRAVENOUS
Status: DISCONTINUED | OUTPATIENT
Start: 2023-03-15 | End: 2023-03-15 | Stop reason: HOSPADM

## 2023-03-15 RX ORDER — FENTANYL CITRATE 50 UG/ML
25 INJECTION, SOLUTION INTRAMUSCULAR; INTRAVENOUS
Status: DISCONTINUED | OUTPATIENT
Start: 2023-03-15 | End: 2023-03-15 | Stop reason: HOSPADM

## 2023-03-15 RX ORDER — DROPERIDOL 2.5 MG/ML
0.62 INJECTION, SOLUTION INTRAMUSCULAR; INTRAVENOUS ONCE AS NEEDED
Status: COMPLETED | OUTPATIENT
Start: 2023-03-15 | End: 2023-03-15

## 2023-03-15 RX ORDER — FLUMAZENIL 0.1 MG/ML
0.2 INJECTION INTRAVENOUS AS NEEDED
Status: DISCONTINUED | OUTPATIENT
Start: 2023-03-15 | End: 2023-03-15 | Stop reason: HOSPADM

## 2023-03-15 RX ORDER — LIDOCAINE HYDROCHLORIDE AND EPINEPHRINE 10; 10 MG/ML; UG/ML
INJECTION, SOLUTION INFILTRATION; PERINEURAL AS NEEDED
Status: DISCONTINUED | OUTPATIENT
Start: 2023-03-15 | End: 2023-03-15 | Stop reason: HOSPADM

## 2023-03-15 RX ORDER — FENTANYL CITRATE 50 UG/ML
INJECTION, SOLUTION INTRAMUSCULAR; INTRAVENOUS AS NEEDED
Status: DISCONTINUED | OUTPATIENT
Start: 2023-03-15 | End: 2023-03-15 | Stop reason: SURG

## 2023-03-15 RX ORDER — SODIUM CHLORIDE, SODIUM LACTATE, POTASSIUM CHLORIDE, CALCIUM CHLORIDE 600; 310; 30; 20 MG/100ML; MG/100ML; MG/100ML; MG/100ML
100 INJECTION, SOLUTION INTRAVENOUS CONTINUOUS
Status: DISCONTINUED | OUTPATIENT
Start: 2023-03-15 | End: 2023-03-15 | Stop reason: HOSPADM

## 2023-03-15 RX ORDER — BUPIVACAINE HCL/0.9 % NACL/PF 0.125 %
PLASTIC BAG, INJECTION (ML) EPIDURAL AS NEEDED
Status: DISCONTINUED | OUTPATIENT
Start: 2023-03-15 | End: 2023-03-15 | Stop reason: SURG

## 2023-03-15 RX ORDER — ONDANSETRON 4 MG/1
4 TABLET, FILM COATED ORAL ONCE AS NEEDED
Status: DISCONTINUED | OUTPATIENT
Start: 2023-03-15 | End: 2023-03-15 | Stop reason: HOSPADM

## 2023-03-15 RX ORDER — SODIUM CHLORIDE 0.9 % (FLUSH) 0.9 %
3 SYRINGE (ML) INJECTION AS NEEDED
Status: DISCONTINUED | OUTPATIENT
Start: 2023-03-15 | End: 2023-03-15 | Stop reason: HOSPADM

## 2023-03-15 RX ORDER — DROPERIDOL 2.5 MG/ML
0.62 INJECTION, SOLUTION INTRAMUSCULAR; INTRAVENOUS ONCE AS NEEDED
Status: DISCONTINUED | OUTPATIENT
Start: 2023-03-15 | End: 2023-03-15 | Stop reason: HOSPADM

## 2023-03-15 RX ORDER — LIDOCAINE HYDROCHLORIDE 40 MG/ML
SOLUTION TOPICAL AS NEEDED
Status: DISCONTINUED | OUTPATIENT
Start: 2023-03-15 | End: 2023-03-15 | Stop reason: SURG

## 2023-03-15 RX ORDER — SODIUM CHLORIDE, SODIUM LACTATE, POTASSIUM CHLORIDE, CALCIUM CHLORIDE 600; 310; 30; 20 MG/100ML; MG/100ML; MG/100ML; MG/100ML
1000 INJECTION, SOLUTION INTRAVENOUS CONTINUOUS
Status: DISCONTINUED | OUTPATIENT
Start: 2023-03-15 | End: 2023-03-15 | Stop reason: HOSPADM

## 2023-03-15 RX ORDER — SODIUM CHLORIDE 9 MG/ML
40 INJECTION, SOLUTION INTRAVENOUS AS NEEDED
Status: DISCONTINUED | OUTPATIENT
Start: 2023-03-15 | End: 2023-03-15 | Stop reason: HOSPADM

## 2023-03-15 RX ORDER — ACETAMINOPHEN 500 MG
TABLET ORAL
Status: DISCONTINUED
Start: 2023-03-15 | End: 2023-03-15 | Stop reason: HOSPADM

## 2023-03-15 RX ORDER — SODIUM CHLORIDE 0.9 % (FLUSH) 0.9 %
3 SYRINGE (ML) INJECTION EVERY 12 HOURS SCHEDULED
Status: DISCONTINUED | OUTPATIENT
Start: 2023-03-15 | End: 2023-03-15 | Stop reason: HOSPADM

## 2023-03-15 RX ORDER — LIDOCAINE HYDROCHLORIDE 10 MG/ML
0.5 INJECTION, SOLUTION EPIDURAL; INFILTRATION; INTRACAUDAL; PERINEURAL ONCE AS NEEDED
Status: DISCONTINUED | OUTPATIENT
Start: 2023-03-15 | End: 2023-03-15 | Stop reason: HOSPADM

## 2023-03-15 RX ORDER — ONDANSETRON 2 MG/ML
4 INJECTION INTRAMUSCULAR; INTRAVENOUS ONCE AS NEEDED
Status: DISCONTINUED | OUTPATIENT
Start: 2023-03-15 | End: 2023-03-15 | Stop reason: HOSPADM

## 2023-03-15 RX ORDER — LABETALOL HYDROCHLORIDE 5 MG/ML
5 INJECTION, SOLUTION INTRAVENOUS
Status: DISCONTINUED | OUTPATIENT
Start: 2023-03-15 | End: 2023-03-15 | Stop reason: HOSPADM

## 2023-03-15 RX ORDER — NALOXONE HCL 0.4 MG/ML
0.4 VIAL (ML) INJECTION AS NEEDED
Status: DISCONTINUED | OUTPATIENT
Start: 2023-03-15 | End: 2023-03-15 | Stop reason: HOSPADM

## 2023-03-15 RX ORDER — HYDROCODONE BITARTRATE AND ACETAMINOPHEN 5; 325 MG/1; MG/1
1 TABLET ORAL EVERY 8 HOURS PRN
Qty: 8 TABLET | Refills: 0 | Status: SHIPPED | OUTPATIENT
Start: 2023-03-15

## 2023-03-15 RX ORDER — ONDANSETRON 2 MG/ML
INJECTION INTRAMUSCULAR; INTRAVENOUS AS NEEDED
Status: DISCONTINUED | OUTPATIENT
Start: 2023-03-15 | End: 2023-03-15 | Stop reason: SURG

## 2023-03-15 RX ORDER — HYDROCODONE BITARTRATE AND ACETAMINOPHEN 5; 325 MG/1; MG/1
1 TABLET ORAL ONCE AS NEEDED
Status: DISCONTINUED | OUTPATIENT
Start: 2023-03-15 | End: 2023-03-15 | Stop reason: HOSPADM

## 2023-03-15 RX ORDER — PROPOFOL 10 MG/ML
VIAL (ML) INTRAVENOUS AS NEEDED
Status: DISCONTINUED | OUTPATIENT
Start: 2023-03-15 | End: 2023-03-15 | Stop reason: SURG

## 2023-03-15 RX ORDER — MAGNESIUM HYDROXIDE 1200 MG/15ML
LIQUID ORAL AS NEEDED
Status: DISCONTINUED | OUTPATIENT
Start: 2023-03-15 | End: 2023-03-15 | Stop reason: HOSPADM

## 2023-03-15 RX ORDER — SUCCINYLCHOLINE/SOD CL,ISO/PF 200MG/10ML
SYRINGE (ML) INTRAVENOUS AS NEEDED
Status: DISCONTINUED | OUTPATIENT
Start: 2023-03-15 | End: 2023-03-15 | Stop reason: SURG

## 2023-03-15 RX ORDER — CEFAZOLIN SODIUM 1 G/3ML
INJECTION, POWDER, FOR SOLUTION INTRAMUSCULAR; INTRAVENOUS AS NEEDED
Status: DISCONTINUED | OUTPATIENT
Start: 2023-03-15 | End: 2023-03-15 | Stop reason: SURG

## 2023-03-15 RX ORDER — IBUPROFEN 600 MG/1
600 TABLET ORAL ONCE AS NEEDED
Status: DISCONTINUED | OUTPATIENT
Start: 2023-03-15 | End: 2023-03-15 | Stop reason: HOSPADM

## 2023-03-15 RX ORDER — ROCURONIUM BROMIDE 10 MG/ML
INJECTION, SOLUTION INTRAVENOUS AS NEEDED
Status: DISCONTINUED | OUTPATIENT
Start: 2023-03-15 | End: 2023-03-15 | Stop reason: SURG

## 2023-03-15 RX ORDER — SODIUM CHLORIDE 0.9 % (FLUSH) 0.9 %
3-10 SYRINGE (ML) INJECTION AS NEEDED
Status: DISCONTINUED | OUTPATIENT
Start: 2023-03-15 | End: 2023-03-15 | Stop reason: HOSPADM

## 2023-03-15 RX ORDER — EPHEDRINE SULFATE 50 MG/ML
INJECTION, SOLUTION INTRAVENOUS AS NEEDED
Status: DISCONTINUED | OUTPATIENT
Start: 2023-03-15 | End: 2023-03-15 | Stop reason: SURG

## 2023-03-15 RX ORDER — OXYCODONE AND ACETAMINOPHEN 10; 325 MG/1; MG/1
1 TABLET ORAL ONCE AS NEEDED
Status: DISCONTINUED | OUTPATIENT
Start: 2023-03-15 | End: 2023-03-15 | Stop reason: HOSPADM

## 2023-03-15 RX ORDER — DEXAMETHASONE SODIUM PHOSPHATE 4 MG/ML
INJECTION, SOLUTION INTRA-ARTICULAR; INTRALESIONAL; INTRAMUSCULAR; INTRAVENOUS; SOFT TISSUE AS NEEDED
Status: DISCONTINUED | OUTPATIENT
Start: 2023-03-15 | End: 2023-03-15 | Stop reason: SURG

## 2023-03-15 RX ADMIN — Medication 100 MG: at 07:57

## 2023-03-15 RX ADMIN — ACETAMINOPHEN 1000 MG: 500 TABLET, FILM COATED ORAL at 07:04

## 2023-03-15 RX ADMIN — CEFAZOLIN 1 G: 330 INJECTION, POWDER, FOR SOLUTION INTRAMUSCULAR; INTRAVENOUS at 08:11

## 2023-03-15 RX ADMIN — EPHEDRINE SULFATE 20 MG: 50 INJECTION INTRAVENOUS at 08:12

## 2023-03-15 RX ADMIN — ONDANSETRON 4 MG: 2 INJECTION INTRAMUSCULAR; INTRAVENOUS at 09:02

## 2023-03-15 RX ADMIN — PROPOFOL INJECTABLE EMULSION 150 MG: 10 INJECTION, EMULSION INTRAVENOUS at 07:57

## 2023-03-15 RX ADMIN — Medication 100 MCG: at 08:06

## 2023-03-15 RX ADMIN — SODIUM CHLORIDE, POTASSIUM CHLORIDE, SODIUM LACTATE AND CALCIUM CHLORIDE 1000 ML: 600; 310; 30; 20 INJECTION, SOLUTION INTRAVENOUS at 06:08

## 2023-03-15 RX ADMIN — ROCURONIUM BROMIDE 5 MG: 10 INJECTION, SOLUTION INTRAVENOUS at 07:57

## 2023-03-15 RX ADMIN — EPHEDRINE SULFATE 15 MG: 50 INJECTION INTRAVENOUS at 08:21

## 2023-03-15 RX ADMIN — FENTANYL CITRATE 100 MCG: 50 INJECTION, SOLUTION INTRAMUSCULAR; INTRAVENOUS at 08:16

## 2023-03-15 RX ADMIN — LIDOCAINE HYDROCHLORIDE 1 EACH: 40 SOLUTION TOPICAL at 07:59

## 2023-03-15 RX ADMIN — SODIUM CHLORIDE, POTASSIUM CHLORIDE, SODIUM LACTATE AND CALCIUM CHLORIDE: 600; 310; 30; 20 INJECTION, SOLUTION INTRAVENOUS at 08:21

## 2023-03-15 RX ADMIN — DROPERIDOL 0.62 MG: 2.5 INJECTION, SOLUTION INTRAMUSCULAR; INTRAVENOUS at 07:04

## 2023-03-15 RX ADMIN — FENTANYL CITRATE 100 MCG: 50 INJECTION, SOLUTION INTRAMUSCULAR; INTRAVENOUS at 08:31

## 2023-03-15 RX ADMIN — PROPOFOL INJECTABLE EMULSION 100 MG: 10 INJECTION, EMULSION INTRAVENOUS at 08:30

## 2023-03-15 RX ADMIN — EPHEDRINE SULFATE 15 MG: 50 INJECTION INTRAVENOUS at 08:30

## 2023-03-15 RX ADMIN — FENTANYL CITRATE 100 MCG: 50 INJECTION, SOLUTION INTRAMUSCULAR; INTRAVENOUS at 08:05

## 2023-03-15 RX ADMIN — DEXAMETHASONE SODIUM PHOSPHATE 8 MG: 4 INJECTION, SOLUTION INTRA-ARTICULAR; INTRALESIONAL; INTRAMUSCULAR; INTRAVENOUS; SOFT TISSUE at 08:07

## 2023-03-15 RX ADMIN — Medication 100 MCG: at 08:35

## 2023-03-15 RX ADMIN — FENTANYL CITRATE 100 MCG: 50 INJECTION, SOLUTION INTRAMUSCULAR; INTRAVENOUS at 07:53

## 2023-03-15 RX ADMIN — PROPOFOL INJECTABLE EMULSION 50 MG: 10 INJECTION, EMULSION INTRAVENOUS at 08:05

## 2023-03-15 NOTE — ANESTHESIA POSTPROCEDURE EVALUATION
Patient: Earline GOINS    Procedure Summary     Date: 03/15/23 Room / Location:  [facility] OR 02 /  [facility]    Anesthesia Start: 0753 Anesthesia Stop: 0956    Procedure: HYPOGLOSSAL NERVE STIMULATION DEVICE IMPLANT (Neck) Diagnosis:       Sleep disturbance      Obstructive sleep apnea syndrome      Daytime somnolence      Snoring      (Sleep disturbance [G47.9])      (Obstructive sleep apnea syndrome [G47.33])      (Daytime somnolence [R40.0])      (Snoring [R06.83])    Surgeons: Enoch Naik MD Provider: Pk Billings CRNA    Anesthesia Type: general ASA Status: 2          Anesthesia Type: general    Vitals  Vitals Value Taken Time   /57 03/15/23 1017   Temp 98.3 °F (36.8 °C) 03/15/23 0951   Pulse 87 03/15/23 1019   Resp 18 03/15/23 1015   SpO2 94 % 03/15/23 1019   Vitals shown include unvalidated device data.        Post Anesthesia Care and Evaluation    Patient location during evaluation: PACU  Patient participation: complete - patient participated  Level of consciousness: awake and alert  Pain management: adequate    Airway patency: patent  Anesthetic complications: No anesthetic complications  PONV Status: none  Cardiovascular status: acceptable and hemodynamically stable  Respiratory status: acceptable  Hydration status: acceptable    Comments: Blood pressure 114/58, pulse 89, temperature 98.3 °F (36.8 °C), temperature source Temporal, resp. rate 18, SpO2 96 %, not currently breastfeeding.    Patient discharged from PACU based upon Veronica score. Please see RN notes for further details

## 2023-03-15 NOTE — OP NOTE
OPERATIVE NOTE  3/15/2023    NAME: Earline GOINS    YOB: 1950  MRN: [id number]    PRE-OPERATIVE DIAGNOSIS:    Sleep disturbance [G47.9]  Obstructive sleep apnea syndrome [G47.33]  Daytime somnolence [R40.0]  Snoring [R06.83]    POST-OPERATIVE DIAGNOSIS:   Post-Op Diagnosis Codes:     * Sleep disturbance [G47.9]     * Obstructive sleep apnea syndrome [G47.33]     * Daytime somnolence [R40.0]     * Snoring [R06.83]    PROCEDURE PERFORMED:   12th cranial nerve (hypoglossal) stimulation implant with placement of chest wall respiratory sensor (CPT 35632).    SURGEON:   Enoch Naik MD    ASSISTANT(S):   None    ANESTHESIA:   General Anesthesia via Endotracheal Tube    INDICATIONS: The patient is a 73 y.o. female with Sleep disturbance [G47.9]  Obstructive sleep apnea syndrome [G47.33]  Daytime somnolence [R40.0]  Snoring [R06.83]    PROCEDURE:  The patient was brought to the operating room, given General Anesthesia via Endotracheal Tube, and prepped and draped in the usual manner.     Prior to prepping and draping, electrodes were placed in the genioglossus and hyoglossus muscle and connected to the NIM box for intraoperative nerve monitoring.  The patient was subsequently prepped and draped in the usual fashion.    A modified sub-mandibular incision was made in the right upper neck approximately 2 cm below the mandible. Dissection was carried down through the subcutaneous tissue and platysma. The anterior/inferior border of the submandibular gland was identified as well as the digastric tendon. The submandibular gland and the overlying fascia with the marginal mandibular nerve were retracted posteriorly. The digastric tendon was retracted inferiorly. Dissection continued down into the digastric triangle and the posterior border of the mylohyoid muscle was freed up and retracted anteriorly. With balanced retraction, the hypoglossal nerve was identified in its usual fashion and was dissected up  towards the floor of the mouth. The superior/posterior branches innervating the hyoglossus muscle were identified using the NIM stimulator and anatomical cues. The cuff electrode for the hypoglossal nerve stimulator was placed distally to these branches innervating genioglossus, transverse, and vertical muscles. The stimulation lead was anchored to the digastric tendon using two 3.0 silk sutures and lead body slack between the cuff and the anchor gently tucked deep to the submandibular gland.    A second 5 cm incision was made in the right upper chest over the second intercostal space, approximately 3cm lateral to the sternal margin. Dissection was carried down through the skin and subcutaneous tissue to the fascia of the pectoralis muscle. An inferior pocket for the generator was created deep to the subcutaneous layer and superficial to the fascia of the pectoralis muscle. The pectoralis major fascia was dissected directly over the second intercostal space with subsequent blunt dissection through the muscle. The pectoralis major/minor was then retracted to expose the fatty layer just superficial to the external intercostals. The fatty layer was carefully swept away to expose the external intercostal muscles. A throw-down base knot was placed to the fascia of the external intercostals just lateral to the anterior external membrane using 3.0 silk suture. A fasciotomy through the external intercostals was performed approximately 5 mm lateral to the suture knot and the respiratory sense lead (; ) was advanced with the sensor facing the pleura into the interfascial plane between the external and internal intercostals. The primary anchor was sutured into place with 3.0 silk on the external intercostals. The secondary anchor was sutured with 3.0 silk to the pectoralis major allowing adequate slack between the anchors.    The stimulation lead was then tunneled in a subplatysmal plane with blunt dissection under  direct visualization and brought out into the sub-clavicular pocket where both the stimulation lead and the respiratory sensing lead were connected to the implantable pulse generator, using the two person, three-handed approach.    The implantable pulse generator was placed in the subclavicular pocket ensuring lead body was deep to the generator and secured with use of air knots to the pectoralis fascia using 2.0 silk sutures.  Diagnostic evaluation confirmed good placement of the stimulation cuff as demonstrated by activation of the genioglossus and transverse and vertical muscles, resulting in unhindered, stiffened tongue protrusion, confirmed visually. Diagnostic evaluation also confirmed good respiratory sensor placement as demonstrated by a sensing waveform with good rise and fall associated with patient respirations.    All the wounds were thoroughly irrigated and closed in three layers with deep 4-0 Monocryl in a running subcuticular stitch of 4-0 Monocryl to reapproximate the epidermis. Mastisol and Steri-Strips were applied followed by Bactroban ointment and sterile pressure dressings consisting of 4 x 4's and Tegaderm.     The patient tolerated the procedure well and was transported upon extubation to the postanesthesia care unit in stable condition.    SPECIMENS:  None    COMPLICATIONS: NONE    ESTIMATED BLOOD LOSS:  Less than 25 cc    Enoch Naik MD  3/15/2023

## 2023-03-15 NOTE — ANESTHESIA PREPROCEDURE EVALUATION
Anesthesia Evaluation     Patient summary reviewed and Nursing notes reviewed   no history of anesthetic complications:  NPO Solid Status: > 8 hours             Airway   Mallampati: I  TM distance: >3 FB  Neck ROM: full  No difficulty expected  Dental          Pulmonary    (+) a smoker Former, COPD, sleep apnea,   Cardiovascular   Exercise tolerance: good (4-7 METS)    (+) valvular problems/murmurs (PFO s/p closure), hyperlipidemia,       Neuro/Psych  (-) seizures, TIA, CVA  GI/Hepatic/Renal/Endo    (+)  GERD,    (-) liver disease, no renal disease, diabetes    Musculoskeletal     Abdominal    Substance History      OB/GYN          Other                          Anesthesia Plan    ASA 2     general     intravenous induction     Anesthetic plan, risks, benefits, and alternatives have been provided, discussed and informed consent has been obtained with: patient.        CODE STATUS:

## 2023-03-15 NOTE — ANESTHESIA PROCEDURE NOTES
Airway  Urgency: elective    Date/Time: 3/15/2023 8:00 AM  Airway not difficult    General Information and Staff    Patient location during procedure: OR  CRNA/CAA: Pk Billings CRNA    Indications and Patient Condition  Indications for airway management: airway protection    Preoxygenated: yes  Mask difficulty assessment: 0 - not attempted    Final Airway Details  Final airway type: endotracheal airway      Successful airway: ETT  Cuffed: yes   Successful intubation technique: video laryngoscopy  Facilitating devices/methods: intubating stylet  Endotracheal tube insertion site: oral  Blade: [place]  Blade size: 3  ETT size (mm): 7.0  Cormack-Lehane Classification: grade I - full view of glottis  Placement verified by: chest auscultation and capnometry   Cuff volume (mL): 6  Measured from: lips  ETT/EBT  to lips (cm): 22  Number of attempts at approach: 1  Assessment: lips, teeth, and gum same as pre-op and atraumatic intubation    Additional Comments  Patient had limited ROM with neck and a small mouth opening. Grade IV with [place] 2, Grade I with [place] 3.

## 2023-03-16 LAB
QT INTERVAL: 392 MS
QTC INTERVAL: 425 MS

## 2023-03-17 ENCOUNTER — OFFICE VISIT (OUTPATIENT)
Dept: WOUND CARE | Facility: HOSPITAL | Age: 73
End: 2023-03-17
Payer: MEDICARE

## 2023-03-17 DIAGNOSIS — Z48.89 ENCOUNTER FOR OTHER SPECIFIED SURGICAL AFTERCARE: ICD-10-CM

## 2023-03-17 DIAGNOSIS — L08.9 LOCAL INFECTION OF THE SKIN AND SUBCUTANEOUS TISSUE, UNSPECIFIED: ICD-10-CM

## 2023-03-17 DIAGNOSIS — G47.9 SLEEP DISORDER, UNSPECIFIED: ICD-10-CM

## 2023-03-17 DIAGNOSIS — G47.33 OBSTRUCTIVE SLEEP APNEA (ADULT) (PEDIATRIC): ICD-10-CM

## 2023-03-17 PROCEDURE — G0463 HOSPITAL OUTPT CLINIC VISIT: HCPCS

## 2023-03-24 ENCOUNTER — OFFICE VISIT (OUTPATIENT)
Dept: WOUND CARE | Facility: HOSPITAL | Age: 73
End: 2023-03-24
Payer: MEDICARE

## 2023-03-24 DIAGNOSIS — G47.9 SLEEP DISORDER, UNSPECIFIED: ICD-10-CM

## 2023-03-24 DIAGNOSIS — Z48.89 ENCOUNTER FOR OTHER SPECIFIED SURGICAL AFTERCARE: ICD-10-CM

## 2023-03-24 DIAGNOSIS — L08.9 LOCAL INFECTION OF THE SKIN AND SUBCUTANEOUS TISSUE, UNSPECIFIED: ICD-10-CM

## 2023-03-24 DIAGNOSIS — G47.33 OBSTRUCTIVE SLEEP APNEA (ADULT) (PEDIATRIC): ICD-10-CM

## 2023-03-24 PROCEDURE — G0463 HOSPITAL OUTPT CLINIC VISIT: HCPCS

## 2023-04-05 NOTE — PROGRESS NOTES
YOB: 1950  Location: [facility]  Location Address: [address] [place] [address], [facility], [address] [place] [address]  Location Phone: [phone]    Chief Complaint   Patient presents with   • Sleep Apnea       History of Present Illness  Earline GOINS is a 73 y.o. female.  Earline GOINS is status post 12th cranial nerve (hypoglossal) stimulation implant with placement of chest wall respiratory sensor on 3/15/23. Patient has an appointment with SCOT Rivas on 23 for activation. Patient is having tightness of her neck and chin area with asymmetry of the chin. She has had two sutures come through the incisions.     23 Maribel 5       Past Medical History:   Diagnosis Date   • Allergic rhinitis    • Arthralgia    • Arthritis    • Bronchitis    • Cataract    • COPD (chronic obstructive pulmonary disease)    • Fall    • Family history of colonic polyps    • GERD (gastroesophageal reflux disease)    • Hammer toe    • History of adenomatous polyp of colon    • History of colon polyps    • Hyperlipidemia    • Nail avulsion, toe    • Psoriasis    • Schatzki's ring    • Sinusitis I don't know   • Sleep apnea     c-pap,    • Swelling    • UTI (urinary tract infection)    • Vaginal bleeding        Past Surgical History:   Procedure Laterality Date   • APPENDECTOMY     • COLONOSCOPY  03/10/2016    Two 4-6mm tubular adenomatous polyps in the descending colon and in the transverse colon; The examination was otherwise normal on direct and retroflexion views; Repeat 5 years   • COLONOSCOPY  03/15/2012    One 2-3mm mixed tubulo-hyperplastic polyp in the cecum; One 6mm mixed tubulo-hyperplastic polyp in the hepatic flexure; One 6mm tubular adenomatous polyp in the transverse colon; One 6mm mixed tubulo-hyperplastic polyp in the descending colon; One 6mm hyperplastic polyp in the sigmoid colon; Fiver less than 6mm hyperplastic polyps in the rectum; Repeat 4 years   • COLONOSCOPY  2007    Diverticulosis;  One 6-7mm hyperplastic polyp in the ascending colon; Repeat 5 years   • COLONOSCOPY N/A 04/29/2021    Procedure: COLONOSCOPY WITH ANESTHESIA;  Surgeon: Xochitl Reyes MD;  Location: [facility] ENDOSCOPY;  Service: Gastroenterology;  Laterality: N/A;  pre: hx polyps  post: polyps  Lucy Art DO   • ENDOSCOPY N/A 05/12/2017    Non-obstructing Schatzki ring-dilated; Normal stomach; Normal examined duodenum; No specimens collected   • ENDOSCOPY  03/10/2016    Small HH; Low grade of narrowing Schatzki ring-dilated; Normal stomach; Normal examined duodenum; No specimens collected   • ENDOSCOPY  03/15/2012    HH; Schatzki ring-dilated to 19mm   • ENDOSCOPY  08/05/2005    Normal endoscopy; GERD by history    • ENDOSCOPY N/A 04/29/2021    Procedure: ESOPHAGOGASTRODUODENOSCOPY WITH ANESTHESIA;  Surgeon: Xochitl Reyes MD;  Location: [facility] ENDOSCOPY;  Service: Gastroenterology;  Laterality: N/A;  pre: dysphagia  post:hiatal hernia. schatzki's ring. dilated with balloon.  Lucy Art DO   • ENDOSCOPY N/A 07/14/2022    Procedure: ESOPHAGOGASTRODUODENOSCOPY WITH ANESTHESIA;  Surgeon: Xochitl Reyes MD;  Location: [facility] ENDOSCOPY;  Service: Gastroenterology;  Laterality: N/A;  pre: dysphagia  post: schatzki's ring. balloon dilation.   Lucy Art DO       • EYE SURGERY  having cataract surgery 04/12/23   • HAMMER TOE REPAIR Right 11/06/2018    Procedure: HAMMERTOE REPAIR WITH PINNING 2nd DIGIT, CARTIVA IMPLANT;  Surgeon: Pk East DPM;  Location: [facility] OR;  Service: Podiatry   • HYPOGLOSSAL NERVE STIMULATION DEVICE IMPLANT N/A 03/15/2023    Procedure: HYPOGLOSSAL NERVE STIMULATION DEVICE IMPLANT;  Surgeon: Enoch Naik MD;  Location: [facility] OR;  Service: ENT;  Laterality: N/A;   • HYSTERECTOMY     • KNEE ARTHROSCOPY W/ MENISCAL REPAIR Left    • PATENT FORAMEN OVALE CLOSURE  2014   • TOE NAIL AVULSION Right 11/06/2018    Procedure: TOE NAIL AVULSION WITH NAIL BIOPSY- RIGHT  FOOT;  Surgeon: Pk East DPM;  Location: [facility];  Service: Podiatry   • TONSILLECTOMY         Outpatient Medications Marked as Taking for the 4/6/23 encounter (Office Visit) with Enoch Naik MD   Medication Sig Dispense Refill   • Apoaequorin (Prevagen) 10 MG capsule Take  by mouth.     • buPROPion XL (WELLBUTRIN XL) 300 MG 24 hr tablet Take 1 tablet by mouth Every Morning.     • Calcium Citrate-Vitamin D (Citrus Calcium/Vitamin D) 200-6.25 MG-MCG tablet Take  by mouth.     • fluticasone (FLONASE) 50 MCG/ACT nasal spray 2 sprays into the nostril(s) as directed by provider Daily. 1 bottle 6   • hydroxychloroquine (PLAQUENIL) 200 MG tablet Take 1 tablet by mouth Daily.     • Multiple Vitamins-Minerals (CENTRUM SILVER 50+WOMEN PO) Take 1 tablet by mouth Daily.     • pantoprazole (PROTONIX) 40 MG EC tablet Take 1 tablet by mouth Daily.     • polyethyl glycol-propyl glycol (SYSTANE) 0.4-0.3 % solution ophthalmic solution (artificial tears) Every 1 (One) Hour As Needed.     • pramipexole (MIRAPEX) 1 MG tablet Take 1 tablet by mouth 2 (two) times a day. Takes 1/2 tablet at a.m dose     • Specialty Vitamins Products (VITAMINS FOR HAIR PO) Take 1 capsule by mouth Daily. Hair growth support     • traZODone (DESYREL) 50 MG tablet Take 1 tablet by mouth As Needed.     • triamcinolone (KENALOG) 0.025 % cream Apply  topically to the appropriate area as directed 2 (Two) Times a Day. 15 g 0       Patient has no known allergies.    Family History   Problem Relation Age of Onset   • Colon polyps Mother         Unknown age    • Heart disease Mother    • Cancer Mother         Breast Cancer   • Diabetes Mother    • Heart failure Mother         open heart surgery   • Hypertension Mother         Pulmonary Hypertension   • Stroke Mother         while having heart cath   • Heart disease Father    • Cancer Father         Skin Cancer   • Heart failure Father         open heart surgery   • Stroke Father         Stroke   •  Rashes / Skin problems Father         skin cancer   • Thyroid cancer Sister    • Leukemia Sister    • Osteoarthritis Maternal Grandmother         grandmother   • Rectal cancer Maternal Grandfather         In late 70's/early 80's    • Cancer Maternal Grandfather         Rectal Cancer   • Cancer Paternal Grandmother         Skin Cancer   • Osteoarthritis Maternal Aunt         Aunt   • Cancer Sister         thyroid cancer   • Thyroid disease Sister         thyroid cancer   • Colon cancer Neg Hx    • Esophageal cancer Neg Hx    • Liver cancer Neg Hx    • Liver disease Neg Hx    • Stomach cancer Neg Hx        Social History     Socioeconomic History   • Marital status:    Tobacco Use   • Smoking status: Former     Packs/day: 1.50     Years: 38.00     Pack years: 57.00     Types: Cigarettes     Start date: 3/1/1984     Quit date: 2006     Years since quittin.7   • Smokeless tobacco: Never   • Tobacco comments:     not sure when I started or quite but dates are close   Vaping Use   • Vaping Use: Never used   Substance and Sexual Activity   • Alcohol use: Yes     Alcohol/week: 2.0 - 4.0 standard drinks     Types: 2 - 4 Glasses of wine per week     Comment: Occasionally    • Drug use: Never   • Sexual activity: Defer       Review of Systems   Constitutional: Negative.    HENT:        Tightness and asymmetry of chin and neck   Eyes: Negative.    Respiratory: Positive for apnea.    Cardiovascular: Negative.    Gastrointestinal: Negative.    Endocrine: Negative.    Genitourinary: Negative.    Musculoskeletal: Negative.    Skin: Negative.    Allergic/Immunologic: Negative.    Neurological: Negative.    Hematological: Negative.    Psychiatric/Behavioral: Negative.        Vitals:    23 1059   BP: 141/68   Pulse: 71   Resp: 16   Temp: 97.5 °F (36.4 °C)       Body mass index is 25.09 kg/m².    Objective     Physical Exam  CONSTITUTIONAL: well nourished, well-developed, alert, oriented, in no acute distress      COMMUNICATION AND VOICE: able to communicate normally, normal voice quality    HEAD: normocephalic, no lesions, atraumatic, no tenderness, no masses     FACE: appearance normal, no lesions, no tenderness, no deformities, facial motion symmetric except for mild asymmetry on pucker and smiling on the right.  Mild grade 1    EYES: ocular motility normal, eyelids normal, orbits normal, no proptosis, conjunctiva normal , pupils equal, round     EARS:  Hearing: hearing to conversational voice intact bilaterally   External Ears: normal bilaterally, no lesions    NOSE:  External Nose: external nasal structure normal, no tenderness on palpation, no nasal discharge, no lesions, no evidence of trauma, nostrils patent     ORAL:  Lips: upper and lower lips without lesion     NECK:  Inspection and Palpation: neck appearance normal, no masses or tenderness: well-healed incision right level I neck with no significant asymmetry in terms of edema or erythema compared to the left neck    CHEST/RESPIRATORY: normal respiratory effort: Chest incision is well-healed without edema or erythema or tenderness.    CARDIOVASCULAR: no cyanosis or edema     NEUROLOGICAL/PSYCHIATRIC: oriented to time, place and person, mood normal, affect appropriate, CN II-XII intact grossly    Assessment & Plan   Diagnoses and all orders for this visit:    1. Obstructive sleep apnea syndrome (Primary)  Comments:  Status post inspire implant      * Surgery not found *  No orders of the defined types were placed in this encounter.    Return in about 4 months (around 8/6/2023).       Patient Instructions   Keep appointment for activation  Call return for problems  Follow-up in 3 to 4 months

## 2023-04-06 ENCOUNTER — OFFICE VISIT (OUTPATIENT)
Dept: OTOLARYNGOLOGY | Facility: CLINIC | Age: 73
End: 2023-04-06
Payer: MEDICARE

## 2023-04-06 VITALS
DIASTOLIC BLOOD PRESSURE: 68 MMHG | WEIGHT: 165 LBS | BODY MASS INDEX: 25.01 KG/M2 | SYSTOLIC BLOOD PRESSURE: 141 MMHG | HEIGHT: 68 IN | RESPIRATION RATE: 16 BRPM | TEMPERATURE: 97.5 F | HEART RATE: 71 BPM

## 2023-04-06 DIAGNOSIS — G47.33 OBSTRUCTIVE SLEEP APNEA SYNDROME: Primary | ICD-10-CM

## 2023-04-06 PROCEDURE — 99024 POSTOP FOLLOW-UP VISIT: CPT | Performed by: OTOLARYNGOLOGY

## 2023-04-06 PROCEDURE — 1159F MED LIST DOCD IN RCRD: CPT | Performed by: OTOLARYNGOLOGY

## 2023-04-06 PROCEDURE — 1160F RVW MEDS BY RX/DR IN RCRD: CPT | Performed by: OTOLARYNGOLOGY

## 2023-04-20 ENCOUNTER — OFFICE VISIT (OUTPATIENT)
Dept: NEUROLOGY | Age: 73
End: 2023-04-20

## 2023-04-20 VITALS
SYSTOLIC BLOOD PRESSURE: 116 MMHG | HEIGHT: 68 IN | BODY MASS INDEX: 24.25 KG/M2 | HEART RATE: 82 BPM | DIASTOLIC BLOOD PRESSURE: 65 MMHG | OXYGEN SATURATION: 95 % | WEIGHT: 160 LBS

## 2023-04-20 DIAGNOSIS — Z45.42 ENCOUNTER FOR ADJUSTMENT AND MANAGEMENT OF NEUROSTIMULATOR: ICD-10-CM

## 2023-04-20 DIAGNOSIS — G47.33 OBSTRUCTIVE SLEEP APNEA: Primary | ICD-10-CM

## 2023-04-21 LAB — SARS-COV-2 N GENE RESP QL NAA+PROBE: NOT DETECTED

## 2023-04-26 PROBLEM — Z45.42 ENCOUNTER FOR ADJUSTMENT AND MANAGEMENT OF NEUROSTIMULATOR: Status: ACTIVE | Noted: 2023-04-26

## 2023-04-26 PROBLEM — G47.33 OBSTRUCTIVE SLEEP APNEA: Status: ACTIVE | Noted: 2023-04-26

## 2023-05-04 ENCOUNTER — TELEPHONE (OUTPATIENT)
Dept: NEUROLOGY | Age: 73
End: 2023-05-04

## 2023-05-04 NOTE — TELEPHONE ENCOUNTER
Tried calling patient to check in with her after 2 weeks of her Inspire device being activated, asked patient to call me back at [phone].

## 2023-05-18 ENCOUNTER — HOSPITAL ENCOUNTER (OUTPATIENT)
Dept: CT IMAGING | Age: 73
Discharge: HOME | End: 2023-05-18

## 2023-05-18 DIAGNOSIS — R94.2 DIFFUSION CAPACITY OF LUNG (DL), DECREASED: ICD-10-CM

## 2023-05-30 ENCOUNTER — OFFICE VISIT (OUTPATIENT)
Dept: PULMONOLOGY | Age: 73
End: 2023-05-30
Payer: MEDICARE

## 2023-05-30 ENCOUNTER — HOSPITAL ENCOUNTER (OUTPATIENT)
Dept: PULMONOLOGY | Age: 73
Discharge: HOME | End: 2023-05-30
Payer: MEDICARE

## 2023-05-30 VITALS — WEIGHT: 160 LBS | HEART RATE: 73 BPM | OXYGEN SATURATION: 98 % | HEIGHT: 68 IN | BODY MASS INDEX: 24.25 KG/M2

## 2023-05-30 VITALS
SYSTOLIC BLOOD PRESSURE: 140 MMHG | DIASTOLIC BLOOD PRESSURE: 68 MMHG | OXYGEN SATURATION: 97 % | BODY MASS INDEX: 24.59 KG/M2 | WEIGHT: 166 LBS | HEIGHT: 69 IN | HEART RATE: 71 BPM | TEMPERATURE: 97.4 F

## 2023-05-30 DIAGNOSIS — R05.3 CHRONIC COUGH: ICD-10-CM

## 2023-05-30 DIAGNOSIS — Z77.22 SECOND HAND SMOKE EXPOSURE: ICD-10-CM

## 2023-05-30 DIAGNOSIS — R94.2 DIFFUSION CAPACITY OF LUNG (DL), DECREASED: ICD-10-CM

## 2023-05-30 DIAGNOSIS — J84.10 LUNG GRANULOMA (HCC): ICD-10-CM

## 2023-05-30 DIAGNOSIS — G47.33 OBSTRUCTIVE SLEEP APNEA: Primary | ICD-10-CM

## 2023-05-30 DIAGNOSIS — K21.9 GASTROESOPHAGEAL REFLUX DISEASE WITHOUT ESOPHAGITIS: ICD-10-CM

## 2023-05-30 PROCEDURE — 94060 EVALUATION OF WHEEZING: CPT

## 2023-05-30 PROCEDURE — 1036F TOBACCO NON-USER: CPT | Performed by: INTERNAL MEDICINE

## 2023-05-30 PROCEDURE — G8420 CALC BMI NORM PARAMETERS: HCPCS | Performed by: INTERNAL MEDICINE

## 2023-05-30 PROCEDURE — G8427 DOCREV CUR MEDS BY ELIG CLIN: HCPCS | Performed by: INTERNAL MEDICINE

## 2023-05-30 PROCEDURE — 6370000000 HC RX 637 (ALT 250 FOR IP): Performed by: INTERNAL MEDICINE

## 2023-05-30 PROCEDURE — 3017F COLORECTAL CA SCREEN DOC REV: CPT | Performed by: INTERNAL MEDICINE

## 2023-05-30 PROCEDURE — 94060 EVALUATION OF WHEEZING: CPT | Performed by: INTERNAL MEDICINE

## 2023-05-30 PROCEDURE — 94727 GAS DIL/WSHOT DETER LNG VOL: CPT

## 2023-05-30 PROCEDURE — 94729 DIFFUSING CAPACITY: CPT | Performed by: INTERNAL MEDICINE

## 2023-05-30 PROCEDURE — 94727 GAS DIL/WSHOT DETER LNG VOL: CPT | Performed by: INTERNAL MEDICINE

## 2023-05-30 PROCEDURE — 1090F PRES/ABSN URINE INCON ASSESS: CPT | Performed by: INTERNAL MEDICINE

## 2023-05-30 PROCEDURE — 94726 PLETHYSMOGRAPHY LUNG VOLUMES: CPT

## 2023-05-30 PROCEDURE — G8399 PT W/DXA RESULTS DOCUMENT: HCPCS | Performed by: INTERNAL MEDICINE

## 2023-05-30 PROCEDURE — 99214 OFFICE O/P EST MOD 30 MIN: CPT | Performed by: INTERNAL MEDICINE

## 2023-05-30 PROCEDURE — 94729 DIFFUSING CAPACITY: CPT

## 2023-05-30 PROCEDURE — 1123F ACP DISCUSS/DSCN MKR DOCD: CPT | Performed by: INTERNAL MEDICINE

## 2023-05-30 RX ORDER — ALBUTEROL SULFATE 90 UG/1
2 AEROSOL, METERED RESPIRATORY (INHALATION) ONCE
Status: COMPLETED | OUTPATIENT
Start: 2023-05-30 | End: 2023-05-30

## 2023-05-30 RX ADMIN — ALBUTEROL SULFATE 2 PUFF: 90 AEROSOL, METERED RESPIRATORY (INHALATION) at 13:40

## 2023-05-30 ASSESSMENT — ENCOUNTER SYMPTOMS
APNEA: 1
RHINORRHEA: 0
CHEST TIGHTNESS: 0
COUGH: 0
ABDOMINAL PAIN: 0
WHEEZING: 0
ANAL BLEEDING: 0
ABDOMINAL DISTENTION: 0
BACK PAIN: 0
SHORTNESS OF BREATH: 0

## 2023-05-30 NOTE — PROGRESS NOTES
and time. This note was generated using a voice recognition software. Errors in voice recognition may have occurred. An electronic signature was used to authenticate this note.     --Klever Donis MD

## 2023-05-30 NOTE — PROCEDURES
Media Information      Pulmonary Function Study    Interpretation:    The FVC is Normal. FEV1 is Normal. FEV1/FVC ratio is Normal. After bronchodilator therapy there was no significant improvement in FEV1. Total lung capacity is Normal. Residual volume is reduced. Diffusing lung capacity when corrected for alveolar volume is mildly reduced. Impression:    Reduced diffusing lung  capacity in the absence of significant spirometric or lung volume abnormality. Differential diagnosis includes occult interstitial lung disease vs. pulmonary vascular disease.          Cara Allred MD, FCCP, [facility]

## 2023-06-01 ENCOUNTER — TELEPHONE (OUTPATIENT)
Dept: NEUROLOGY | Age: 73
End: 2023-06-01

## 2023-06-01 NOTE — TELEPHONE ENCOUNTER
I noticed patient had rescheduled her appointment. I tried reaching out to patient to let her know I was going to reschedule her appointment do to a scheduling error, had to leave a voicemail with appointment time and date.

## 2023-06-06 ENCOUNTER — TELEPHONE (OUTPATIENT)
Dept: PULMONOLOGY | Age: 73
End: 2023-06-06

## 2023-06-06 NOTE — TELEPHONE ENCOUNTER
Spoke with patient this morning regarding follow up care for jasbir. She stated she wishes to see Jayden Ramirez for her follow up care and Dr Michelet Chance for pulmonary concerns.

## 2023-06-29 ENCOUNTER — OFFICE VISIT (OUTPATIENT)
Dept: NEUROLOGY | Age: 73
End: 2023-06-29
Payer: MEDICARE

## 2023-06-29 VITALS
OXYGEN SATURATION: 98 % | HEART RATE: 80 BPM | WEIGHT: 166 LBS | HEIGHT: 69 IN | SYSTOLIC BLOOD PRESSURE: 121 MMHG | BODY MASS INDEX: 24.59 KG/M2 | DIASTOLIC BLOOD PRESSURE: 68 MMHG

## 2023-06-29 DIAGNOSIS — G47.33 OBSTRUCTIVE SLEEP APNEA: Primary | ICD-10-CM

## 2023-06-29 DIAGNOSIS — Z45.42 ENCOUNTER FOR ADJUSTMENT AND MANAGEMENT OF NEUROSTIMULATOR: ICD-10-CM

## 2023-06-29 PROCEDURE — G8427 DOCREV CUR MEDS BY ELIG CLIN: HCPCS | Performed by: PHYSICIAN ASSISTANT

## 2023-06-29 PROCEDURE — 3017F COLORECTAL CA SCREEN DOC REV: CPT | Performed by: PHYSICIAN ASSISTANT

## 2023-06-29 PROCEDURE — 1036F TOBACCO NON-USER: CPT | Performed by: PHYSICIAN ASSISTANT

## 2023-06-29 PROCEDURE — G8420 CALC BMI NORM PARAMETERS: HCPCS | Performed by: PHYSICIAN ASSISTANT

## 2023-06-29 PROCEDURE — 99215 OFFICE O/P EST HI 40 MIN: CPT | Performed by: PHYSICIAN ASSISTANT

## 2023-06-29 PROCEDURE — 1090F PRES/ABSN URINE INCON ASSESS: CPT | Performed by: PHYSICIAN ASSISTANT

## 2023-06-29 PROCEDURE — G8399 PT W/DXA RESULTS DOCUMENT: HCPCS | Performed by: PHYSICIAN ASSISTANT

## 2023-06-29 PROCEDURE — 1123F ACP DISCUSS/DSCN MKR DOCD: CPT | Performed by: PHYSICIAN ASSISTANT

## 2023-08-04 NOTE — PROGRESS NOTES
YOB: 1950  Location: [facility]  Location Address: [address] [place] [address], [facility], [address] [place] [address]  Location Phone: [phone]    Chief Complaint   Patient presents with    Sleep Apnea       History of Present Illness  Earline GOINS is a 73 y.o. female.  Earline GOINS is status post 12th cranial nerve (hypoglossal) stimulation implant with placement of chest wall respiratory sensor on 3/15/23. Patient has an appointment with SC[facility] Rivas on 23 for activation. Patient is doing well with device. She does feel that the device is close to the skin.      23 Miracle 5    23 Miracle 4             Past Medical History:   Diagnosis Date    Allergic rhinitis     Arthralgia     Arthritis     Bronchitis     Cataract     COPD (chronic obstructive pulmonary disease)     Fall     Family history of colonic polyps     GERD (gastroesophageal reflux disease)     Hammer toe     History of adenomatous polyp of colon     History of colon polyps     Hyperlipidemia     Nail avulsion, toe     Psoriasis     Schatzki's ring     Sinusitis I don't know    Sleep apnea     c-pap,     Swelling     UTI (urinary tract infection)     Vaginal bleeding        Past Surgical History:   Procedure Laterality Date    APPENDECTOMY      COLONOSCOPY  03/10/2016    Two 4-6mm tubular adenomatous polyps in the descending colon and in the transverse colon; The examination was otherwise normal on direct and retroflexion views; Repeat 5 years    COLONOSCOPY  03/15/2012    One 2-3mm mixed tubulo-hyperplastic polyp in the cecum; One 6mm mixed tubulo-hyperplastic polyp in the hepatic flexure; One 6mm tubular adenomatous polyp in the transverse colon; One 6mm mixed tubulo-hyperplastic polyp in the descending colon; One 6mm hyperplastic polyp in the sigmoid colon; Fiver less than 6mm hyperplastic polyps in the rectum; Repeat 4 years    COLONOSCOPY  2007    Diverticulosis; One 6-7mm hyperplastic polyp in the ascending  colon; Repeat 5 years    COLONOSCOPY N/A 04/29/2021    Procedure: COLONOSCOPY WITH ANESTHESIA;  Surgeon: Xochitl Reyes MD;  Location: [facility] ENDOSCOPY;  Service: Gastroenterology;  Laterality: N/A;  pre: hx polyps  post: polyps  Lucy Art,     ENDOSCOPY N/A 05/12/2017    Non-obstructing Schatzki ring-dilated; Normal stomach; Normal examined duodenum; No specimens collected    ENDOSCOPY  03/10/2016    Small HH; Low grade of narrowing Schatzki ring-dilated; Normal stomach; Normal examined duodenum; No specimens collected    ENDOSCOPY  03/15/2012    HH; Schatzki ring-dilated to 19mm    ENDOSCOPY  08/05/2005    Normal endoscopy; GERD by history     ENDOSCOPY N/A 04/29/2021    Procedure: ESOPHAGOGASTRODUODENOSCOPY WITH ANESTHESIA;  Surgeon: Xochitl Reyes MD;  Location: [facility] ENDOSCOPY;  Service: Gastroenterology;  Laterality: N/A;  pre: dysphagia  post:hiatal hernia. schatzki's ring. dilated with balloon.  Lucy Art DO    ENDOSCOPY N/A 07/14/2022    Procedure: ESOPHAGOGASTRODUODENOSCOPY WITH ANESTHESIA;  Surgeon: Xochitl Reyes MD;  Location: [facility] ENDOSCOPY;  Service: Gastroenterology;  Laterality: N/A;  pre: dysphagia  post: schatzki's ring. balloon dilation.   Lucy Art DO        EYE SURGERY  having cataract surgery 04/12/23    HAMMER TOE REPAIR Right 11/06/2018    Procedure: HAMMERTOE REPAIR WITH PINNING 2nd DIGIT, CARTIVA IMPLANT;  Surgeon: Pk East DPM;  Location: [facility] OR;  Service: Podiatry    HYPOGLOSSAL NERVE STIMULATION DEVICE IMPLANT N/A 03/15/2023    Procedure: HYPOGLOSSAL NERVE STIMULATION DEVICE IMPLANT;  Surgeon: Enoch Naik MD;  Location: [facility] OR;  Service: ENT;  Laterality: N/A;    HYSTERECTOMY      KNEE ARTHROSCOPY W/ MENISCAL REPAIR Left     PATENT FORAMEN OVALE CLOSURE  2014    TOE NAIL AVULSION Right 11/06/2018    Procedure: TOE NAIL AVULSION WITH NAIL BIOPSY- RIGHT FOOT;  Surgeon: Pk East DPM;  Location: [facility] OR;   Service: Podiatry    TONSILLECTOMY         Outpatient Medications Marked as Taking for the 8/8/23 encounter (Office Visit) with Enoch Naik MD   Medication Sig Dispense Refill    Apoaequorin (Prevagen) 10 MG capsule Take  by mouth.      buPROPion XL (WELLBUTRIN XL) 300 MG 24 hr tablet Take 1 tablet by mouth Every Morning.      Calcium Citrate-Vitamin D (Citrus Calcium/Vitamin D) 200-6.25 MG-MCG tablet Take  by mouth.      fluticasone (FLONASE) 50 MCG/ACT nasal spray 2 sprays into the nostril(s) as directed by provider Daily. 1 bottle 6    hydroxychloroquine (PLAQUENIL) 200 MG tablet Take 1 tablet by mouth Daily.      levocetirizine (XYZAL) 5 MG tablet Take 1 tablet by mouth Every Evening.      Multiple Vitamins-Minerals (CENTRUM SILVER 50+WOMEN PO) Take 1 tablet by mouth Daily.      pantoprazole (PROTONIX) 40 MG EC tablet Take 1 tablet by mouth Daily.      polyethyl glycol-propyl glycol (SYSTANE) 0.4-0.3 % solution ophthalmic solution (artificial tears) Every 1 (One) Hour As Needed.      pramipexole (MIRAPEX) 1 MG tablet Take 1 tablet by mouth 2 (two) times a day. Takes 1/2 tablet at a.m dose      Specialty Vitamins Products (VITAMINS FOR HAIR PO) Take 1 capsule by mouth Daily. Hair growth support      traZODone (DESYREL) 50 MG tablet Take 1 tablet by mouth As Needed.      triamcinolone (KENALOG) 0.025 % cream Apply  topically to the appropriate area as directed 2 (Two) Times a Day. 15 g 0       Patient has no known allergies.    Family History   Problem Relation Age of Onset    Colon polyps Mother         Unknown age     Heart disease Mother     Cancer Mother         Breast Cancer    Diabetes Mother     Heart failure Mother         open heart surgery    Hypertension Mother         Pulmonary Hypertension    Stroke Mother         while having heart cath    Heart disease Father     Cancer Father         Skin Cancer    Heart failure Father         open heart surgery    Stroke Father         Stroke    Rashes /  Skin problems Father         skin cancer    Thyroid cancer Sister     Leukemia Sister     Osteoarthritis Maternal Grandmother         grandmother    Rectal cancer Maternal Grandfather         In late 70's/early 80's     Cancer Maternal Grandfather         Rectal Cancer    Cancer Paternal Grandmother         Skin Cancer    Osteoarthritis Maternal Aunt         Aunt    Cancer Sister         thyroid cancer    Thyroid disease Sister         thyroid cancer    Colon cancer Neg Hx     Esophageal cancer Neg Hx     Liver cancer Neg Hx     Liver disease Neg Hx     Stomach cancer Neg Hx        Social History     Socioeconomic History    Marital status:    Tobacco Use    Smoking status: Former     Packs/day: 1.50     Years: 38.00     Pack years: 57.00     Types: Cigarettes     Start date: 3/1/1984     Quit date: 2006     Years since quittin.1    Smokeless tobacco: Never    Tobacco comments:     not sure when I started or quite but dates are close   Vaping Use    Vaping Use: Never used   Substance and Sexual Activity    Alcohol use: Yes     Alcohol/week: 2.0 - 4.0 standard drinks     Types: 2 - 4 Glasses of wine per week     Comment: Occasionally     Drug use: Never    Sexual activity: Defer       Review of Systems   Constitutional: Negative.    HENT: Negative.     Eyes: Negative.    Respiratory: Negative.     Cardiovascular: Negative.    Gastrointestinal: Negative.    Endocrine: Negative.    Genitourinary: Negative.    Musculoskeletal: Negative.    Allergic/Immunologic: Negative.    Neurological: Negative.    Hematological: Negative.    Psychiatric/Behavioral: Negative.       Vitals:    23 1027   BP: 130/66   Pulse: 67   Resp: 16   Temp: 97.3 øF (36.3 øC)       Body mass index is 24.48 kg/mý.    Objective     Physical Exam  CONSTITUTIONAL: well nourished, well-developed, alert, oriented, in no acute distress     COMMUNICATION AND VOICE: able to communicate normally, normal voice quality    HEAD:  "normocephalic, no lesions, atraumatic, no tenderness, no masses     FACE: appearance normal, no lesions, no tenderness, no deformities, facial motion symmetric    EYES: ocular motility normal, eyelids normal, orbits normal, no proptosis, conjunctiva normal , pupils equal, round     EARS:  Hearing: hearing to conversational voice intact bilaterally   External Ears: normal bilaterally, no lesions    NOSE:  External Nose: external nasal structure normal, no tenderness on palpation, no nasal discharge, no lesions, no evidence of trauma, nostrils patent     ORAL:  Lips: upper and lower lips without lesion     NECK:  Inspection and Palpation: neck appearance normal, no masses or tenderness-well-healed incision of the right submandibular region    CHEST/RESPIRATORY: normal respiratory effort   Incision of the right chest well-healed.  The implant is palpable but is not an imminent danger extruding does not feel \"superficial\" to me.    CARDIOVASCULAR: no cyanosis or edema     NEUROLOGICAL/PSYCHIATRIC: oriented to time, place and person, mood normal, affect appropriate, CN II-XII intact grossly   Assessment & Plan     * Surgery not found *  No orders of the defined types were placed in this encounter.    Return if symptoms worsen or fail to improve.       Patient Instructions   Call return for problems  Continue to utilize implant for the treatment of apnea  Follow-up with sleep medicine as scheduled and in the future as needed  Otherwise follow-up here as needed  "

## 2023-08-08 ENCOUNTER — OFFICE VISIT (OUTPATIENT)
Dept: OTOLARYNGOLOGY | Facility: CLINIC | Age: 73
End: 2023-08-08
Payer: MEDICARE

## 2023-08-08 VITALS
TEMPERATURE: 97.3 F | DIASTOLIC BLOOD PRESSURE: 66 MMHG | HEIGHT: 68 IN | RESPIRATION RATE: 16 BRPM | WEIGHT: 161 LBS | BODY MASS INDEX: 24.4 KG/M2 | SYSTOLIC BLOOD PRESSURE: 130 MMHG | HEART RATE: 67 BPM

## 2023-08-08 DIAGNOSIS — G47.33 OBSTRUCTIVE SLEEP APNEA SYNDROME: Primary | ICD-10-CM

## 2023-08-08 RX ORDER — LEVOCETIRIZINE DIHYDROCHLORIDE 5 MG/1
5 TABLET, FILM COATED ORAL EVERY EVENING
COMMUNITY

## 2023-08-08 NOTE — PATIENT INSTRUCTIONS
Call return for problems  Continue to utilize implant for the treatment of apnea  Follow-up with sleep medicine as scheduled and in the future as needed  Otherwise follow-up here as needed

## 2023-08-10 ENCOUNTER — OFFICE VISIT (OUTPATIENT)
Dept: NEUROLOGY | Age: 73
End: 2023-08-10

## 2023-08-10 VITALS
DIASTOLIC BLOOD PRESSURE: 60 MMHG | HEART RATE: 70 BPM | BODY MASS INDEX: 25.16 KG/M2 | HEIGHT: 68 IN | OXYGEN SATURATION: 98 % | SYSTOLIC BLOOD PRESSURE: 114 MMHG | WEIGHT: 166 LBS

## 2023-08-10 DIAGNOSIS — G47.33 OBSTRUCTIVE SLEEP APNEA: Primary | ICD-10-CM

## 2023-08-10 DIAGNOSIS — Z45.42 ENCOUNTER FOR ADJUSTMENT AND MANAGEMENT OF NEUROSTIMULATOR: ICD-10-CM

## 2023-08-10 NOTE — PROGRESS NOTES
REVIEW OF SYSTEMS    Constitutional: []Fever []Sweats []Chills [] Recent Injury   [x] Denies all unless marked  HENT:[]Headache  [] Head Injury  [] Sore Throat  [] Ear Pain  [] Dizziness [] Hearing Loss   [x] Denies all unless marked  Musculoskeletal: [] Arthralgia  [] Myalgias [] Muscle cramps  [] Muscle twitches   [x] Denies all unless marked   Spine:  [] Neck pain  [] Back pain  [] Sciatica  [x] Denies all unless marked  Neurological:[] Visual Disturbance [] Double Vision [] Slurred Speech [] Trouble swallowing  [] Vertigo [] Tingling [] Numbness [] Weakness [] Loss of Balance   [] Loss of Consciousness [] Memory Loss [] Seizures  [x] Denies all unless marked  Psychiatric/Behavioral:[] Depression [] Anxiety  [x] Denies all unless marked  Sleep: []  Insomnia [] Sleep Disturbance [] Snoring [] Restless Legs [] Daytime Sleepiness [x] Sleep Apnea  [] Denies all unless marked
night used: 5.5 hours    Incoming Amplitude: 1.9 volts  Incoming Patient Control Range: 1.7 volts to 2.7 volts  Start Delay: 40 minutes  Pause Time: 15 minutes  Therapy Duration: 8    Pulse width:  120  Rate:  40 Hz  Electrodes (A)     Parameters modified: ( pt control, pulse width, rate, start delay, and pause time ) 8/10/2023     Stimulation settings:    Patient control lower limit: 1.3 volts  Patient control upper limit: 2.3 volts  Pulse width: changed to 90  Rate: changed to 33 Hz  Electrodes: changed to (C)     Start Delay: changed to 50 minutes  Pause time: changed to 30 minutes  Therapy duration: 8 hours- no change    I reviewed the following studies:       []  :  Clinical laboratory test results     []  :  Radiology reports                    [x]  :  Review and summarization of medical records-Inspire utilization data; configurations as noted above      []     Request for medical records       []  :  Reviewed previous/recent polysomnogram report(s)      [x]  :  Tram Sleepiness Scale: 4    Tram Sleepiness Scale    Rate the likelihood of dozin = would never doze-\"never\"  1 = slight chance of dozing-\"rarely\"  2 = moderate chance of dozing-\"sometimes\"  3 = high chance of dozing-\"always\"    Situation Chance of Dozing (0-3)    Sitting and reading       1    Watching TV        1    Sitting, inactive in a public place (e.g. a theatre or a meeting) 0    As a passenger in a car for an hour without a break   1    Lying down to rest in the afternoon when circumstances permit 1    Sitting and talking to someone     0    Sitting quietly after lunch without alcohol    0    In a car, while stopped for a few minutes in the traffic  0    Total             Previous ESS score:  (2023)        NOTE: The total ESS score can range from 0 to 24, with higher scores correlating with increasing degrees of sleepiness. A score greater than 10 is consistent with excessive sleepiness.          Assessment:

## 2023-08-28 ENCOUNTER — TELEPHONE (OUTPATIENT)
Dept: NEUROLOGY | Age: 73
End: 2023-08-28

## 2023-08-28 ENCOUNTER — HOSPITAL ENCOUNTER (OUTPATIENT)
Dept: SLEEP CENTER | Age: 73
Discharge: HOME | End: 2023-08-30
Payer: MEDICARE

## 2023-08-28 PROCEDURE — 95810 POLYSOM 6/> YRS 4/> PARAM: CPT

## 2023-08-28 RX ORDER — PANTOPRAZOLE SODIUM 40 MG/1
40 TABLET, DELAYED RELEASE ORAL
Qty: 60 TABLET | Refills: 11 | Status: SHIPPED | OUTPATIENT
Start: 2023-08-28

## 2023-08-28 NOTE — TELEPHONE ENCOUNTER
I tried to contact patient to discuss how well she is tolerating Inspire therapy, pre-fine tune Inspire titration study. No answer on land line or cell. Review of the Sleep Sync data looks good. She is averaging 67% nights used >4 hours. Improved from, 8/10/2023 at 50%  Total nights used: 80%. Note: parameters modified due to her c/o pain at the base of her tongue at the last office visit, 8/10/2023.      Parameters modified: ( pt control, pulse width, rate, start delay, and pause time ) 8/10/2023     Stimulation settings:     Patient control lower limit: 1.3 volts  Patient control upper limit: 2.3 volts  Pulse width: changed to 90  Rate: changed to 33 Hz  Electrodes: changed to (C)     Start Delay: changed to 50 minutes  Pause time: changed to 30 minutes  Therapy duration: 8 hours- no change

## 2023-08-29 NOTE — PROGRESS NOTES
[facility]  [address]  Phone [phone] Fax [phone]     Sleep Study Technician Review    Patient Name:  Selina Leal  :   1950  Referring Provider: JESUS Aldana    Brief History:  Selina Leal is a 68 y.o. female with a history of Depression, RLS and FISH (has an Inspire placed) who has been referred for a Inspire Sleep study. She is using Inspire nightly up to most nights up to 5.5 hours per night. She reports that her tongue is still bothering her. She has pain at the base of her tongue. When she wakes up she can't get bact to sleep as it takes her awhile to fall back to sleep. She has notice improvement in her FISH symptoms if she uses it 7 hours. She does feel better. She is happy with it. She hasn't used it consistently over the last month because she has had a hard time going to sleep as she is working extremely hard in her yard and around the house. Height:   5' 8\"  Weight:  166 lbs  BMI: 24.8  Neck Circ: 14\"  Mallampati 4  ESS:     Type of Study: PSG  Inspire  Time Stage Position Snore Hypopnea Obs Apnea Jarred Apnea Inspire O2   2200 2 Supine No No No No 1.2V RA   2300 REM Supine No Yes No No 1.2V RA   2400 2 Supine No No No No 1.3V RA   0100 2 Supine No No No No 1.4V RA   0200 REM Supine No No No No 1.4V RA   0300 rem Supine No No No No 1.4V RA   0400 Awake Supine No No No No 1.4V RA           RA              Summary: Pt was titrated back to 1.4V to eliminate respiratory events. Pt voiced no complaints. The study was reviewed briefly with Selina Leal. She will be notified of the formal results and recommendations after the study is scored and interpreted. The report will be sent to his referring provider.     Technician:  RADHA Reed

## 2023-10-23 ENCOUNTER — TELEPHONE (OUTPATIENT)
Dept: NEUROLOGY | Age: 73
End: 2023-10-23

## 2023-10-23 NOTE — TELEPHONE ENCOUNTER
Radha Bell called to reschedule a  office visit she had on 10/26. Rescheduled to next available on 1/17. Pt. Is rescheduled due to covid positive. Pt. Is concern because it's a followup for her insprire. Advised I could schedule, add to waitilist and send message if appt. Needed to be sooner . Please be advised that the best time to call her to accommodate their needs is Anytime. Thank you.

## 2023-12-07 ENCOUNTER — OFFICE VISIT (OUTPATIENT)
Dept: NEUROLOGY | Age: 73
End: 2023-12-07

## 2023-12-07 VITALS
BODY MASS INDEX: 24.25 KG/M2 | DIASTOLIC BLOOD PRESSURE: 67 MMHG | WEIGHT: 160 LBS | HEIGHT: 68 IN | SYSTOLIC BLOOD PRESSURE: 117 MMHG | HEART RATE: 85 BPM | OXYGEN SATURATION: 99 %

## 2023-12-07 DIAGNOSIS — G47.33 OBSTRUCTIVE SLEEP APNEA: Primary | ICD-10-CM

## 2023-12-07 DIAGNOSIS — Z71.2 ENCOUNTER TO DISCUSS TEST RESULTS: ICD-10-CM

## 2023-12-07 NOTE — PROGRESS NOTES
[facility] Neurology and Sleep Medicine  [address] [facility], [address] [place] [address],Third Floor, [address] [place] [address]  Phone [phone]  Fax [phone]       Inspire Device Check      Information:   Patient Name: Beverly García  :   1950  Age:   68 y.o. MRN:   [id number]  Account #:  [de-identified]  Today:               23    Provider:  Jordan Grady PA-C    Chief Complaint   Patient presents with    Follow-up    Sleep Apnea    Other     S/P Inspire fine tune titration study results; review treatment plan; and reassess compliance and efficacy        Subjective:   Beverly García is a 68 y.o. female  with a history of FISH, s/p Inspire implant, 3/15/2023. The Inspire device was activated, 2023. Beverly García presents today for an James Salaam fine tune titration study follow up. Fausto Naranjo had the James Salaam fine tune titration study, 2023. She was recorded with the device off then started with an amplitude of 1.2V and titrated to 1.4V. At the final amplitude, her apneas and hypopneas were eliminated. The apnea and hypopnea index on the final amplitude was 1.3 events per hour. It was deemed a successful Inspire titration. The recommendations were continued use of the Inspire device with an amplitude of 1.4 volts to 1.5 volts. Beverly García  reports that she is using Inspire most nights >6 hours per night. She didn't use it when she had COVID. She didn't sleep much. The FISH symptoms are improved. She feels more refreshed with Inspire therapy upon awakening. She denies dysphagia, pain, or globus sensation. She  denies any tongue/throat soreness or sleep disruption. She desires to continue the start delay at 50 minutes. She desires to continue the pause time at 30 minutes. She desires to continue the duration at 8 hours.      AHI: 23.4 (2023)  ESS: 5/24 (2023)  ESS: 8 (2023)  AHI: 1.3 at 1.4 volts (fine tune titration study)       Objective:     Past Medical History:   Diagnosis Date

## 2023-12-08 ENCOUNTER — TELEPHONE (OUTPATIENT)
Dept: NEUROLOGY | Age: 73
End: 2023-12-08

## 2023-12-08 NOTE — TELEPHONE ENCOUNTER
Spoke with patient to let her know her appointment time on 6/13/24 needed to be changed. Patient is aware of new appointment time on same day.

## 2023-12-10 PROBLEM — Z71.2 ENCOUNTER TO DISCUSS TEST RESULTS: Status: ACTIVE | Noted: 2023-12-10

## 2024-01-09 PROBLEM — Z71.2 ENCOUNTER TO DISCUSS TEST RESULTS: Status: RESOLVED | Noted: 2023-12-10 | Resolved: 2024-01-09

## 2024-01-15 ENCOUNTER — TRANSCRIBE ORDERS (OUTPATIENT)
Dept: ADMINISTRATIVE | Age: 74
End: 2024-01-15

## 2024-01-15 DIAGNOSIS — Z12.31 ENCOUNTER FOR SCREENING MAMMOGRAM FOR MALIGNANT NEOPLASM OF BREAST: Primary | ICD-10-CM

## 2024-01-29 ENCOUNTER — HOSPITAL ENCOUNTER (OUTPATIENT)
Dept: WOMENS IMAGING | Age: 74
Discharge: HOME | End: 2024-01-29
Payer: MEDICARE

## 2024-01-29 DIAGNOSIS — Z12.31 ENCOUNTER FOR SCREENING MAMMOGRAM FOR MALIGNANT NEOPLASM OF BREAST: ICD-10-CM

## 2024-01-29 PROCEDURE — 77063 BREAST TOMOSYNTHESIS BI: CPT

## 2024-02-26 ENCOUNTER — TELEPHONE (OUTPATIENT)
Dept: NEUROSURGERY | Age: 74
End: 2024-02-26

## 2024-02-27 NOTE — TELEPHONE ENCOUNTER
Called patient and spoke with her about her issue with her remote. Patient changed batteries and remote is now working. Patient did not know that the batteries could be changed.

## 2024-05-08 ENCOUNTER — OFFICE VISIT (OUTPATIENT)
Dept: GASTROENTEROLOGY | Facility: CLINIC | Age: 74
End: 2024-05-08
Payer: MEDICARE

## 2024-05-08 VITALS
SYSTOLIC BLOOD PRESSURE: 118 MMHG | WEIGHT: 164 LBS | BODY MASS INDEX: 24.29 KG/M2 | DIASTOLIC BLOOD PRESSURE: 72 MMHG | TEMPERATURE: 97.1 F | HEIGHT: 69 IN | OXYGEN SATURATION: 100 % | HEART RATE: 73 BPM

## 2024-05-08 DIAGNOSIS — G47.33 OBSTRUCTIVE SLEEP APNEA SYNDROME: ICD-10-CM

## 2024-05-08 DIAGNOSIS — Z80.0 FAMILY HISTORY OF COLON CANCER: ICD-10-CM

## 2024-05-08 DIAGNOSIS — Z83.719 FH: COLON POLYPS: ICD-10-CM

## 2024-05-08 DIAGNOSIS — Z86.010 HISTORY OF ADENOMATOUS POLYP OF COLON: Primary | ICD-10-CM

## 2024-05-08 RX ORDER — MELATONIN
1000 DAILY
COMMUNITY

## 2024-05-30 ENCOUNTER — HOSPITAL ENCOUNTER (OUTPATIENT)
Dept: CT IMAGING | Age: 74
Discharge: HOME | End: 2024-05-30
Payer: MEDICARE

## 2024-05-30 DIAGNOSIS — J84.10 LUNG GRANULOMA (HCC): ICD-10-CM

## 2024-05-30 PROCEDURE — 71250 CT THORAX DX C-: CPT

## 2024-06-13 ENCOUNTER — OFFICE VISIT (OUTPATIENT)
Dept: NEUROLOGY | Age: 74
End: 2024-06-13
Payer: MEDICARE

## 2024-06-13 VITALS
OXYGEN SATURATION: 99 % | HEART RATE: 78 BPM | SYSTOLIC BLOOD PRESSURE: 123 MMHG | BODY MASS INDEX: 24.1 KG/M2 | DIASTOLIC BLOOD PRESSURE: 77 MMHG | HEIGHT: 68 IN | WEIGHT: 159 LBS

## 2024-06-13 DIAGNOSIS — G47.33 OBSTRUCTIVE SLEEP APNEA: Primary | ICD-10-CM

## 2024-06-13 DIAGNOSIS — Z45.42 ENCOUNTER FOR ADJUSTMENT AND MANAGEMENT OF NEUROSTIMULATOR: ICD-10-CM

## 2024-06-13 PROCEDURE — G8420 CALC BMI NORM PARAMETERS: HCPCS | Performed by: PHYSICIAN ASSISTANT

## 2024-06-13 PROCEDURE — 3017F COLORECTAL CA SCREEN DOC REV: CPT | Performed by: PHYSICIAN ASSISTANT

## 2024-06-13 PROCEDURE — 1036F TOBACCO NON-USER: CPT | Performed by: PHYSICIAN ASSISTANT

## 2024-06-13 PROCEDURE — G8427 DOCREV CUR MEDS BY ELIG CLIN: HCPCS | Performed by: PHYSICIAN ASSISTANT

## 2024-06-13 PROCEDURE — G8399 PT W/DXA RESULTS DOCUMENT: HCPCS | Performed by: PHYSICIAN ASSISTANT

## 2024-06-13 PROCEDURE — 1090F PRES/ABSN URINE INCON ASSESS: CPT | Performed by: PHYSICIAN ASSISTANT

## 2024-06-13 PROCEDURE — 1123F ACP DISCUSS/DSCN MKR DOCD: CPT | Performed by: PHYSICIAN ASSISTANT

## 2024-06-13 PROCEDURE — 99212 OFFICE O/P EST SF 10 MIN: CPT | Performed by: PHYSICIAN ASSISTANT

## 2024-06-13 RX ORDER — MULTIVIT-MIN/FERROUS GLUCONATE 9 MG/15 ML
15 LIQUID (ML) ORAL DAILY
COMMUNITY

## 2024-06-13 RX ORDER — PRAMIPEXOLE DIHYDROCHLORIDE 1 MG/1
1 TABLET ORAL 3 TIMES DAILY
COMMUNITY

## 2024-06-13 NOTE — PROGRESS NOTES
[facility] Neurology and Sleep Medicine  [facility], [address]  [place] [address]  Phone [phone]  Fax [phone]       Inspire Device Check      Information:   Patient Name: nAna Sanderson  :   1950  Age:   74 y.o.  MRN:   [id number]  Account #:  [id number]  Today:               24    Provider:  Marleny Rivas PA-C    Chief Complaint   Patient presents with    Inspire     Patient states no complaints.         Subjective:   Anna Sanderson is a 74 y.o. female  with a history of FISH, s/p Inspire implant, 3/15/2023. The Inspire device was activated, 2023. Anna Sanderson presents today for an Inspire device follow up.     Anna Sanderson had the Inspire fine tune titration study, 2023.   She was recorded with the device off then started with an amplitude of 1.2V and titrated to 1.4V.  At the final amplitude, her apneas and hypopneas were eliminated. The apnea and hypopnea index on the final amplitude was 1.3 events per hour.  It was deemed a successful Inspire titration. The recommendations were continued use of the Inspire device with an amplitude of 1.4 volts to 1.5 volts.     Anna Sanderson  reports that she is using Inspire nightly up to 7.5 hours per night. She is currently on level 5 (1.6 volts).  The FISH symptoms are improved. She feels more refreshed with Inspire therapy upon awakening. She denies dysphagia, pain, or globus sensation. She  denies any tongue/throat soreness or sleep disruption.     She desires to continue the start delay at 50 minutes.  She desires to continue the pause time at 30 minutes.   She desires to continue the duration at 8 hours.     AHI: 23.4 (2023)  ESS:  (2023)  ESS:  (2023)  AHI: 1.3 at 1.4 volts (fine tune titration study)       Objective:     Past Medical History:   Diagnosis Date    Depression     Restless legs syndrome     Sleep apnea     Inspire Implant       Past Surgical History:   Procedure Laterality Date

## 2024-06-13 NOTE — PROGRESS NOTES

## 2024-06-21 ENCOUNTER — ANESTHESIA (OUTPATIENT)
Dept: GASTROENTEROLOGY | Facility: HOSPITAL | Age: 74
End: 2024-06-21
Payer: MEDICARE

## 2024-06-21 ENCOUNTER — ANESTHESIA EVENT (OUTPATIENT)
Dept: GASTROENTEROLOGY | Facility: HOSPITAL | Age: 74
End: 2024-06-21
Payer: MEDICARE

## 2024-06-21 ENCOUNTER — HOSPITAL ENCOUNTER (OUTPATIENT)
Facility: HOSPITAL | Age: 74
Setting detail: HOSPITAL OUTPATIENT SURGERY
Discharge: HOME | End: 2024-06-21
Attending: INTERNAL MEDICINE | Admitting: INTERNAL MEDICINE
Payer: MEDICARE

## 2024-06-21 VITALS
WEIGHT: 163 LBS | OXYGEN SATURATION: 100 % | HEIGHT: 69 IN | DIASTOLIC BLOOD PRESSURE: 72 MMHG | SYSTOLIC BLOOD PRESSURE: 116 MMHG | TEMPERATURE: 96 F | BODY MASS INDEX: 24.14 KG/M2 | HEART RATE: 71 BPM | RESPIRATION RATE: 18 BRPM

## 2024-06-21 DIAGNOSIS — Z86.010 HISTORY OF ADENOMATOUS POLYP OF COLON: ICD-10-CM

## 2024-06-21 PROCEDURE — 45385 COLONOSCOPY W/LESION REMOVAL: CPT | Performed by: INTERNAL MEDICINE

## 2024-06-21 PROCEDURE — 88305 TISSUE EXAM BY PATHOLOGIST: CPT | Performed by: INTERNAL MEDICINE

## 2024-06-21 PROCEDURE — 25810000003 SODIUM CHLORIDE 0.9 % SOLUTION 500 ML FLEX CONT: Performed by: ANESTHESIOLOGY

## 2024-06-21 PROCEDURE — 25010000002 PROPOFOL 10 MG/ML EMULSION: Performed by: NURSE ANESTHETIST, CERTIFIED REGISTERED

## 2024-06-21 PROCEDURE — 25810000003 SODIUM CHLORIDE 0.9 % SOLUTION: Performed by: ANESTHESIOLOGY

## 2024-06-21 RX ORDER — PROPOFOL 10 MG/ML
VIAL (ML) INTRAVENOUS AS NEEDED
Status: DISCONTINUED | OUTPATIENT
Start: 2024-06-21 | End: 2024-06-21 | Stop reason: SURG

## 2024-06-21 RX ORDER — LIDOCAINE HYDROCHLORIDE 20 MG/ML
INJECTION, SOLUTION EPIDURAL; INFILTRATION; INTRACAUDAL; PERINEURAL AS NEEDED
Status: DISCONTINUED | OUTPATIENT
Start: 2024-06-21 | End: 2024-06-21 | Stop reason: SURG

## 2024-06-21 RX ORDER — SODIUM CHLORIDE 9 MG/ML
40 INJECTION, SOLUTION INTRAVENOUS AS NEEDED
Status: DISCONTINUED | OUTPATIENT
Start: 2024-06-21 | End: 2024-06-21 | Stop reason: HOSPADM

## 2024-06-21 RX ORDER — SODIUM CHLORIDE 0.9 % (FLUSH) 0.9 %
10 SYRINGE (ML) INJECTION EVERY 12 HOURS SCHEDULED
Status: DISCONTINUED | OUTPATIENT
Start: 2024-06-21 | End: 2024-06-21 | Stop reason: HOSPADM

## 2024-06-21 RX ORDER — SODIUM CHLORIDE 9 MG/ML
100 INJECTION, SOLUTION INTRAVENOUS CONTINUOUS
Status: DISCONTINUED | OUTPATIENT
Start: 2024-06-21 | End: 2024-06-21 | Stop reason: HOSPADM

## 2024-06-21 RX ORDER — SODIUM CHLORIDE 0.9 % (FLUSH) 0.9 %
10 SYRINGE (ML) INJECTION AS NEEDED
Status: DISCONTINUED | OUTPATIENT
Start: 2024-06-21 | End: 2024-06-21 | Stop reason: HOSPADM

## 2024-06-21 RX ADMIN — SODIUM CHLORIDE: 9 INJECTION, SOLUTION INTRAVENOUS at 08:12

## 2024-06-21 RX ADMIN — SODIUM CHLORIDE 40 ML: 9 INJECTION, SOLUTION INTRAVENOUS at 07:38

## 2024-06-21 RX ADMIN — LIDOCAINE HYDROCHLORIDE 50 MG: 20 INJECTION, SOLUTION EPIDURAL; INFILTRATION; INTRACAUDAL; PERINEURAL at 08:14

## 2024-06-21 RX ADMIN — PROPOFOL 450 MG: 10 INJECTION, EMULSION INTRAVENOUS at 08:14

## 2024-06-21 NOTE — ANESTHESIA POSTPROCEDURE EVALUATION
Patient: Earline GOINS    Procedure Summary       Date: 06/21/24 Room / Location: [facility] ENDOSCOPY 6 / [facility] END[facility]    Anesthesia Start: 0812 Anesthesia Stop: 0836    Procedure: COLONOSCOPY WITH ANESTHESIA Diagnosis:       History of adenomatous polyp of colon      (History of adenomatous polyp of colon [Z86.010])    Surgeons: Xochitl Reyes MD Provider: Stephanie Tellez CRNA    Anesthesia Type: MAC ASA Status: 2            Anesthesia Type: MAC    Vitals  Vitals Value Taken Time   /62 06/21/24 0851   Temp     Pulse 70 06/21/24 0852   Resp 18 06/21/24 0850   SpO2 99 % 06/21/24 0852   Vitals shown include unfiled device data.        Post Anesthesia Care and Evaluation    Patient location during evaluation: PHASE II  Patient participation: complete - patient participated  Level of consciousness: awake and alert  Pain management: adequate    Airway patency: patent  Anesthetic complications: No anesthetic complications  PONV Status: none  Cardiovascular status: acceptable  Respiratory status: acceptable  Hydration status: acceptable  No anesthesia care post op

## 2024-06-21 NOTE — H&P
Chief Complaint:   Colon polyps    Subjective     HPI:   Adenomas removed 4/2021.    Past Medical History:   Past Medical History:   Diagnosis Date    Allergic rhinitis     Arthralgia     Arthritis     Bronchitis     Cataract     COPD (chronic obstructive pulmonary disease)     Fall     Family history of colonic polyps     Family history of rectal cancer     GERD (gastroesophageal reflux disease)     Hammer toe     History of adenomatous polyp of colon     History of colon polyps     Hyperlipidemia     Nail avulsion, toe     Psoriasis     Schatzki's ring     Sinusitis I don't know    Sleep apnea     c-pap,     Swelling     UTI (urinary tract infection)     Vaginal bleeding        Past Surgical History:  Past Surgical History:   Procedure Laterality Date    APPENDECTOMY      COLONOSCOPY  03/10/2016    Two 4-6mm tubular adenomatous polyps in the descending colon and in the transverse colon; The examination was otherwise normal on direct and retroflexion views; Repeat 5 years    COLONOSCOPY  03/15/2012    One 2-3mm mixed tubulo-hyperplastic polyp in the cecum; One 6mm mixed tubulo-hyperplastic polyp in the hepatic flexure; One 6mm tubular adenomatous polyp in the transverse colon; One 6mm mixed tubulo-hyperplastic polyp in the descending colon; One 6mm hyperplastic polyp in the sigmoid colon; Fiver less than 6mm hyperplastic polyps in the rectum; Repeat 4 years    COLONOSCOPY  02/05/2007    Diverticulosis; One 6-7mm hyperplastic polyp in the ascending colon; Repeat 5 years    COLONOSCOPY N/A 04/29/2021    One 5mm tubular adenomatous polyp in the transverse colon; One 5mm tubular adenomatous polyp in the cecum; One 5mm tubular adenomatous polyp in the ascending colon; Two 6-7mm tubular adenomatous polyps at the hepatic flexure; Repeat 3 years    ENDOSCOPY N/A 05/12/2017    Non-obstructing Schatzki ring-dilated; Normal stomach; Normal examined duodenum; No specimens collected    ENDOSCOPY  03/10/2016    Small HH; Low  grade of narrowing Schatzki ring-dilated; Normal stomach; Normal examined duodenum; No specimens collected    ENDOSCOPY  03/15/2012    HH; Schatzki ring-dilated to 19mm    ENDOSCOPY  08/05/2005    Normal endoscopy; GERD by history     ENDOSCOPY N/A 04/29/2021    Small HH; Non-obstructing Schatzki ring-Dilated; Normal stomach; Normal examined duodenum; No specimens collected    ENDOSCOPY N/A 07/14/2022    Non-obstructing Schatzki ring-Dilated; Normal stomach; Normal examined duodenum; No specimens collected    EYE SURGERY  having cataract surgery 04/12/23    HAMMER TOE REPAIR Right 11/06/2018    Procedure: HAMMERTOE REPAIR WITH PINNING 2nd DIGIT, CARTIVA IMPLANT;  Surgeon: Pk East DPM;  Location:  [facility] OR;  Service: Podiatry    HYPOGLOSSAL NERVE STIMULATION DEVICE IMPLANT N/A 03/15/2023    Procedure: HYPOGLOSSAL NERVE STIMULATION DEVICE IMPLANT;  Surgeon: Enoch Naik MD;  Location:  [facility] OR;  Service: ENT;  Laterality: N/A;    HYSTERECTOMY      KNEE ARTHROSCOPY W/ MENISCAL REPAIR Left     PATENT FORAMEN OVALE CLOSURE  2014    TOE NAIL AVULSION Right 11/06/2018    Procedure: TOE NAIL AVULSION WITH NAIL BIOPSY- RIGHT FOOT;  Surgeon: Pk East DPM;  Location:  [facility] OR;  Service: Podiatry    TONSILLECTOMY          Family History:  Family History   Problem Relation Age of Onset    Colon polyps Mother         Unknown age     Heart disease Mother     Diabetes Mother     Heart failure Mother         open heart surgery    Hypertension Mother         Pulmonary Hypertension    Stroke Mother         while having heart cath    Breast cancer Mother     Heart disease Father     Heart failure Father         open heart surgery    Stroke Father         Stroke    Rashes / Skin problems Father         skin cancer    Skin cancer Father     Thyroid cancer Sister     Leukemia Sister     Thyroid disease Sister         thyroid cancer    Thyroid cancer Sister     Osteoarthritis Maternal Grandmother          grandmother    Rectal cancer Maternal Grandfather         In late 70's/early 80's     Skin cancer Paternal Grandmother     Osteoarthritis Maternal Aunt         Aunt    Colon cancer Neg Hx     Esophageal cancer Neg Hx     Liver cancer Neg Hx     Liver disease Neg Hx     Stomach cancer Neg Hx        Social History:   reports that she quit smoking about 17 years ago. Her smoking use included cigarettes. She started smoking about 40 years ago. She has a 57.1 pack-year smoking history. She has never used smokeless tobacco. She reports current alcohol use of about 2.0 - 4.0 standard drinks of alcohol per week. She reports that she does not use drugs.    Medications:   Medications Prior to Admission   Medication Sig Dispense Refill Last Dose    Apoaequorin (Prevagen) 10 MG capsule Take 1 capsule by mouth Daily.   6/20/2024    buPROPion XL (WELLBUTRIN XL) 300 MG 24 hr tablet Take 150 mg by mouth Every Morning.   6/20/2024    Calcium Citrate-Vitamin D (Citrus Calcium/Vitamin D) 200-6.25 MG-MCG tablet Take 1 tablet by mouth Daily.   6/20/2024    hydroxychloroquine (PLAQUENIL) 200 MG tablet Take 1 tablet by mouth Daily.   6/20/2024    Multiple Vitamins-Minerals (CENTRUM SILVER 50+WOMEN PO) Take 1 tablet by mouth Daily.   6/20/2024    pantoprazole (PROTONIX) 40 MG EC tablet Take 1 tablet by mouth Daily. (Patient taking differently: Take 1 tablet by mouth 2 (Two) Times a Day.)   6/20/2024    pramipexole (MIRAPEX) 1 MG tablet Take 1 tablet by mouth 2 (two) times a day. Takes 1/2 tablet at a.m dose   6/20/2024    Specialty Vitamins Products (VITAMINS FOR HAIR PO) Take 1 capsule by mouth Daily. Hair growth support   6/20/2024    traZODone (DESYREL) 50 MG tablet Take 1 tablet by mouth As Needed.   6/20/2024    cholecalciferol (Vitamin D, Cholecalciferol,) 25 MCG (1000 UT) tablet Take 1 tablet by mouth Daily.       EPINEPHrine (EPIPEN) 0.3 MG/0.3ML solution auto-injector injection        fluticasone (FLONASE) 50 MCG/ACT nasal spray 2  "sprays into the nostril(s) as directed by provider Daily. (Patient taking differently: 2 sprays into the nostril(s) as directed by provider As Needed.) 1 bottle 6 More than a month    polyethyl glycol-propyl glycol (SYSTANE) 0.4-0.3 % solution ophthalmic solution (artificial tears) Every 1 (One) Hour As Needed.       triamcinolone (KENALOG) 0.025 % cream Apply  topically to the appropriate area as directed 2 (Two) Times a Day. 15 g 0        Allergies:  Patient has no known allergies.    ROS:    Resp: No SOA  Cardiovascular: No CP      Objective     /53   Pulse 75   Temp 96 °F (35.6 °C) (Temporal)   Resp 18   Ht 174 cm (68.5\")   Wt 73.9 kg (163 lb)   SpO2 96%   BMI 24.42 kg/m²     Physical Exam   Constitutional: Patient is oriented to person, place, and in no distress.  Pulmonary/Chest: No distress.  No audible wheezes  Psychiatric: Mood, memory, affect and judgment appear normal.     Assessment & Plan     Diagnosis:  Colon polyps    Anticipated Surgical Procedure:  Colonoscopy    The risks, benefits, and alternatives of colonoscopy were reviewed with the patient today.  Risks including perforation of the colon possibly requiring surgery or colostomy.  Additional risks include risk of bleeding from biopsies or removal of colon tissue.  There is also the risk of a drug reaction or problems with anesthesia.  This will be discussed with the patient further by the anesthesia team on the day of the procedure.  Lastly there is a possibility of missing a colon polyp or cancer.  The benefits include the diagnosis and management of disease of the colon and rectum.  Alternatives to colonoscopy include barium enema, laboratory testing, radiographic evaluation, or no intervention.  The patient verbalizes understanding and agrees.        Please note that portions of this note were completed with a voice recognition program.         "

## 2024-06-21 NOTE — ANESTHESIA PREPROCEDURE EVALUATION
Anesthesia Evaluation     Patient summary reviewed and Nursing notes reviewed   no history of anesthetic complications:   NPO Solid Status: > 8 hours             Airway   Mallampati: I  TM distance: >3 FB  Neck ROM: full  No difficulty expected  Dental          Pulmonary    (+) a smoker Former, COPD,sleep apnea  Cardiovascular   Exercise tolerance: good (4-7 METS)    (+) valvular problems/murmurs (PFO s/p closure), hyperlipidemia      Neuro/Psych  (-) seizures, TIA, CVA  GI/Hepatic/Renal/Endo    (+) GERD  (-) liver disease, no renal disease, diabetes    Musculoskeletal     Abdominal    Substance History      OB/GYN          Other                          Anesthesia Plan    ASA 2     MAC     intravenous induction     Anesthetic plan, risks, benefits, and alternatives have been provided, discussed and informed consent has been obtained with: patient.        CODE STATUS:

## 2024-06-24 LAB
LAB AP CASE REPORT: NORMAL
Lab: NORMAL
PATH REPORT.FINAL DX SPEC: NORMAL
PATH REPORT.GROSS SPEC: NORMAL

## 2024-09-08 ENCOUNTER — HOSPITAL ENCOUNTER (EMERGENCY)
Age: 74
Discharge: HOME | End: 2024-09-08
Attending: EMERGENCY MEDICINE
Payer: MEDICARE

## 2024-09-08 ENCOUNTER — APPOINTMENT (OUTPATIENT)
Dept: CT IMAGING | Age: 74
End: 2024-09-08
Payer: MEDICARE

## 2024-09-08 ENCOUNTER — APPOINTMENT (OUTPATIENT)
Dept: GENERAL RADIOLOGY | Age: 74
End: 2024-09-08
Payer: MEDICARE

## 2024-09-08 VITALS
WEIGHT: 159 LBS | OXYGEN SATURATION: 94 % | HEIGHT: 68 IN | BODY MASS INDEX: 24.1 KG/M2 | DIASTOLIC BLOOD PRESSURE: 63 MMHG | SYSTOLIC BLOOD PRESSURE: 120 MMHG | TEMPERATURE: 98.1 F | RESPIRATION RATE: 26 BRPM | HEART RATE: 100 BPM

## 2024-09-08 DIAGNOSIS — J18.9 PNEUMONIA OF RIGHT LOWER LOBE DUE TO INFECTIOUS ORGANISM: Primary | ICD-10-CM

## 2024-09-08 LAB
ALBUMIN SERPL-MCNC: 3.6 G/DL (ref 3.5–5.2)
ALP SERPL-CCNC: 160 U/L (ref 35–104)
ALT SERPL-CCNC: 35 U/L (ref 5–33)
ANION GAP SERPL CALCULATED.3IONS-SCNC: 11 MMOL/L (ref 7–19)
AST SERPL-CCNC: 26 U/L (ref 5–32)
B PARAP IS1001 DNA NPH QL NAA+NON-PROBE: NOT DETECTED
B PERT.PT PRMT NPH QL NAA+NON-PROBE: NOT DETECTED
BACTERIA URNS QL MICRO: NEGATIVE /HPF
BASOPHILS # BLD: 0.1 K/UL (ref 0–0.2)
BASOPHILS NFR BLD: 0.5 % (ref 0–1)
BILIRUB SERPL-MCNC: 0.6 MG/DL (ref 0.2–1.2)
BILIRUB UR QL STRIP: NEGATIVE
BUN SERPL-MCNC: 15 MG/DL (ref 8–23)
C PNEUM DNA NPH QL NAA+NON-PROBE: NOT DETECTED
CALCIUM SERPL-MCNC: 9 MG/DL (ref 8.8–10.2)
CHLORIDE SERPL-SCNC: 103 MMOL/L (ref 98–111)
CLARITY UR: CLEAR
CO2 SERPL-SCNC: 24 MMOL/L (ref 22–29)
COLOR UR: YELLOW
CREAT SERPL-MCNC: 0.8 MG/DL (ref 0.5–0.9)
CRYSTALS URNS MICRO: NORMAL /HPF
EOSINOPHIL # BLD: 0.3 K/UL (ref 0–0.6)
EOSINOPHIL NFR BLD: 2.2 % (ref 0–5)
EPI CELLS #/AREA URNS AUTO: 1 /HPF (ref 0–5)
ERYTHROCYTE [DISTWIDTH] IN BLOOD BY AUTOMATED COUNT: 12.5 % (ref 11.5–14.5)
FLUAV RNA NPH QL NAA+NON-PROBE: NOT DETECTED
FLUBV RNA NPH QL NAA+NON-PROBE: NOT DETECTED
GLUCOSE SERPL-MCNC: 104 MG/DL (ref 70–99)
GLUCOSE UR STRIP.AUTO-MCNC: NEGATIVE MG/DL
HADV DNA NPH QL NAA+NON-PROBE: NOT DETECTED
HCOV 229E RNA NPH QL NAA+NON-PROBE: NOT DETECTED
HCOV HKU1 RNA NPH QL NAA+NON-PROBE: NOT DETECTED
HCOV NL63 RNA NPH QL NAA+NON-PROBE: NOT DETECTED
HCOV OC43 RNA NPH QL NAA+NON-PROBE: NOT DETECTED
HCT VFR BLD AUTO: 38.9 % (ref 37–47)
HGB BLD-MCNC: 13.1 G/DL (ref 12–16)
HGB UR STRIP.AUTO-MCNC: NEGATIVE MG/L
HMPV RNA NPH QL NAA+NON-PROBE: NOT DETECTED
HPIV1 RNA NPH QL NAA+NON-PROBE: NOT DETECTED
HPIV2 RNA NPH QL NAA+NON-PROBE: NOT DETECTED
HPIV3 RNA NPH QL NAA+NON-PROBE: NOT DETECTED
HPIV4 RNA NPH QL NAA+NON-PROBE: NOT DETECTED
HYALINE CASTS #/AREA URNS AUTO: 0 /HPF (ref 0–8)
IMM GRANULOCYTES # BLD: 0.1 K/UL
KETONES UR STRIP.AUTO-MCNC: NEGATIVE MG/DL
LEUKOCYTE ESTERASE UR QL STRIP.AUTO: ABNORMAL
LYMPHOCYTES # BLD: 0.8 K/UL (ref 1.1–4.5)
LYMPHOCYTES NFR BLD: 6.5 % (ref 20–40)
M PNEUMO DNA NPH QL NAA+NON-PROBE: NOT DETECTED
MCH RBC QN AUTO: 32.1 PG (ref 27–31)
MCHC RBC AUTO-ENTMCNC: 33.7 G/DL (ref 33–37)
MCV RBC AUTO: 95.3 FL (ref 81–99)
MONOCYTES # BLD: 0.8 K/UL (ref 0–0.9)
MONOCYTES NFR BLD: 7.1 % (ref 0–10)
NEUTROPHILS # BLD: 9.8 K/UL (ref 1.5–7.5)
NEUTS SEG NFR BLD: 83.2 % (ref 50–65)
NITRITE UR QL STRIP.AUTO: NEGATIVE
PH UR STRIP.AUTO: 6.5 [PH] (ref 5–8)
PLATELET # BLD AUTO: 301 K/UL (ref 130–400)
PMV BLD AUTO: 9.9 FL (ref 9.4–12.3)
POTASSIUM SERPL-SCNC: 3.9 MMOL/L (ref 3.5–5)
PROT SERPL-MCNC: 6.8 G/DL (ref 6.4–8.3)
PROT UR STRIP.AUTO-MCNC: NEGATIVE MG/DL
RBC # BLD AUTO: 4.08 M/UL (ref 4.2–5.4)
RBC #/AREA URNS AUTO: 1 /HPF (ref 0–4)
REASON FOR REJECTION: NORMAL
REJECTED TEST: NORMAL
RSV RNA NPH QL NAA+NON-PROBE: NOT DETECTED
RV+EV RNA NPH QL NAA+NON-PROBE: NOT DETECTED
SARS-COV-2 RNA NPH QL NAA+NON-PROBE: NOT DETECTED
SODIUM SERPL-SCNC: 138 MMOL/L (ref 136–145)
SP GR UR STRIP.AUTO: 1.01 (ref 1–1.03)
UROBILINOGEN UR STRIP.AUTO-MCNC: 1 E.U./DL
WBC # BLD AUTO: 11.8 K/UL (ref 4.8–10.8)
WBC #/AREA URNS AUTO: 1 /HPF (ref 0–5)

## 2024-09-08 PROCEDURE — 0202U NFCT DS 22 TRGT SARS-COV-2: CPT

## 2024-09-08 PROCEDURE — 6360000004 HC RX CONTRAST MEDICATION: Performed by: EMERGENCY MEDICINE

## 2024-09-08 PROCEDURE — 99285 EMERGENCY DEPT VISIT HI MDM: CPT

## 2024-09-08 PROCEDURE — 71260 CT THORAX DX C+: CPT

## 2024-09-08 PROCEDURE — 93005 ELECTROCARDIOGRAM TRACING: CPT | Performed by: EMERGENCY MEDICINE

## 2024-09-08 PROCEDURE — 71045 X-RAY EXAM CHEST 1 VIEW: CPT

## 2024-09-08 PROCEDURE — 36415 COLL VENOUS BLD VENIPUNCTURE: CPT

## 2024-09-08 PROCEDURE — 81001 URINALYSIS AUTO W/SCOPE: CPT

## 2024-09-08 PROCEDURE — 85025 COMPLETE CBC W/AUTO DIFF WBC: CPT

## 2024-09-08 PROCEDURE — 80053 COMPREHEN METABOLIC PANEL: CPT

## 2024-09-08 RX ORDER — IOPAMIDOL 755 MG/ML
75 INJECTION, SOLUTION INTRAVASCULAR
Status: COMPLETED | OUTPATIENT
Start: 2024-09-08 | End: 2024-09-08

## 2024-09-08 RX ORDER — HYDROXYCHLOROQUINE SULFATE 200 MG/1
1.5 TABLET, FILM COATED ORAL DAILY
COMMUNITY

## 2024-09-08 RX ORDER — CEFDINIR 300 MG/1
300 CAPSULE ORAL 2 TIMES DAILY
Qty: 20 CAPSULE | Refills: 0 | Status: SHIPPED | OUTPATIENT
Start: 2024-09-08 | End: 2024-09-18

## 2024-09-08 RX ADMIN — IOPAMIDOL 75 ML: 755 INJECTION, SOLUTION INTRAVENOUS at 14:03

## 2024-09-09 LAB
EKG P AXIS: 63 DEGREES
EKG P-R INTERVAL: 168 MS
EKG Q-T INTERVAL: 332 MS
EKG QRS DURATION: 78 MS
EKG QTC CALCULATION (BAZETT): 405 MS
EKG T AXIS: 56 DEGREES

## 2024-09-09 PROCEDURE — 93010 ELECTROCARDIOGRAM REPORT: CPT | Performed by: INTERNAL MEDICINE

## 2024-09-09 RX ORDER — PANTOPRAZOLE SODIUM 40 MG/1
40 TABLET, DELAYED RELEASE ORAL
Qty: 60 TABLET | Refills: 11 | Status: SHIPPED | OUTPATIENT
Start: 2024-09-09

## 2024-09-19 ASSESSMENT — ENCOUNTER SYMPTOMS
VOMITING: 0
SHORTNESS OF BREATH: 1
NAUSEA: 0
WHEEZING: 0
ABDOMINAL DISTENTION: 0
ABDOMINAL PAIN: 0
COUGH: 1
DIARRHEA: 0

## 2024-09-20 ENCOUNTER — HOSPITAL ENCOUNTER (OUTPATIENT)
Dept: GENERAL RADIOLOGY | Age: 74
Discharge: HOME | End: 2024-09-20
Payer: MEDICARE

## 2024-09-20 DIAGNOSIS — J18.9 PNEUMONIA DUE TO INFECTIOUS ORGANISM, UNSPECIFIED LATERALITY, UNSPECIFIED PART OF LUNG: ICD-10-CM

## 2024-09-20 PROCEDURE — 71046 X-RAY EXAM CHEST 2 VIEWS: CPT

## 2024-12-23 ENCOUNTER — HOSPITAL ENCOUNTER (OUTPATIENT)
Dept: CT IMAGING | Age: 74
Discharge: HOME | End: 2024-12-23
Payer: MEDICARE

## 2024-12-23 ENCOUNTER — OFFICE VISIT (OUTPATIENT)
Dept: PULMONOLOGY | Age: 74
End: 2024-12-23
Payer: MEDICARE

## 2024-12-23 VITALS
HEART RATE: 74 BPM | BODY MASS INDEX: 25.55 KG/M2 | TEMPERATURE: 98.6 F | RESPIRATION RATE: 20 BRPM | OXYGEN SATURATION: 96 % | DIASTOLIC BLOOD PRESSURE: 66 MMHG | WEIGHT: 168.6 LBS | HEIGHT: 68 IN | SYSTOLIC BLOOD PRESSURE: 120 MMHG

## 2024-12-23 DIAGNOSIS — R09.82 POST-NASAL DRAINAGE: ICD-10-CM

## 2024-12-23 DIAGNOSIS — J18.1 LUNG CONSOLIDATION (HCC): ICD-10-CM

## 2024-12-23 DIAGNOSIS — R94.2 DIFFUSION CAPACITY OF LUNG (DL), DECREASED: Primary | ICD-10-CM

## 2024-12-23 DIAGNOSIS — Z77.22 SECOND HAND SMOKE EXPOSURE: ICD-10-CM

## 2024-12-23 PROCEDURE — G8399 PT W/DXA RESULTS DOCUMENT: HCPCS | Performed by: INTERNAL MEDICINE

## 2024-12-23 PROCEDURE — G8427 DOCREV CUR MEDS BY ELIG CLIN: HCPCS | Performed by: INTERNAL MEDICINE

## 2024-12-23 PROCEDURE — G8419 CALC BMI OUT NRM PARAM NOF/U: HCPCS | Performed by: INTERNAL MEDICINE

## 2024-12-23 PROCEDURE — 1090F PRES/ABSN URINE INCON ASSESS: CPT | Performed by: INTERNAL MEDICINE

## 2024-12-23 PROCEDURE — 3017F COLORECTAL CA SCREEN DOC REV: CPT | Performed by: INTERNAL MEDICINE

## 2024-12-23 PROCEDURE — G8484 FLU IMMUNIZE NO ADMIN: HCPCS | Performed by: INTERNAL MEDICINE

## 2024-12-23 PROCEDURE — 1036F TOBACCO NON-USER: CPT | Performed by: INTERNAL MEDICINE

## 2024-12-23 PROCEDURE — 99214 OFFICE O/P EST MOD 30 MIN: CPT | Performed by: INTERNAL MEDICINE

## 2024-12-23 PROCEDURE — 1123F ACP DISCUSS/DSCN MKR DOCD: CPT | Performed by: INTERNAL MEDICINE

## 2024-12-23 PROCEDURE — 1159F MED LIST DOCD IN RCRD: CPT | Performed by: INTERNAL MEDICINE

## 2024-12-23 PROCEDURE — 71250 CT THORAX DX C-: CPT

## 2024-12-23 RX ORDER — CLOBETASOL PROPIONATE 0.5 MG/G
CREAM TOPICAL
COMMUNITY

## 2024-12-23 RX ORDER — HYDROCODONE BITARTRATE AND ACETAMINOPHEN 5; 325 MG/1; MG/1
1 TABLET ORAL 3 TIMES DAILY PRN
COMMUNITY

## 2024-12-23 ASSESSMENT — ENCOUNTER SYMPTOMS
RHINORRHEA: 0
ABDOMINAL DISTENTION: 0
CHEST TIGHTNESS: 0
ANAL BLEEDING: 0
SHORTNESS OF BREATH: 0
BACK PAIN: 0
COUGH: 0
APNEA: 1
ABDOMINAL PAIN: 0
WHEEZING: 0

## 2024-12-23 NOTE — PROGRESS NOTES
Pulmonary and Sleep Medicine    Anna Sanderson (:  1950) is a 74 y.o. female,Established patient, here for evaluation of the following chief complaint(s):  Follow-up (1 year follow up for [facility]/Inspire report is in the process of being uploaded. //Pt states that her only complaint is that her upper right side of her chest will hurt occasionally where the inspire device stems from. )      Referring physician:  No referring provider defined for this encounter.     ASSESSMENT/PLAN:  1. Diffusion capacity of lung (dl), decreased  -     Full PFT Study With Bronchodilator; Future  2. Lung consolidation (HCC)  -     CT CHEST WO CONTRAST; Future  3. Post-nasal drainage  4. Second hand smoke exposure        Will repeat pulmonary function study to assess her diffusing capacity.  We will also repeat CT of the chest to assure resolution of the masslike density in the right lower lobe.       Cara Allred MD, [facility], [facility]    Return in about 2 weeks (around 2025).    SUBJECTIVE/OBJECTIVE:        Patient is here for follow-up on diffusing lung capacity and chronic cough.  She endorses postnasal drainage.  She is on Sudafed and allergy pill by her report.  She also had a CT of the chest in September due to pleuritic chest pain.  The CT showed according to my interpretation masslike density in the right lower lobe and pleural thickening.  The patient had subsequent chest x-ray that was reported as clear.  Patient feels at baseline.  She complains of occasional pain at the inspire site.          Continue the following medications as reported by the patient:    Prior to Visit Medications    Medication Sig Taking? Authorizing Provider   HYDROcodone-acetaminophen (NORCO) 5-325 MG per tablet Take 1 tablet by mouth 3 times daily as needed. Yes Grant uNñez MD   tiZANidine (ZANAFLEX) 4 MG tablet Take 1 tablet by mouth nightly Yes Grant Nuñez MD   clobetasol (TEMOVATE) 0.05 % cream  Yes Provider

## 2025-01-07 ENCOUNTER — OFFICE VISIT (OUTPATIENT)
Dept: PULMONOLOGY | Age: 75
End: 2025-01-07
Payer: MEDICARE

## 2025-01-07 VITALS
DIASTOLIC BLOOD PRESSURE: 55 MMHG | OXYGEN SATURATION: 92 % | BODY MASS INDEX: 25.46 KG/M2 | WEIGHT: 168 LBS | SYSTOLIC BLOOD PRESSURE: 141 MMHG | TEMPERATURE: 96.8 F | HEIGHT: 68 IN | RESPIRATION RATE: 20 BRPM

## 2025-01-07 DIAGNOSIS — G47.33 OBSTRUCTIVE SLEEP APNEA: ICD-10-CM

## 2025-01-07 DIAGNOSIS — R05.3 CHRONIC COUGH: ICD-10-CM

## 2025-01-07 DIAGNOSIS — J18.1 LUNG CONSOLIDATION (HCC): Primary | ICD-10-CM

## 2025-01-07 DIAGNOSIS — R94.2 DIFFUSION CAPACITY OF LUNG (DL), DECREASED: ICD-10-CM

## 2025-01-07 DIAGNOSIS — R09.82 POST-NASAL DRAINAGE: ICD-10-CM

## 2025-01-07 PROCEDURE — 1159F MED LIST DOCD IN RCRD: CPT | Performed by: INTERNAL MEDICINE

## 2025-01-07 PROCEDURE — M1308 PR FLU IMMUNIZE NO ADMIN: HCPCS | Performed by: INTERNAL MEDICINE

## 2025-01-07 PROCEDURE — 1123F ACP DISCUSS/DSCN MKR DOCD: CPT | Performed by: INTERNAL MEDICINE

## 2025-01-07 PROCEDURE — 1036F TOBACCO NON-USER: CPT | Performed by: INTERNAL MEDICINE

## 2025-01-07 PROCEDURE — G8399 PT W/DXA RESULTS DOCUMENT: HCPCS | Performed by: INTERNAL MEDICINE

## 2025-01-07 PROCEDURE — G8419 CALC BMI OUT NRM PARAM NOF/U: HCPCS | Performed by: INTERNAL MEDICINE

## 2025-01-07 PROCEDURE — 1090F PRES/ABSN URINE INCON ASSESS: CPT | Performed by: INTERNAL MEDICINE

## 2025-01-07 PROCEDURE — 3017F COLORECTAL CA SCREEN DOC REV: CPT | Performed by: INTERNAL MEDICINE

## 2025-01-07 PROCEDURE — 99214 OFFICE O/P EST MOD 30 MIN: CPT | Performed by: INTERNAL MEDICINE

## 2025-01-07 PROCEDURE — G8427 DOCREV CUR MEDS BY ELIG CLIN: HCPCS | Performed by: INTERNAL MEDICINE

## 2025-01-07 ASSESSMENT — ENCOUNTER SYMPTOMS
ANAL BLEEDING: 0
APNEA: 0
COUGH: 1
ABDOMINAL DISTENTION: 0
SHORTNESS OF BREATH: 0
WHEEZING: 0
CHEST TIGHTNESS: 0
BACK PAIN: 0
ABDOMINAL PAIN: 0
RHINORRHEA: 0

## 2025-01-07 NOTE — PROGRESS NOTES
WITH MINERALS ORAL) solution Take 15 mLs by mouth daily Yes Grant Nuñez MD   traZODone (DESYREL) 50 MG tablet TAKE 1 TABLET BY MOUTH AT BEDTIME AS NEEDED Yes Grant Nuñez MD   Vitamin D, Cholecalciferol, 25 MCG (1000 UT) CAPS  Yes Grant Nuñez MD   albuterol sulfate HFA (PROVENTIL;VENTOLIN;PROAIR) 108 (90 Base) MCG/ACT inhaler  Yes Grant Nuñez MD   buPROPion (WELLBUTRIN XL) 300 MG extended release tablet  Yes Grant Nuñez MD   HYDROcodone-acetaminophen (NORCO) 5-325 MG per tablet Take 1 tablet by mouth 3 times daily as needed.  Patient not taking: Reported on 1/7/2025  Grant Nuñez MD   tiZANidine (ZANAFLEX) 4 MG tablet Take 1 tablet by mouth nightly  Patient not taking: Reported on 1/7/2025  Grant Nuñez MD   Dexbrompheniramine-Pseudoeph 2-60 MG TABS Take by mouth  Patient not taking: Reported on 1/7/2025  Grant Nuñez MD        Review of Systems   Constitutional:  Negative for activity change, appetite change, chills, diaphoresis and fatigue.   HENT:  Negative for congestion, dental problem, drooling, ear discharge, postnasal drip and rhinorrhea.    Eyes:  Negative for visual disturbance.   Respiratory:  Positive for cough. Negative for apnea, chest tightness, shortness of breath and wheezing.    Gastrointestinal:  Negative for abdominal distention, abdominal pain and anal bleeding.   Endocrine: Negative for cold intolerance, heat intolerance and polydipsia.   Genitourinary:  Negative for difficulty urinating, dysuria, enuresis and flank pain.   Musculoskeletal:  Negative for arthralgias, back pain and gait problem.   Allergic/Immunologic: Negative for environmental allergies.   Neurological:  Negative for dizziness, facial asymmetry, light-headedness and headaches.       Vitals:    01/07/25 1437   BP: (!) 141/55   Resp: 20   Temp: 96.8 °F (36 °C)   SpO2: 92%   Weight: 76.2 kg (168 lb)   Height: 1.727 m (5' 8\")      BMI Readings from Last 1

## 2025-01-29 ENCOUNTER — TELEPHONE (OUTPATIENT)
Dept: NEUROLOGY | Age: 75
End: 2025-01-29

## 2025-01-29 NOTE — TELEPHONE ENCOUNTER
I have called and left patient a VM to let her know that her appointment for 06-16-25 with JESUS Dahl had to be rescheduled due to the provider is out of the office. Left on VM of when I have patient appointment rescheduled too.

## 2025-01-30 ENCOUNTER — HOSPITAL ENCOUNTER (OUTPATIENT)
Dept: WOMENS IMAGING | Age: 75
Discharge: HOME | End: 2025-01-30
Payer: MEDICARE

## 2025-01-30 VITALS — BODY MASS INDEX: 25.09 KG/M2 | WEIGHT: 165 LBS

## 2025-01-30 DIAGNOSIS — Z78.0 ASYMPTOMATIC MENOPAUSAL STATE: ICD-10-CM

## 2025-01-30 DIAGNOSIS — Z12.31 VISIT FOR SCREENING MAMMOGRAM: ICD-10-CM

## 2025-01-30 PROCEDURE — 77063 BREAST TOMOSYNTHESIS BI: CPT

## 2025-01-30 PROCEDURE — 77080 DXA BONE DENSITY AXIAL: CPT

## 2025-02-04 ENCOUNTER — HOSPITAL ENCOUNTER (OUTPATIENT)
Dept: PULMONOLOGY | Age: 75
Discharge: HOME | End: 2025-02-04
Attending: INTERNAL MEDICINE
Payer: MEDICARE

## 2025-02-04 DIAGNOSIS — R94.2 DIFFUSION CAPACITY OF LUNG (DL), DECREASED: ICD-10-CM

## 2025-02-04 PROCEDURE — 94726 PLETHYSMOGRAPHY LUNG VOLUMES: CPT

## 2025-02-04 PROCEDURE — 6360000002 HC RX W HCPCS: Performed by: INTERNAL MEDICINE

## 2025-02-04 PROCEDURE — 94729 DIFFUSING CAPACITY: CPT

## 2025-02-04 PROCEDURE — 94060 EVALUATION OF WHEEZING: CPT

## 2025-02-04 RX ORDER — ALBUTEROL SULFATE 0.83 MG/ML
2.5 SOLUTION RESPIRATORY (INHALATION) 2 TIMES DAILY PRN
Status: DISCONTINUED | OUTPATIENT
Start: 2025-02-04 | End: 2025-02-06 | Stop reason: HOSPADM

## 2025-02-04 RX ADMIN — ALBUTEROL SULFATE 2.5 MG: 2.5 SOLUTION RESPIRATORY (INHALATION) at 11:03

## 2025-02-04 NOTE — PROCEDURES
Media Information          Pulmonary Function Study    Interpretation:    The FVC is Normal. FEV1 is Normal. FEV1/FVC ratio is Normal. After bronchodilator therapy there was no significant change in FEV1. Total lung capacity is Normal. Residual volume is Normal.      Diffusing lung capacity when corrected for alveolar volume is moderately reduced.         Impression:    Reduced diffusing lung  capacity in the absence of significant spirometric or lung volume abnormality. Differential diagnosis includes occult interstitial lung disease vs. pulmonary vascular disease.         Cara Allred MD, FCCP, [facility]

## 2025-03-03 ENCOUNTER — OFFICE VISIT (OUTPATIENT)
Age: 75
End: 2025-03-03
Payer: MEDICARE

## 2025-03-03 DIAGNOSIS — M20.21 HALLUX RIGIDUS OF RIGHT FOOT: ICD-10-CM

## 2025-03-03 DIAGNOSIS — M79.671 RIGHT FOOT PAIN: ICD-10-CM

## 2025-03-03 DIAGNOSIS — M19.071 PRIMARY OSTEOARTHRITIS OF RIGHT FOOT: Primary | ICD-10-CM

## 2025-03-03 PROCEDURE — 1159F MED LIST DOCD IN RCRD: CPT | Performed by: PODIATRIST

## 2025-03-03 PROCEDURE — 1090F PRES/ABSN URINE INCON ASSESS: CPT | Performed by: PODIATRIST

## 2025-03-03 PROCEDURE — G8399 PT W/DXA RESULTS DOCUMENT: HCPCS | Performed by: PODIATRIST

## 2025-03-03 PROCEDURE — G8427 DOCREV CUR MEDS BY ELIG CLIN: HCPCS | Performed by: PODIATRIST

## 2025-03-03 PROCEDURE — 1123F ACP DISCUSS/DSCN MKR DOCD: CPT | Performed by: PODIATRIST

## 2025-03-03 PROCEDURE — 3017F COLORECTAL CA SCREEN DOC REV: CPT | Performed by: PODIATRIST

## 2025-03-03 PROCEDURE — 99214 OFFICE O/P EST MOD 30 MIN: CPT | Performed by: PODIATRIST

## 2025-03-03 PROCEDURE — 1036F TOBACCO NON-USER: CPT | Performed by: PODIATRIST

## 2025-03-03 PROCEDURE — G8419 CALC BMI OUT NRM PARAM NOF/U: HCPCS | Performed by: PODIATRIST

## 2025-03-03 RX ORDER — PRAMIPEXOLE DIHYDROCHLORIDE 1 MG/1
1 TABLET ORAL 3 TIMES DAILY
COMMUNITY
Start: 2025-01-24

## 2025-03-03 RX ORDER — MECLIZINE HYDROCHLORIDE 25 MG/1
25 TABLET ORAL 3 TIMES DAILY PRN
COMMUNITY

## 2025-03-03 NOTE — H&P (VIEW-ONLY)
[facility]  [facility]  [address]   [facility] [id number] [phone]     Patient: Earline Salazar   YOB: 1950   Date: 3/3/2025   Visit Type:  Follow up    Body Part:  right foot    When did the symptoms begin/Date of Onset or date of surgery? Date of Surgery: 11/06/2018  Cheilectomy with Implant, Toe Nail Avulsion, Hammer Toe Repair    Pt states that she has had pain within the last 6 months. Pt states that the pain is mainly the bottom ball of her foot. Pt states when she walks a lot is when the pain is discomfort.     If over 55, have you bradley an Osteoporosis Screening in the last 2 years? Yes- [facility]    History of Present Illness  Chief Complaint   Patient presents with   • Follow Up Right Foot       This is a 74 y.o. female  presents today complaining of pain in her right great toe joint.  She does have a history of a surgery with cheilectomy with Cartiva implant back in November 2018.  She relates worsening pain especially over the last 6 months.  She has tried NSAIDs, shoe changes, inserts without success.    Review of Systems  System  Neg/Pos  Details  Constitutional  Negative  Chills, Fatigue, Fever and Night Sweats  Respiratory  Negative  Chest Pain, Cough and Dyspnea  Cardio   Negative  Leg Swelling  GI   Negative  Abdominal Pain, Constipation, Nausea and Vomiting     Negative  Urinary Incontinence   Endocrine  Negative  Weight Gain and Weight Loss  MS   Negative  Except as noted in HPI and Chief Complaint    Past Medical History:   Diagnosis Date   • Arthritis     arthritus in fingers   • Bursitis ?    had a few times   • Cancer ([facility]) 2023    precancerous & cancerous on legs   • COPD (chronic obstructive pulmonary disease) ([facility])    • COVID-19 2024    in Feb. and Oct.   • Depression    • Fractures 2020 &2022    foot and wrist   • GERD (gastroesophageal reflux disease) 2002    GERD   • Osteoporosis      Dexa showed -2.8   • Pneumonia     pneumonia   • Restless legs syndrome    • Sleep apnea     Inspire Implant      Past Surgical History:   Procedure Laterality Date   • ABDOMEN SURGERY      hysterectomy   • APPENDECTOMY     • ARM SURGERY     • BREAST BIOPSY Right     b9 excision about age32   • COLONOSCOPY  2021    Dr Reyes TA x 5,   • ESOPHAGOGASTRODUODENOSCOPY  2017    Dr Reyes   • ESOPHAGOGASTRODUODENOSCOPY  2021    Dr Reyes W Dilation   • HYSTERECTOMY (CERVIX STATUS UNKNOWN)      age 40   • KNEE ARTHROSCOPY      meniscectomy   • KNEE SURGERY     • OVARY REMOVAL Bilateral     age 40   • TOE SURGERY     • TONSILLECTOMY        Social History     Socioeconomic History   • Marital status:      Spouse name: None   • Number of children: None   • Years of education: None   • Highest education level: None   Tobacco Use   • Smoking status: Former     Current packs/day: 0.00     Average packs/day: 2.0 packs/day for 39.0 years (78.0 ttl pk-yrs)     Types: Cigarettes     Start date: 1967     Quit date: 2007     Years since quittin.1   • Smokeless tobacco: Never   • Tobacco comments:     smoked for 40 years - quite in    Vaping Use   • Vaping status: Never Used   Substance and Sexual Activity   • Alcohol use: Not Currently     Comment: occ   • Drug use: Never   • Sexual activity: Not Currently     Partners: Male     Comment: windowed in      Social Determinants of Health      Received from [facility]    Family and Community Support   Intimate Partner Violence: Not At Risk (2024)    Received from [facility]    Abuse Screen    • Feels Unsafe at Home or Work/School: no    • Feels Threatened by Someone: no    • Does Anyone Try to Keep You From Having Contact with Others or Doing Things Outside Your Home?: no    • Physical Signs of Abuse Present: no    Received from [facility], [facility]    Housing Stability      Social History     Occupational History   • Not on file   Tobacco Use   • Smoking status: Former     Current packs/day: 0.00     Average packs/day: 2.0 packs/day for 39.0 years (78.0 ttl pk-yrs)     Types: Cigarettes     Start date: 1967     Quit date: 2007     Years since quittin.1   • Smokeless tobacco: Never   • Tobacco comments:     smoked for 40 years - quite in    Vaping Use   • Vaping status: Never Used   Substance and Sexual Activity   • Alcohol use: Not Currently     Comment: occ   • Drug use: Never   • Sexual activity: Not Currently     Partners: Male     Comment: windowed in         Tobacco Use      Smoking status: Former        Packs/day: 0.00        Years: 2.0 packs/day for 39.0 years (78.0 ttl pk-yrs)        Types: Cigarettes        Start date: 1967        Quit date: 2007        Years since quittin.1      Smokeless tobacco: Never      Tobacco comments: smoked for 40 years - quite in      Family History   Problem Relation Age of Onset   • Breast Cancer Mother 78   • Cancer Mother         breast cancer   • Diabetes Mother    • Broken Bones Father         broke hip twice   • Cancer Father         melanoma   • Cancer Maternal Grandfather         Redtal cancer   • Osteoporosis Maternal Grandmother    • Cancer Paternal Grandmother         Unknown   • Cancer Sister         thyroid cancer        Medications  Current Outpatient Medications   Medication Sig Dispense Refill   • pramipexole (MIRAPEX) 1 MG tablet Take 1 tablet by mouth 3 times daily     • meclizine (ANTIVERT) 25 MG tablet Take 1 tablet by mouth 3 times daily as needed     • clobetasol (TEMOVATE) 0.05 % cream      • pantoprazole (PROTONIX) 40 MG tablet TAKE 1 TABLET BY MOUTH TWICE DAILY BEFORE MEALS 60 tablet 11   • hydroxychloroquine (PLAQUENIL) 200 MG tablet Take 1.5 tablets by mouth daily     • Multiple Vitamins-Minerals (CENTRUM/CERTA-KARRIE WITH MINERALS ORAL)  solution Take 15 mLs by mouth daily     • traZODone (DESYREL) 50 MG tablet TAKE 1 TABLET BY MOUTH AT BEDTIME AS NEEDED     • Vitamin D, Cholecalciferol, 25 MCG (1000 UT) CAPS      • buPROPion (WELLBUTRIN XL) 300 MG extended release tablet        No current facility-administered medications for this visit.        Allergies  No Known Allergies     There were no vitals taken for this visit.     Physical Exam   Physical Examination:  General: The patient is a Well Nourished 74 y.o. female who is alert and oriented x3 in no distress. The patient is cooperative during the exam.  Psychological: Is a pleasant female, good mood and affect, answers questions appropriately.  Head and Neck: Normocephalic, Atraumatic. Neck supple, no evidence of masses.   Abdomen: Soft, nontender, nondistended.  Eyes: Perrla. Extraocular movements intact.   Respiratory: Rate and effort within normal limits.   Vascular: Palpable dorsalis pedis and posterior tibial pulse bilaterally.  Neurological: Adequate sharp and dull sensation bilateral lower extremities.  Dermatological: Skin is cool dry and supple with no open lesions bilateral lower extremities.  Musculoskeletal: She has significant pain with palpation attempted range of motion the first metatarsal phalangeal joint right.  She has prominent bone and significant pain with palpation laterally at the joint.  No subsecond metatarsal head pain.  Gait Examination: Antalgic      Imaging  Xray Result (most recent):  XR FOOT RIGHT (MIN 3 VIEWS) 03/03/2025 (Needs Review)  This result has not been signed. Information might be incomplete.    Narrative  Foot Xray    Foot Xray 3 views. right  taken in office today. Include AP, lateral, lateral oblique, reveal Decreased. Bone Density.  She has a lucency in the head of the first metatarsal consistent with a retained Cartiva implant.  She has subchondral cyst formation in the base of the proximal phalanx as well.  There is significant joint space narrowing  and prominent bone spurring at the dorsal lateral aspect of the first metatarsal phalangeal joint.  No acute abnormality.. Image quality is excellent. Interpreted by Pk East II, DPM    Primary osteoarthritis first metatarsal phalangeal joint right with retained Cartiva implant in the first metatarsal head          Plan    ICD-10-CM    1. Primary osteoarthritis of right foot  M19.071       2. Right foot pain  M79.671 XR FOOT RIGHT (MIN 3 VIEWS)      3. Hallux rigidus of right foot  M20.21            Plan Narrative  I discussed her condition with her today.  I reviewed her x-rays with her today.  We did discuss removal of her Cartiva implant and converting her foot to a first metatarsal phalangeal joint arthrodesis with bone graft harvest from her calcaneus.  Risks and benefits of this procedure were discussed.  Questions were answered.  Postop course complications were reviewed.  She does understand this is a major surgery, performed under general anesthesia with an ankle block.  I would allow her to weight-bear immediately in a offloading shoe.  This was placed in her after visit summary and on MyChart.  Risks and complications including infection, wound dehiscence, nonunion, deep vein thrombosis, continued pain, need for further surgery were discussed and reviewed in detail today.      Return Postop's.      Patient given educational materials - see patient instructions.   Discussed use, benefit, and side effects of prescribed medications.  All patient questions answered.  Pt voiced understanding. Patient agreed with treatment plan. Follow up as needed.    This dictation was generated by voice recognition computer software. Although all attempts are made to edit the dictation for accuracy, there may be errors in the transcription that are not intended.    Electronically signed by Pk East II, DPM on 3/3/2025 at 8:19 AM

## 2025-03-03 NOTE — PROGRESS NOTES
Dexa showed -2.8    Pneumonia     pneumonia    Restless legs syndrome     Sleep apnea     Inspire Implant      Past Surgical History:   Procedure Laterality Date    ABDOMEN SURGERY  1986    hysterectomy    APPENDECTOMY      ARM SURGERY      BREAST BIOPSY Right     b9 excision about age30    COLONOSCOPY  2021    Dr Noe TA x 5,    ESOPHAGOGASTRODUODENOSCOPY  2017    Dr Noe    ESOPHAGOGASTRODUODENOSCOPY  2021    Dr Noe W Dilation    HYSTERECTOMY (CERVIX STATUS UNKNOWN)      age 40    KNEE ARTHROSCOPY  2002    meniscectomy    KNEE SURGERY      OVARY REMOVAL Bilateral     age 40    TOE SURGERY      TONSILLECTOMY        Social History     Socioeconomic History    Marital status:      Spouse name: None    Number of children: None    Years of education: None    Highest education level: None   Tobacco Use    Smoking status: Former     Current packs/day: 0.00     Average packs/day: 2.0 packs/day for 39.0 years (78.0 ttl pk-yrs)     Types: Cigarettes     Start date: 1967     Quit date: 2007     Years since quittin.1    Smokeless tobacco: Never    Tobacco comments:     smoked for 40 years - quite in    Vaping Use    Vaping status: Never Used   Substance and Sexual Activity    Alcohol use: Not Currently     Comment: occ    Drug use: Never    Sexual activity: Not Currently     Partners: Male     Comment: windowed in      Social Determinants of Health      [facility]    Family and Community Support   Intimate Partner Violence: Not At Risk (2024)    Received from [facility]    Abuse Screen     Feels Unsafe at Home or Work/School: no     Feels Threatened by Someone: no     Does Anyone Try to Keep You From Having Contact with Others or Doing Things Outside Your Home?: no     Physical Signs of Abuse Present: no    Received from [facility], [facility]    Housing Stability

## 2025-03-03 NOTE — PATIENT INSTRUCTIONS
call your doctor.   Call your doctor if you:  Become ill before surgery.  Need to reschedule.  Have changed your mind about having the surgery.  What happens before surgery?  Here are some things you can expect to happen before your surgery.  Your picture ID will be checked.  The area of your body that needs surgery is often marked to make sure there are no errors.  You will be kept comfortable and safe by your anesthesia provider. The anesthesia may make you sleep. Or it may just numb the area being worked on.  What happens when you are ready to go home?  Be sure you have someone drive you home. Anesthesia and pain medicine make it unsafe for you to drive. You will get instructions about recovering from your surgery. This is called a discharge plan. It will cover things like diet, wound care, follow-up care, driving, and getting back to your normal routine.  Follow-up care is a key part of your treatment and safety. Be sure to make and go to all appointments, and call your doctor if you are having problems. It's also a good idea to know your test results and keep a list of the medicines you take.  Where can you learn more?  Go to https://[email or web address].net/patientEd and enter Q270 to learn more about \"Learning About How to Prepare for Surgery.\"  Current as of: July 26, 2023  Content Version: 14.1  © 2006-2024 [employer].   Care instructions adapted under license by [employer]. If you have questions about a medical condition or this instruction, always ask your healthcare professional. [employer] disclaims any warranty or liability for your use of this information.            Metatarsal Phalangeal Joint Fusion: What to Expect at Home  Your Recovery     You had surgery on your foot to fuse the joint at the base of your toe. After your surgery, your foot may be red and swollen. Pain and swelling should slowly improve over the next 6 weeks. You may have some minor pain and swelling that

## 2025-03-06 ENCOUNTER — TELEPHONE (OUTPATIENT)
Age: 75
End: 2025-03-06

## 2025-03-07 NOTE — TELEPHONE ENCOUNTER
Returned call to patient. Patient scheduled surgery with Dr. Hermosillo for 3/20/25 at [facility]. Pre op instructions were given, patient verbalized understanding.

## 2025-03-17 ENCOUNTER — PRE-ADMISSION TESTING (OUTPATIENT)
Dept: PREADMISSION TESTING | Facility: HOSPITAL | Age: 75
End: 2025-03-17
Payer: MEDICARE

## 2025-03-17 VITALS
RESPIRATION RATE: 20 BRPM | WEIGHT: 172.4 LBS | HEART RATE: 68 BPM | BODY MASS INDEX: 25.53 KG/M2 | HEIGHT: 69 IN | OXYGEN SATURATION: 97 % | SYSTOLIC BLOOD PRESSURE: 135 MMHG | DIASTOLIC BLOOD PRESSURE: 46 MMHG

## 2025-03-17 LAB
ANION GAP SERPL CALCULATED.3IONS-SCNC: 9 MMOL/L (ref 5–15)
BUN SERPL-MCNC: 21 MG/DL (ref 8–23)
BUN/CREAT SERPL: 25.6 (ref 7–25)
CALCIUM SPEC-SCNC: 9.1 MG/DL (ref 8.6–10.5)
CHLORIDE SERPL-SCNC: 105 MMOL/L (ref 98–107)
CO2 SERPL-SCNC: 24 MMOL/L (ref 22–29)
CREAT SERPL-MCNC: 0.82 MG/DL (ref 0.57–1)
DEPRECATED RDW RBC AUTO: 41 FL (ref 37–54)
EGFRCR SERPLBLD CKD-EPI 2021: 75.2 ML/MIN/1.73
ERYTHROCYTE [DISTWIDTH] IN BLOOD BY AUTOMATED COUNT: 12.3 % (ref 12.3–15.4)
GLUCOSE SERPL-MCNC: 94 MG/DL (ref 65–99)
HCT VFR BLD AUTO: 42.8 % (ref 34–46.6)
HGB BLD-MCNC: 14.6 G/DL (ref 12–15.9)
MCH RBC QN AUTO: 31.3 PG (ref 26.6–33)
MCHC RBC AUTO-ENTMCNC: 34.1 G/DL (ref 31.5–35.7)
MCV RBC AUTO: 91.6 FL (ref 79–97)
PLATELET # BLD AUTO: 252 10*3/MM3 (ref 140–450)
PMV BLD AUTO: 9.6 FL (ref 6–12)
POTASSIUM SERPL-SCNC: 4.4 MMOL/L (ref 3.5–5.2)
RBC # BLD AUTO: 4.67 10*6/MM3 (ref 3.77–5.28)
SODIUM SERPL-SCNC: 138 MMOL/L (ref 136–145)
WBC NRBC COR # BLD AUTO: 4.54 10*3/MM3 (ref 3.4–10.8)

## 2025-03-17 PROCEDURE — 85027 COMPLETE CBC AUTOMATED: CPT

## 2025-03-17 PROCEDURE — 36415 COLL VENOUS BLD VENIPUNCTURE: CPT

## 2025-03-17 PROCEDURE — 93005 ELECTROCARDIOGRAM TRACING: CPT

## 2025-03-17 PROCEDURE — 80048 BASIC METABOLIC PNL TOTAL CA: CPT

## 2025-03-17 NOTE — DISCHARGE INSTRUCTIONS

## 2025-03-19 ENCOUNTER — ANESTHESIA EVENT (OUTPATIENT)
Dept: PERIOP | Facility: HOSPITAL | Age: 75
End: 2025-03-19
Payer: MEDICARE

## 2025-03-20 ENCOUNTER — HOSPITAL ENCOUNTER (OUTPATIENT)
Facility: HOSPITAL | Age: 75
Setting detail: HOSPITAL OUTPATIENT SURGERY
Discharge: HOME | End: 2025-03-20
Attending: PODIATRIST | Admitting: PODIATRIST
Payer: MEDICARE

## 2025-03-20 ENCOUNTER — APPOINTMENT (OUTPATIENT)
Dept: GENERAL RADIOLOGY | Facility: HOSPITAL | Age: 75
End: 2025-03-20
Payer: MEDICARE

## 2025-03-20 ENCOUNTER — ANESTHESIA (OUTPATIENT)
Dept: PERIOP | Facility: HOSPITAL | Age: 75
End: 2025-03-20
Payer: MEDICARE

## 2025-03-20 VITALS
OXYGEN SATURATION: 99 % | RESPIRATION RATE: 16 BRPM | HEART RATE: 66 BPM | TEMPERATURE: 97.9 F | DIASTOLIC BLOOD PRESSURE: 43 MMHG | SYSTOLIC BLOOD PRESSURE: 116 MMHG

## 2025-03-20 DIAGNOSIS — M19.071 PRIMARY OSTEOARTHRITIS OF RIGHT FOOT: Primary | ICD-10-CM

## 2025-03-20 PROCEDURE — 25010000002 ONDANSETRON PER 1 MG

## 2025-03-20 PROCEDURE — 73620 X-RAY EXAM OF FOOT: CPT

## 2025-03-20 PROCEDURE — 25010000002 PROPOFOL 10 MG/ML EMULSION

## 2025-03-20 PROCEDURE — 25010000002 FENTANYL CITRATE (PF) 100 MCG/2ML SOLUTION

## 2025-03-20 PROCEDURE — C1713 ANCHOR/SCREW BN/BN,TIS/BN: HCPCS | Performed by: PODIATRIST

## 2025-03-20 PROCEDURE — 25810000003 LACTATED RINGERS PER 1000 ML: Performed by: PODIATRIST

## 2025-03-20 PROCEDURE — 25010000002 ROPIVACAINE PER 1 MG: Performed by: PODIATRIST

## 2025-03-20 PROCEDURE — 76000 FLUOROSCOPY <1 HR PHYS/QHP: CPT

## 2025-03-20 PROCEDURE — 25010000002 DEXAMETHASONE PER 1 MG

## 2025-03-20 PROCEDURE — 25010000002 CEFAZOLIN PER 500 MG: Performed by: PODIATRIST

## 2025-03-20 PROCEDURE — 25010000002 LIDOCAINE PF 2% 2 % SOLUTION

## 2025-03-20 PROCEDURE — C1776 JOINT DEVICE (IMPLANTABLE): HCPCS | Performed by: PODIATRIST

## 2025-03-20 DEVICE — RAPID COMPRESSION IMPLANTS
Type: IMPLANTABLE DEVICE | Site: TOES | Status: FUNCTIONAL
Brand: TMC SPEEDMTP

## 2025-03-20 DEVICE — LOCKING SCREWS
Type: IMPLANTABLE DEVICE | Site: TOES | Status: FUNCTIONAL
Brand: TMC SPEEDMTP

## 2025-03-20 DEVICE — HEADLESS COMPRESSION SCREWS
Type: IMPLANTABLE DEVICE | Site: TOES | Status: FUNCTIONAL
Brand: SPEEDMTP COMPRESSION SCREWS

## 2025-03-20 DEVICE — ALLOGRFT CORT NMP FIBR FZD 2.5CC SM LNG STRL: Type: IMPLANTABLE DEVICE | Site: TOES | Status: FUNCTIONAL

## 2025-03-20 DEVICE — ALLOGRFT CORT NMP FIBR FZD 1.2CC XS LNG STRL: Type: IMPLANTABLE DEVICE | Site: TOES | Status: FUNCTIONAL

## 2025-03-20 RX ORDER — SODIUM CHLORIDE 0.9 % (FLUSH) 0.9 %
3 SYRINGE (ML) INJECTION AS NEEDED
Status: DISCONTINUED | OUTPATIENT
Start: 2025-03-20 | End: 2025-03-20 | Stop reason: HOSPADM

## 2025-03-20 RX ORDER — SODIUM CHLORIDE, SODIUM LACTATE, POTASSIUM CHLORIDE, CALCIUM CHLORIDE 600; 310; 30; 20 MG/100ML; MG/100ML; MG/100ML; MG/100ML
100 INJECTION, SOLUTION INTRAVENOUS CONTINUOUS
Status: DISCONTINUED | OUTPATIENT
Start: 2025-03-20 | End: 2025-03-20 | Stop reason: HOSPADM

## 2025-03-20 RX ORDER — SODIUM CHLORIDE 0.9 % (FLUSH) 0.9 %
3-10 SYRINGE (ML) INJECTION AS NEEDED
Status: DISCONTINUED | OUTPATIENT
Start: 2025-03-20 | End: 2025-03-20 | Stop reason: HOSPADM

## 2025-03-20 RX ORDER — LIDOCAINE HYDROCHLORIDE 20 MG/ML
INJECTION, SOLUTION EPIDURAL; INFILTRATION; INTRACAUDAL; PERINEURAL AS NEEDED
Status: DISCONTINUED | OUTPATIENT
Start: 2025-03-20 | End: 2025-03-20 | Stop reason: SURG

## 2025-03-20 RX ORDER — IBUPROFEN 600 MG/1
600 TABLET, FILM COATED ORAL EVERY 6 HOURS PRN
Status: DISCONTINUED | OUTPATIENT
Start: 2025-03-20 | End: 2025-03-20 | Stop reason: HOSPADM

## 2025-03-20 RX ORDER — SODIUM CHLORIDE, SODIUM LACTATE, POTASSIUM CHLORIDE, CALCIUM CHLORIDE 600; 310; 30; 20 MG/100ML; MG/100ML; MG/100ML; MG/100ML
1000 INJECTION, SOLUTION INTRAVENOUS CONTINUOUS
Status: DISCONTINUED | OUTPATIENT
Start: 2025-03-20 | End: 2025-03-20 | Stop reason: HOSPADM

## 2025-03-20 RX ORDER — SODIUM CHLORIDE 0.9 % (FLUSH) 0.9 %
3 SYRINGE (ML) INJECTION EVERY 12 HOURS SCHEDULED
Status: DISCONTINUED | OUTPATIENT
Start: 2025-03-20 | End: 2025-03-20 | Stop reason: HOSPADM

## 2025-03-20 RX ORDER — LIDOCAINE HYDROCHLORIDE 10 MG/ML
0.5 INJECTION, SOLUTION EPIDURAL; INFILTRATION; INTRACAUDAL; PERINEURAL ONCE AS NEEDED
Status: DISCONTINUED | OUTPATIENT
Start: 2025-03-20 | End: 2025-03-20 | Stop reason: HOSPADM

## 2025-03-20 RX ORDER — ONDANSETRON 2 MG/ML
INJECTION INTRAMUSCULAR; INTRAVENOUS AS NEEDED
Status: DISCONTINUED | OUTPATIENT
Start: 2025-03-20 | End: 2025-03-20 | Stop reason: SURG

## 2025-03-20 RX ORDER — LABETALOL HYDROCHLORIDE 5 MG/ML
5 INJECTION, SOLUTION INTRAVENOUS
Status: DISCONTINUED | OUTPATIENT
Start: 2025-03-20 | End: 2025-03-20 | Stop reason: HOSPADM

## 2025-03-20 RX ORDER — DEXAMETHASONE SODIUM PHOSPHATE 4 MG/ML
INJECTION, SOLUTION INTRA-ARTICULAR; INTRALESIONAL; INTRAMUSCULAR; INTRAVENOUS; SOFT TISSUE AS NEEDED
Status: DISCONTINUED | OUTPATIENT
Start: 2025-03-20 | End: 2025-03-20 | Stop reason: SURG

## 2025-03-20 RX ORDER — SODIUM CHLORIDE 9 MG/ML
40 INJECTION, SOLUTION INTRAVENOUS AS NEEDED
Status: DISCONTINUED | OUTPATIENT
Start: 2025-03-20 | End: 2025-03-20 | Stop reason: HOSPADM

## 2025-03-20 RX ORDER — EPHEDRINE SULFATE 50 MG/ML
INJECTION INTRAVENOUS AS NEEDED
Status: DISCONTINUED | OUTPATIENT
Start: 2025-03-20 | End: 2025-03-20 | Stop reason: SURG

## 2025-03-20 RX ORDER — OXYCODONE AND ACETAMINOPHEN 10; 325 MG/1; MG/1
1 TABLET ORAL EVERY 4 HOURS PRN
Status: DISCONTINUED | OUTPATIENT
Start: 2025-03-20 | End: 2025-03-20 | Stop reason: HOSPADM

## 2025-03-20 RX ORDER — ROPIVACAINE HYDROCHLORIDE 5 MG/ML
INJECTION, SOLUTION EPIDURAL; INFILTRATION; PERINEURAL AS NEEDED
Status: DISCONTINUED | OUTPATIENT
Start: 2025-03-20 | End: 2025-03-20 | Stop reason: HOSPADM

## 2025-03-20 RX ORDER — ONDANSETRON 2 MG/ML
4 INJECTION INTRAMUSCULAR; INTRAVENOUS
Status: DISCONTINUED | OUTPATIENT
Start: 2025-03-20 | End: 2025-03-20 | Stop reason: HOSPADM

## 2025-03-20 RX ORDER — NALOXONE HCL 0.4 MG/ML
0.04 VIAL (ML) INJECTION AS NEEDED
Status: DISCONTINUED | OUTPATIENT
Start: 2025-03-20 | End: 2025-03-20 | Stop reason: HOSPADM

## 2025-03-20 RX ORDER — PROPOFOL 10 MG/ML
VIAL (ML) INTRAVENOUS AS NEEDED
Status: DISCONTINUED | OUTPATIENT
Start: 2025-03-20 | End: 2025-03-20 | Stop reason: SURG

## 2025-03-20 RX ORDER — HYDROMORPHONE HYDROCHLORIDE 1 MG/ML
0.5 INJECTION, SOLUTION INTRAMUSCULAR; INTRAVENOUS; SUBCUTANEOUS
Status: DISCONTINUED | OUTPATIENT
Start: 2025-03-20 | End: 2025-03-20 | Stop reason: HOSPADM

## 2025-03-20 RX ORDER — MAGNESIUM HYDROXIDE 1200 MG/15ML
LIQUID ORAL AS NEEDED
Status: DISCONTINUED | OUTPATIENT
Start: 2025-03-20 | End: 2025-03-20 | Stop reason: HOSPADM

## 2025-03-20 RX ORDER — OXYCODONE AND ACETAMINOPHEN 7.5; 325 MG/1; MG/1
1 TABLET ORAL EVERY 4 HOURS PRN
Qty: 20 TABLET | Refills: 0 | Status: SHIPPED | OUTPATIENT
Start: 2025-03-20

## 2025-03-20 RX ORDER — ACETAMINOPHEN 500 MG
1000 TABLET ORAL ONCE
Status: COMPLETED | OUTPATIENT
Start: 2025-03-20 | End: 2025-03-20

## 2025-03-20 RX ORDER — FENTANYL CITRATE 50 UG/ML
50 INJECTION, SOLUTION INTRAMUSCULAR; INTRAVENOUS
Status: DISCONTINUED | OUTPATIENT
Start: 2025-03-20 | End: 2025-03-20 | Stop reason: HOSPADM

## 2025-03-20 RX ORDER — FENTANYL CITRATE 50 UG/ML
INJECTION, SOLUTION INTRAMUSCULAR; INTRAVENOUS AS NEEDED
Status: DISCONTINUED | OUTPATIENT
Start: 2025-03-20 | End: 2025-03-20 | Stop reason: SURG

## 2025-03-20 RX ORDER — DOCUSATE SODIUM 100 MG/1
100 CAPSULE, LIQUID FILLED ORAL 2 TIMES DAILY PRN
Qty: 60 CAPSULE | Refills: 0 | Status: SHIPPED | OUTPATIENT
Start: 2025-03-20

## 2025-03-20 RX ORDER — FLUMAZENIL 0.1 MG/ML
0.2 INJECTION INTRAVENOUS AS NEEDED
Status: DISCONTINUED | OUTPATIENT
Start: 2025-03-20 | End: 2025-03-20 | Stop reason: HOSPADM

## 2025-03-20 RX ORDER — ONDANSETRON 4 MG/1
4 TABLET, FILM COATED ORAL EVERY 4 HOURS
Qty: 20 TABLET | Refills: 0 | Status: SHIPPED | OUTPATIENT
Start: 2025-03-20

## 2025-03-20 RX ORDER — ENOXAPARIN SODIUM 100 MG/ML
40 INJECTION SUBCUTANEOUS DAILY
Qty: 12 ML | Refills: 0 | Status: SHIPPED | OUTPATIENT
Start: 2025-03-20 | End: 2025-04-19

## 2025-03-20 RX ADMIN — ONDANSETRON 4 MG: 2 INJECTION INTRAMUSCULAR; INTRAVENOUS at 08:11

## 2025-03-20 RX ADMIN — CEFAZOLIN 2000 MG: 2 INJECTION, POWDER, FOR SOLUTION INTRAMUSCULAR; INTRAVENOUS at 07:06

## 2025-03-20 RX ADMIN — PROPOFOL 150 MG: 10 INJECTION, EMULSION INTRAVENOUS at 07:03

## 2025-03-20 RX ADMIN — FENTANYL CITRATE 50 MCG: 50 INJECTION, SOLUTION INTRAMUSCULAR; INTRAVENOUS at 07:03

## 2025-03-20 RX ADMIN — SODIUM CHLORIDE, POTASSIUM CHLORIDE, SODIUM LACTATE AND CALCIUM CHLORIDE 1000 ML: 600; 310; 30; 20 INJECTION, SOLUTION INTRAVENOUS at 06:05

## 2025-03-20 RX ADMIN — ACETAMINOPHEN 1000 MG: 500 TABLET ORAL at 06:55

## 2025-03-20 RX ADMIN — OXYCODONE AND ACETAMINOPHEN 1 TABLET: 325; 10 TABLET ORAL at 08:48

## 2025-03-20 RX ADMIN — LIDOCAINE HYDROCHLORIDE 100 MG: 20 INJECTION, SOLUTION EPIDURAL; INFILTRATION; INTRACAUDAL; PERINEURAL at 07:03

## 2025-03-20 RX ADMIN — FENTANYL CITRATE 25 MCG: 50 INJECTION, SOLUTION INTRAMUSCULAR; INTRAVENOUS at 07:37

## 2025-03-20 RX ADMIN — EPHEDRINE SULFATE 15 MG: 50 INJECTION INTRAVENOUS at 07:27

## 2025-03-20 RX ADMIN — DEXAMETHASONE SODIUM PHOSPHATE 4 MG: 4 INJECTION, SOLUTION INTRA-ARTICULAR; INTRALESIONAL; INTRAMUSCULAR; INTRAVENOUS; SOFT TISSUE at 08:11

## 2025-03-20 RX ADMIN — FENTANYL CITRATE 25 MCG: 50 INJECTION, SOLUTION INTRAMUSCULAR; INTRAVENOUS at 07:20

## 2025-03-20 NOTE — ANESTHESIA PROCEDURE NOTES
Airway  Date/Time: 3/20/2025 7:05 AM    General Information and Staff    Patient location during procedure: OR  CRNA/CAA: Jeanna Hopkins CRNA    Indications and Patient Condition  Indications for airway management: airway protection    Preoxygenated: yes    Mask difficulty assessment: 0 - not attempted    Final Airway Details    Final airway type: supraglottic airway      Successful airway: I-gel  Size: 4   Number of attempts at approach: 1  Assessment: lips, teeth, and gum same as pre-op and atraumatic intubation

## 2025-03-20 NOTE — ANESTHESIA POSTPROCEDURE EVALUATION
Patient: Earline GOINS    Procedure Summary       Date: 03/20/25 Room / Location:  [facility] OR [address] [place] [address] [facility] OR    Anesthesia Start: 0658 Anesthesia Stop: 0835    Procedure: REMOVAL OF HER CARTIVA IMPLANT AND CONVERTING HER FOOT TO A FIRST METATARSOPHALANGEAL JOINT ARTHRODEISIS WITH BONE GRAFT HARVEST FROM HER CALCANEUS (Right: Toes) Diagnosis:       Primary osteoarthritis of right foot      Right foot pain      (Primary osteoarthritis first metatarsal phalangeal joint right foot, right foot pain)    Surgeons: Pk East DPM Provider: Jeanna Hopkins CRNA    Anesthesia Type: general ASA Status: 2            Anesthesia Type: general    Vitals  Vitals Value Taken Time   /37 03/20/25 09:13   Temp 97.9 °F (36.6 °C) 03/20/25 08:32   Pulse 65 03/20/25 09:13   Resp 16 03/20/25 09:13   SpO2 97 % 03/20/25 09:13           Post Anesthesia Care and Evaluation    Patient location during evaluation: PACU  Patient participation: complete - patient participated  Level of consciousness: awake and alert  Pain management: adequate    Airway patency: patent  Anesthetic complications: No anesthetic complications    Cardiovascular status: acceptable  Respiratory status: acceptable  Hydration status: acceptable    Comments: Blood pressure (!) 108/37, pulse 65, temperature 97.9 °F (36.6 °C), temperature source Temporal, resp. rate 16, SpO2 97%, not currently breastfeeding.    Pt discharged from PACU based on tori score >8

## 2025-03-20 NOTE — ANESTHESIA PREPROCEDURE EVALUATION
Anesthesia Evaluation     Patient summary reviewed and Nursing notes reviewed   no history of anesthetic complications:   NPO Solid Status: > 8 hours             Airway   Mallampati: I  TM distance: >3 FB  Neck ROM: full  No difficulty expected  Dental          Pulmonary    (+) a smoker Former, COPD,sleep apnea  Cardiovascular   Exercise tolerance: good (4-7 METS)    (+) valvular problems/murmurs (PFO s/p closure), hyperlipidemia      Neuro/Psych  (-) seizures, TIA, CVA  GI/Hepatic/Renal/Endo    (+) GERD  (-) liver disease, no renal disease, diabetes    Musculoskeletal     Abdominal    Substance History      OB/GYN          Other                          Anesthesia Plan    ASA 2     general     intravenous induction     Anesthetic plan, risks, benefits, and alternatives have been provided, discussed and informed consent has been obtained with: patient.        CODE STATUS:

## 2025-03-20 NOTE — OP NOTE
TOE INTERPHALANGEAL JOINT/METATARSOPHALANGEAL JOINT FUSION  Procedure Note    Earline GOINS  3/20/2025    Pre-op Diagnosis:   Primary osteoarthritis first metatarsal phalangeal joint right foot, right foot pain retained Cartee the implant at the first metatarsal phalangeal joint    Post-op Diagnosis:     Post-Op Diagnosis Codes:     * Primary osteoarthritis of right foot [M19.071]     * Right foot pain [M79.671]   Retained implant foot    Procedure/CPT Codes:   No CPT Code Applied in Case Entry    Procedure(s):  Procedure #1 first metatarsal phalangeal joint arthrodesis with removal of Cartiva implant    Procedure #2 bone graft harvest calcaneus minor    Surgeon(s):  Pk East DPM    Anesthesia: General    Staff:   Circulator: Piero Pathak RN  Radiology Technologist: Sierra Vargas  Scrub Person: Evens Sorensen; Zahida Carter  Vendor Representative: Loyd Richards     was responsible for performing the following activities: Retraction and their skilled assistance was necessary for the success of this case.    Indications for procedure:  Pain    Procedure details:  Patient brought the operating placed under general anesthesia.  Local anesthesia was performed with 30 cc 0.5% Naropin plain.  Right foot prepped and draped in usual sterile fashion.  Following procedures were performed.    Procedure 1 first metatarsal phalangeal joint arthrodesis with removal of Cartiva implant.    Attention was directed the first metatarsal phalangeal joint where a curvilinear incision made over previous cicatrix.  Is deepened with sharp and blunt dissection technique.  A linear capsular incision was made and the joint was exposed.  Dorsal spurring was resected with a rongeur.  The joint was opened and McGlamery elevator was used to free the soft tissue attachments plantarly.  The Cartiva implant was removed with a dental pick.  There was significant inflammatory soft tissue surrounding the implant.  That was  removed from the first metatarsal head with a curette.  Wound was irrigated.  The reamer was then used to ream the remaining first metatarsal head.    Attention then directed the base of the proximal phalanx.  Again the reamers were used.  This did extend into the significant cystic area at the base of the proximal phalanx.  A curette was used to remove all of this cystic area.  Wounds and irrigated.  These areas were fenestrated.    Procedure #2 bone graft harvest calcaneus minor right    Attention was then directed lateral wall calcaneus where a linear incision was made.  Deepened with sharp and blunt dissection technique.  A periosteal elevator was used to elevate periosteum from the lateral wall the calcaneus.  A bone graft harvester from [employer] was used to harvest approximately 4 cc of bone graft in the calcaneus.  This was then backfilled with bone graft.  Closure performed in layers.    Attention was then directed back to the first metatarsal phalangeal joint.  The arthrodesis site was fenestrated.  After evaluation of bone graft from the calcaneus additional bone graft was needed.  A small induce bone graft was opened.  This was used completely to packed the deep portions of the proximal phalanx and the metatarsal head.  Even with the remainder the bone graft additional graft was needed dorsally.  An additional small induced bone graft was open.  The cancellous bone harvested from the calcaneus was used at the fusion site over the induce at the defects and throughout the plantar aspect the joint.  The remaining induce was used throughout the joint.  This was used in its entirety.  Temporary fixation was performed.  A template from [employer] was then placed dorsally.  The guide was then used to place a guidewire from distal medial to proximal lateral.  A 34 mm screw was placed initially.  Drill holes were then created in the template.  The template was then removed.  The plate was then press-fit into place.  The  screw holes were drilled.  Screws were then placed.  The plate was then energized and the small rods were removed.  X-ray exam was performed.  The initially placed lag screw was a little bit long.  Was exchanged from a 30 to a 34 mm.  Additional x-rays were performed showing excellent length.  AP and lateral views were placed.  Capsular tissues were closed with 2-0 Vicryl.  Subcutaneous tissues were closed with 3-0 Vicryl and skin is were closed with 3-0 nylon.  Medic compression bandage with posterior splint was applied.        Estimated Blood Loss: none    Specimens:                None      Drains: * No LDAs found *    Implants:   Implant Name Type Inv. Item Serial No.  Lot No. LRB No. Used Action   COMP RAPD/COMPR SPEEDPLATE SPEEDMTP LG - [id number] Implant COMP RAPD/COMPR SPEEDPLATE SPEEDMTP LG  [employer]  Right 1 Implanted   SCRW COMPR SPEEDMTP NOHEAD - [id number] Implant SCRW COMPR SPEEDMTP NOHEAD  [employer] [id number] Right 1 Implanted   SpeedMTP FastPitch 3.0mm Screws    [employer] [id number] Right 1 Implanted   ALLOGRFT JUSTUS NMP FIBR FZD 1.2CC XS LNG STRL - [id number] - [id number] Implant ALLOGRFT JUSTUS NMP FIBR FZD 1.2CC XS LNG STRL [id number] [employer]  Right 1 Implanted   ALLOGRFT JUSTUS NMP FIBR FZD 2.5CC SM LNG STRL - [id number] - [id number] Implant ALLOGRFT JUSTUS NMP FIBR FZD 2.5CC SM LNG STRL [id number] [employer]  Right 1 Implanted   ALLOGRFT JUSTUS NMP FIBR FZD 2.5CC SM LNG STRL - [id number] - [id number] Implant ALLOGRFT JUSTUS NMP FIBR FZD 2.5CC SM LNG STRL [id number] [employer]  Right 1 Implanted        Complications: none           Follow up:   Nonweightbearing.  3 to 5 days for follow-up.  Prescriptions for Percocet, Zofran And Lovenox were given.    Pk East DPM     Date: 3/20/2025  Time: 08:20 CDT

## 2025-03-24 LAB
QT INTERVAL: 434 MS
QTC INTERVAL: 444 MS

## 2025-03-27 ENCOUNTER — OFFICE VISIT (OUTPATIENT)
Age: 75
End: 2025-03-27

## 2025-03-27 VITALS — HEIGHT: 69 IN | WEIGHT: 165 LBS | BODY MASS INDEX: 24.44 KG/M2

## 2025-03-27 DIAGNOSIS — M19.071 PRIMARY OSTEOARTHRITIS OF RIGHT FOOT: ICD-10-CM

## 2025-03-27 DIAGNOSIS — Z47.89 AFTERCARE FOLLOWING SURGERY OF THE MUSCULOSKELETAL SYSTEM: Primary | ICD-10-CM

## 2025-03-27 PROCEDURE — 99024 POSTOP FOLLOW-UP VISIT: CPT | Performed by: NURSE PRACTITIONER

## 2025-03-27 RX ORDER — HYDROCODONE BITARTRATE AND ACETAMINOPHEN 7.5; 325 MG/1; MG/1
1 TABLET ORAL EVERY 8 HOURS PRN
Qty: 15 TABLET | Refills: 0 | Status: SHIPPED | OUTPATIENT
Start: 2025-03-27 | End: 2025-04-01

## 2025-03-27 ASSESSMENT — ENCOUNTER SYMPTOMS
VOMITING: 0
NAUSEA: 0
COLOR CHANGE: 0
CONSTIPATION: 0
DIARRHEA: 0
COUGH: 0
SHORTNESS OF BREATH: 0
BLOOD IN STOOL: 0

## 2025-03-27 NOTE — PROGRESS NOTES
[facility]  [facility]  [address]  [facility] [id number] [phone]     Patient: Anna Sanderson   YOB: 1950   Date: 3/27/2025   Visit Type:  Post op    Body Part: Foot right    What was the date of surgery? 3/20/25    Patient states her foot hurts with a pain scale of 4-5.  This morning, it was hurting really bad.    Review of Systems    Review of Systems   Constitutional:  Negative for appetite change, chills, fatigue and fever.   Respiratory:  Negative for cough and shortness of breath.    Cardiovascular:  Negative for chest pain, palpitations and leg swelling.   Gastrointestinal:  Negative for blood in stool, constipation, diarrhea, nausea and vomiting.   Musculoskeletal:  Negative for arthralgias, gait problem and joint swelling.   Skin:  Negative for color change and wound.   Neurological:  Negative for weakness.        
Vomiting     Negative  Urinary Incontinence   Endocrine  Negative  Weight Gain and Weight Loss  MS   Negative  Except as noted in HPI and Chief Complaint    Ht 1.753 m (5' 9\")   Wt 74.8 kg (165 lb)   BMI 24.37 kg/m²      Physical Exam      Physical Exam   Physical Examination:  General: The patient is a Well Nourished 75 y.o. female who is alert and oriented x3 in no distress. The patient is cooperative during the exam.  Sutures intact.  Wound edges well-approximated.  Edema to the hallux.  Negative Homans' sign.    Results      Imaging:    Foot Xray    Foot Xray 3 views. right  taken in office today. Include AP, lateral, lateral oblique, reveal plate and screw fixation overlying the first metatarsal phalangeal joint arthrodesis site.  Hallux in adequate alignment.  Adequate. Bone Density. Image quality is excellent. Interpreted by RM Cleary   Assessment & Plan      Plan    ICD-10-CM    1. Aftercare following surgery of the musculoskeletal system  Z47.89       2. Primary osteoarthritis of right foot  M19.071            Plan Narrative  Assessment & Plan  1. Post-operative status following right foot surgery.  . She has been advised to maintain non-weightbearing status for a duration of 4 weeks. The sutures will be removed in 2 weeks. She has been instructed to keep the surgical site clean and dry, and to avoid wetting the incision. She may cleanse the area surrounding the incision with a washcloth, soap, and water, but must ensure the incision itself remains dry. A prescription for Norco hydrocodone has been provided for pain management. She has been advised to contact us immediately should there be any changes or complications related to the incision.    She does need a refill of pain medication.  She was prescribed hydrocodone 7.5/325 mg 1 tablet by mouth every 8 hours as needed for pain number 15 tablets no refills.  PROCEDURE  The patient underwent right foot surgery on 03/20/2025, which included

## 2025-04-10 ENCOUNTER — OFFICE VISIT (OUTPATIENT)
Age: 75
End: 2025-04-10

## 2025-04-10 DIAGNOSIS — Z47.89 AFTERCARE FOLLOWING SURGERY OF THE MUSCULOSKELETAL SYSTEM: ICD-10-CM

## 2025-04-10 DIAGNOSIS — M19.071 PRIMARY OSTEOARTHRITIS OF RIGHT FOOT: Primary | ICD-10-CM

## 2025-04-10 PROCEDURE — 99024 POSTOP FOLLOW-UP VISIT: CPT | Performed by: NURSE PRACTITIONER

## 2025-04-10 RX ORDER — ONDANSETRON 4 MG/1
4 TABLET, FILM COATED ORAL EVERY 4 HOURS
COMMUNITY
Start: 2025-03-20

## 2025-04-10 RX ORDER — PSEUDOEPHEDRINE HCL 30 MG
100 TABLET ORAL 2 TIMES DAILY PRN
COMMUNITY
Start: 2025-03-20

## 2025-04-10 RX ORDER — OXYCODONE AND ACETAMINOPHEN 7.5; 325 MG/1; MG/1
1 TABLET ORAL EVERY 4 HOURS PRN
COMMUNITY
Start: 2025-03-20

## 2025-04-10 RX ORDER — ENOXAPARIN SODIUM 100 MG/ML
40 INJECTION SUBCUTANEOUS DAILY
COMMUNITY
Start: 2025-03-20 | End: 2025-04-20

## 2025-04-10 NOTE — PROGRESS NOTES
[facility]  [facility]  [address]  [facility] [id number] [phone]     Patient: Anna Sanderson   YOB: 1950   Date: 4/10/2025   Visit Type:      History of Present Illness  Chief Complaint   Patient presents with    Post Op Right Foot        This is a 75 y.o. female presents today 3 weeks status post right foot surgery.  She presents today for suture removal.  She is nonweightbearing.  History of Present Illness      Past Medical History:   Diagnosis Date    Arthritis     arthritus in fingers    Bursitis ?    had a few times    Cancer (HCC)     precancerous & cancerous on legs    COPD (chronic obstructive pulmonary disease) (HCC)     COVID-19     in Feb. and Oct.    Depression     Fractures  &    foot and wrist    GERD (gastroesophageal reflux disease)     GERD    Osteoporosis     Dexa showed -2.8    Pneumonia     pneumonia    Restless legs syndrome     Sleep apnea     Inspire Implant      Past Surgical History:   Procedure Laterality Date    ABDOMEN SURGERY      hysterectomy    APPENDECTOMY      ARM SURGERY      BREAST BIOPSY Right     b9 excision about age30    COLONOSCOPY  2021    Dr Noe TA x 5,    ESOPHAGOGASTRODUODENOSCOPY  2017    Dr Noe    ESOPHAGOGASTRODUODENOSCOPY  2021    Dr Noe W Dilation    FOOT SURGERY Right 2025    HYSTERECTOMY (CERVIX STATUS UNKNOWN)      age 40    KNEE ARTHROSCOPY  2002    meniscectomy    KNEE SURGERY      OVARY REMOVAL Bilateral     age 40    TOE SURGERY      TONSILLECTOMY        Social History     Socioeconomic History    Marital status:    Tobacco Use    Smoking status: Former     Current packs/day: 0.00     Average packs/day: 2.0 packs/day for 39.0 years (78.0 ttl pk-yrs)     Types: Cigarettes     Start date: 1967     Quit date: 2007     Years since quittin.2    Smokeless tobacco: Never    Tobacco

## 2025-04-10 NOTE — PROGRESS NOTES
[facility]  [facility]  [address]  [facility] [id number] [phone]     Patient: Anna Sanderson   YOB: 1950   Date: 4/10/2025   Visit Type:  Post op    Body Part: foot right    What was the date of surgery? Date of Surgery: 03/20/2025    Procedures   WV REMOVAL IMPLANT DEEP   WV ARTHRODESIS GREAT TOE METATARSOPHALANGEAL JOINT   REMOVAL OF HER CARTIVA IMPLANT AND CONVERTING HER FOOT TO A FIRST METATARSOPHALANGEAL JOINT ARTHRODEISIS WITH BONE GRAFT HARVEST FROM HER CALCANEUS     Pt states that the right foot heal hurts sometimes but not all the time. Pt came into the clinic today using crutches and still has sutures in.     Patient states     Review of Systems    Review of Systems

## 2025-05-15 ENCOUNTER — OFFICE VISIT (OUTPATIENT)
Age: 75
End: 2025-05-15

## 2025-05-15 VITALS — BODY MASS INDEX: 24.44 KG/M2 | WEIGHT: 165 LBS | HEIGHT: 69 IN

## 2025-05-15 DIAGNOSIS — M20.21 HALLUX RIGIDUS OF RIGHT FOOT: ICD-10-CM

## 2025-05-15 DIAGNOSIS — Z47.89 AFTERCARE FOLLOWING SURGERY OF THE MUSCULOSKELETAL SYSTEM: Primary | ICD-10-CM

## 2025-05-15 ASSESSMENT — ENCOUNTER SYMPTOMS
DIARRHEA: 0
NAUSEA: 0
COLOR CHANGE: 0
VOMITING: 0
SHORTNESS OF BREATH: 0
COUGH: 0
CONSTIPATION: 0
BLOOD IN STOOL: 0

## 2025-05-15 NOTE — PROGRESS NOTES
[facility]  [facility]  [address]  [facility] [id number] [phone]     Patient: Anna Sanderson   YOB: 1950   Date: 5/15/2025   Visit Type:      History of Present Illness  Chief Complaint   Patient presents with    Follow-up     Right foot         History of Present Illness  The patient is a 75-year-old female who presents today for an x-ray evaluation following right foot surgery, which included first metatarsophalangeal arthrodesis with removal of Cartiva implant and bone graft harvest from the calcaneus. The surgery was performed on 03/20/2025, making her 8 weeks post-procedure.    She reports swelling in her right foot, accompanied by numbness and discomfort. Despite these symptoms, she notes an overall improvement in her pain levels. She has been utilizing a compression stocking, which she finds beneficial in managing the swelling.  Overall decrease in pain    Past Medical History:   Diagnosis Date    Arthritis     arthritus in fingers    Bursitis ?    had a few times    Cancer ([facility]) 2023    precancerous & cancerous on legs    COPD (chronic obstructive pulmonary disease) ([facility])     COVID-19 2024    in Feb. and Oct.    Depression     Fractures 2020 &2022    foot and wrist    GERD (gastroesophageal reflux disease) 2002    GERD    Osteoporosis 2023    Dexa showed -2.8    Pneumonia 2024    pneumonia    Restless legs syndrome     Sleep apnea     Inspire Implant      Past Surgical History:   Procedure Laterality Date    ABDOMEN SURGERY  1986    hysterectomy    APPENDECTOMY      ARM SURGERY      BREAST BIOPSY Right     b9 excision about age32    COLONOSCOPY  04/29/2021    Dr Noe TA x 5,    ESOPHAGOGASTRODUODENOSCOPY  05/12/2017    Dr Noe    ESOPHAGOGASTRODUODENOSCOPY  04/29/2021    Dr Noe W Dilation    FOOT SURGERY Right 03/20/2025    HYSTERECTOMY (CERVIX STATUS UNKNOWN)  1990    age 40    KNEE ARTHROSCOPY  2002

## 2025-05-15 NOTE — PROGRESS NOTES
[facility]  [facility]  [address]  [facility] [id number] [phone]     Patient: Anna Sanderson   YOB: 1950   Date: 5/15/2025   Visit Type:  Follow up    Body Part: Foot right    What was the date of surgery? 3/20/25    Patient states her foot has been swelling.  Reports no pain other than the pain from swelling.  She has been up on her feet a lot.  States the foot feels tight, and she might have hurt her knee with her gait.    Review of Systems    Review of Systems   Constitutional:  Negative for appetite change, chills, fatigue and fever.   Respiratory:  Negative for cough and shortness of breath.    Cardiovascular:  Negative for chest pain, palpitations and leg swelling.   Gastrointestinal:  Negative for blood in stool, constipation, diarrhea, nausea and vomiting.   Musculoskeletal:  Negative for arthralgias, gait problem and joint swelling.   Skin:  Negative for color change and wound.   Neurological:  Negative for weakness.

## 2025-06-11 LAB
ADV 40+41 DNA STL QL NAA+NON-PROBE: NOT DETECTED
C CAYETANENSIS DNA STL QL NAA+NON-PROBE: NOT DETECTED
C COLI+JEJ+UPSA DNA STL QL NAA+NON-PROBE: NOT DETECTED
C DIFF TOX A+B STL QL IA: NORMAL
CRYPTOSP DNA STL QL NAA+NON-PROBE: NOT DETECTED
E HISTOLYT DNA STL QL NAA+NON-PROBE: NOT DETECTED
EAEC PAA PLAS AGGR+AATA ST NAA+NON-PRB: NOT DETECTED
EC STX1+STX2 GENES STL QL NAA+NON-PROBE: NOT DETECTED
EPEC EAE GENE STL QL NAA+NON-PROBE: DETECTED
ETEC LTA+ST1A+ST1B TOX ST NAA+NON-PROBE: NOT DETECTED
G LAMBLIA DNA STL QL NAA+NON-PROBE: NOT DETECTED
GI PATH DNA+RNA PNL STL NAA+NON-PROBE: NOT DETECTED
NOROVIRUS GI+II RNA STL QL NAA+NON-PROBE: NOT DETECTED
P SHIGELLOIDES DNA STL QL NAA+NON-PROBE: NOT DETECTED
RVA RNA STL QL NAA+NON-PROBE: NOT DETECTED
S ENT+BONG DNA STL QL NAA+NON-PROBE: NOT DETECTED
SAPO I+II+IV+V RNA STL QL NAA+NON-PROBE: NOT DETECTED
SHIGELLA SP+EIEC IPAH ST NAA+NON-PROBE: NOT DETECTED
V CHOL+PARA+VUL DNA STL QL NAA+NON-PROBE: NOT DETECTED
V CHOLERAE DNA STL QL NAA+NON-PROBE: NOT DETECTED
Y ENTEROCOL DNA STL QL NAA+NON-PROBE: NOT DETECTED

## 2025-06-24 ENCOUNTER — TELEPHONE (OUTPATIENT)
Dept: NEUROLOGY | Age: 75
End: 2025-06-24

## 2025-06-24 NOTE — TELEPHONE ENCOUNTER
Anna Burrell forgot about her extended office appt with Marleny Rivas for her 1 year inspire appt today at 1 PM    Please call her back at [phone] to   Re-schedule     Thank you

## 2025-06-26 ENCOUNTER — OFFICE VISIT (OUTPATIENT)
Age: 75
End: 2025-06-26

## 2025-06-26 VITALS — BODY MASS INDEX: 24.44 KG/M2 | HEIGHT: 69 IN | WEIGHT: 165 LBS

## 2025-06-26 DIAGNOSIS — Z47.89 AFTERCARE FOLLOWING SURGERY OF THE MUSCULOSKELETAL SYSTEM: Primary | ICD-10-CM

## 2025-06-26 DIAGNOSIS — M20.21 HALLUX RIGIDUS OF RIGHT FOOT: ICD-10-CM

## 2025-06-26 ASSESSMENT — ENCOUNTER SYMPTOMS
DIARRHEA: 0
SHORTNESS OF BREATH: 0
CONSTIPATION: 0
COLOR CHANGE: 0
COUGH: 0
BLOOD IN STOOL: 0
VOMITING: 0
NAUSEA: 0

## 2025-06-26 NOTE — PROGRESS NOTES
[facility]  [facility]  [address]  [facility] [id number] [phone]     Patient: Anna Sanderson   YOB: 1950   Date: 6/26/2025   Visit Type:  Post op    Body Part: FOOT, right    What was the date of surgery? 03/20/2025    Patient states     Review of Systems    Review of Systems     
     [facility]  [facility]  [address]  [facility] [id number] [phone]     Patient: Anna Sanderson   YOB: 1950   Date: 6/26/2025   Visit Type:  Follow up    Body Part: Foot right    What was the date of surgery? 03/20/2025     Patient states her foot still swells and her 2nd toe is a concern to her due to it getting real dark, as if there is not much blood circulation to it.  Reports some pain the foot as well.  Reports numbness and stiffness.    Review of Systems    Review of Systems   Constitutional:  Negative for appetite change, chills, fatigue and fever.   Respiratory:  Negative for cough and shortness of breath.    Cardiovascular:  Negative for chest pain, palpitations and leg swelling.   Gastrointestinal:  Negative for blood in stool, constipation, diarrhea, nausea and vomiting.   Musculoskeletal:  Negative for arthralgias, gait problem and joint swelling.   Skin:  Negative for color change and wound.   Neurological:  Negative for weakness.        
Systems  System  Neg/Pos  Details  Constitutional  Negative  Chills, Fatigue, Fever and Night Sweats  Respiratory  Negative  Chest Pain, Cough and Dyspnea  Cardio   Negative  Leg Swelling  GI   Negative  Abdominal Pain, Constipation, Nausea and Vomiting     Negative  Urinary Incontinence   Endocrine  Negative  Weight Gain and Weight Loss  MS   Negative  Except as noted in HPI and Chief Complaint    Ht 1.753 m (5' 9\")   Wt 74.8 kg (165 lb)   BMI 24.37 kg/m²      Physical Exam   Physical Examination:  General: The patient is a Well Nourished 75 y.o. female who is alert and oriented x3 in no distress. The patient is cooperative during the exam.  Psychological: Is a pleasant female, good mood and affect, answers questions appropriately.  Head and Neck: Normocephalic, Atraumatic.   Eyes: Perrla. Extraocular movements intact.   Respiratory: Rate and effort within normal limits.   Vascular: Negative Homans signs. Dorsalis pedis pulse, posterior pulses 2+ bilaterally.  Cap refill less than 3 seconds.  Neurological: A adequate sharp-dull and proprioception.   Dermatological: Incision sites well-healed.  Discoloration. No increasing warmth to the lower extremities. No erythema. No ecchymoses.   Musculoskeletal: No pain with stress over the first metatarsophalangeal arthrodesis site.  Hallux and out of alignment.      Plan    ICD-10-CM    1. Aftercare following surgery of the musculoskeletal system  Z47.89       2. Hallux rigidus of right foot  M20.21            Plan Narrative  Patient's condition was discussed.  Did explain that she is likely to have some discoloration for several months following this type of procedure.  She has adequate cap refill.  Her toes are warm to the touch.  She relates overall decrease in pain.  She will continue increasing activity level as tolerated.  We are happy to see her back in the clinic for this with her problems.    Return if symptoms worsen or fail to improve.      Patient given

## 2025-07-07 ENCOUNTER — OFFICE VISIT (OUTPATIENT)
Dept: PULMONOLOGY | Age: 75
End: 2025-07-07
Payer: MEDICARE

## 2025-07-07 VITALS
TEMPERATURE: 96.9 F | DIASTOLIC BLOOD PRESSURE: 74 MMHG | BODY MASS INDEX: 24.88 KG/M2 | SYSTOLIC BLOOD PRESSURE: 147 MMHG | HEIGHT: 69 IN | OXYGEN SATURATION: 95 % | RESPIRATION RATE: 20 BRPM | WEIGHT: 168 LBS | HEART RATE: 77 BPM

## 2025-07-07 DIAGNOSIS — Z87.891 HISTORY OF SMOKING: ICD-10-CM

## 2025-07-07 DIAGNOSIS — R91.1 LUNG NODULE: ICD-10-CM

## 2025-07-07 DIAGNOSIS — Z77.22 SECOND HAND SMOKE EXPOSURE: ICD-10-CM

## 2025-07-07 DIAGNOSIS — G47.33 OBSTRUCTIVE SLEEP APNEA: ICD-10-CM

## 2025-07-07 DIAGNOSIS — R94.2 DIFFUSION CAPACITY OF LUNG (DL), DECREASED: ICD-10-CM

## 2025-07-07 DIAGNOSIS — J18.1 LUNG CONSOLIDATION: Primary | ICD-10-CM

## 2025-07-07 PROCEDURE — 1123F ACP DISCUSS/DSCN MKR DOCD: CPT | Performed by: NURSE PRACTITIONER

## 2025-07-07 PROCEDURE — 1090F PRES/ABSN URINE INCON ASSESS: CPT | Performed by: NURSE PRACTITIONER

## 2025-07-07 PROCEDURE — G8427 DOCREV CUR MEDS BY ELIG CLIN: HCPCS | Performed by: NURSE PRACTITIONER

## 2025-07-07 PROCEDURE — 1159F MED LIST DOCD IN RCRD: CPT | Performed by: NURSE PRACTITIONER

## 2025-07-07 PROCEDURE — G8420 CALC BMI NORM PARAMETERS: HCPCS | Performed by: NURSE PRACTITIONER

## 2025-07-07 PROCEDURE — G8399 PT W/DXA RESULTS DOCUMENT: HCPCS | Performed by: NURSE PRACTITIONER

## 2025-07-07 PROCEDURE — 1036F TOBACCO NON-USER: CPT | Performed by: NURSE PRACTITIONER

## 2025-07-07 PROCEDURE — 99213 OFFICE O/P EST LOW 20 MIN: CPT | Performed by: NURSE PRACTITIONER

## 2025-07-07 PROCEDURE — 3017F COLORECTAL CA SCREEN DOC REV: CPT | Performed by: NURSE PRACTITIONER

## 2025-07-07 ASSESSMENT — ENCOUNTER SYMPTOMS
SHORTNESS OF BREATH: 0
EYES NEGATIVE: 1
GASTROINTESTINAL NEGATIVE: 1
ALLERGIC/IMMUNOLOGIC NEGATIVE: 1

## 2025-07-07 NOTE — PROGRESS NOTES
times daily as needed  Grant Nuñez MD   pramipexole (MIRAPEX) 1 MG tablet Take 1 tablet by mouth 3 times daily  Grant Nuñez MD   clobetasol (TEMOVATE) 0.05 % cream   Grant Nuñez MD   pantoprazole (PROTONIX) 40 MG tablet TAKE 1 TABLET BY MOUTH TWICE DAILY BEFORE MEALS  Cara Allred MD   hydroxychloroquine (PLAQUENIL) 200 MG tablet Take 1.5 tablets by mouth daily  Grant Nuñez MD   Multiple Vitamins-Minerals (CENTRUM/CERTA-SHERIE WITH MINERALS ORAL) solution Take 15 mLs by mouth daily  Grant Nuñez MD   traZODone (DESYREL) 50 MG tablet TAKE 1 TABLET BY MOUTH AT BEDTIME AS NEEDED  Grant Nuñez MD   Vitamin D, Cholecalciferol, 25 MCG (1000 UT) CAPS   Grant Nuñez MD   buPROPion (WELLBUTRIN XL) 300 MG extended release tablet   Grant Nuñez MD        Review of Systems   Constitutional: Negative.    HENT: Negative.     Eyes: Negative.    Respiratory:  Negative for shortness of breath.    Cardiovascular: Negative.    Gastrointestinal: Negative.    Musculoskeletal: Negative.    Skin: Negative.    Allergic/Immunologic: Negative.    Neurological: Negative.    Psychiatric/Behavioral: Negative.         Vitals:    07/07/25 1051   BP: (!) 147/74   Pulse: 77   Resp: 20   Temp: 96.9 °F (36.1 °C)   SpO2: 95%   Weight: 76.2 kg (168 lb)   Height: 1.753 m (5' 9\")      BMI Readings from Last 1 Encounters:   07/07/25 24.81 kg/m²         Physical Exam  Constitutional:       Appearance: Normal appearance.   HENT:      Head: Normocephalic.      Nose: Nose normal.      Mouth/Throat:      Mouth: Mucous membranes are moist.   Eyes:      Conjunctiva/sclera: Conjunctivae normal.   Cardiovascular:      Rate and Rhythm: Normal rate and regular rhythm.      Pulses: Normal pulses.   Pulmonary:      Effort: Pulmonary effort is normal.      Breath sounds: Normal breath sounds.   Skin:     General: Skin is warm and dry.   Neurological:      General: No focal deficit

## 2025-08-07 ENCOUNTER — HOSPITAL ENCOUNTER (OUTPATIENT)
Dept: CT IMAGING | Age: 75
Discharge: HOME | End: 2025-08-07
Payer: MEDICARE

## 2025-08-07 DIAGNOSIS — R91.1 LUNG NODULE: ICD-10-CM

## 2025-08-07 PROCEDURE — 71250 CT THORAX DX C-: CPT

## 2025-08-14 ENCOUNTER — OFFICE VISIT (OUTPATIENT)
Dept: PULMONOLOGY | Age: 75
End: 2025-08-14
Payer: MEDICARE

## 2025-08-14 ENCOUNTER — OFFICE VISIT (OUTPATIENT)
Dept: NEUROLOGY | Age: 75
End: 2025-08-14

## 2025-08-14 VITALS
HEIGHT: 69 IN | WEIGHT: 165 LBS | SYSTOLIC BLOOD PRESSURE: 138 MMHG | BODY MASS INDEX: 24.44 KG/M2 | HEART RATE: 69 BPM | DIASTOLIC BLOOD PRESSURE: 63 MMHG | OXYGEN SATURATION: 99 %

## 2025-08-14 VITALS
SYSTOLIC BLOOD PRESSURE: 164 MMHG | WEIGHT: 168.6 LBS | DIASTOLIC BLOOD PRESSURE: 65 MMHG | OXYGEN SATURATION: 96 % | HEIGHT: 69 IN | HEART RATE: 67 BPM | BODY MASS INDEX: 24.97 KG/M2 | TEMPERATURE: 97.7 F

## 2025-08-14 DIAGNOSIS — Z87.891 HISTORY OF SMOKING: ICD-10-CM

## 2025-08-14 DIAGNOSIS — R91.1 LUNG NODULE: Primary | ICD-10-CM

## 2025-08-14 DIAGNOSIS — R94.2 DIFFUSION CAPACITY OF LUNG (DL), DECREASED: ICD-10-CM

## 2025-08-14 DIAGNOSIS — G47.33 OBSTRUCTIVE SLEEP APNEA: Primary | ICD-10-CM

## 2025-08-14 DIAGNOSIS — G47.33 OBSTRUCTIVE SLEEP APNEA: ICD-10-CM

## 2025-08-14 DIAGNOSIS — Z45.42 ENCOUNTER FOR ADJUSTMENT AND MANAGEMENT OF NEUROSTIMULATOR: ICD-10-CM

## 2025-08-14 PROCEDURE — G8427 DOCREV CUR MEDS BY ELIG CLIN: HCPCS | Performed by: NURSE PRACTITIONER

## 2025-08-14 PROCEDURE — 1090F PRES/ABSN URINE INCON ASSESS: CPT | Performed by: NURSE PRACTITIONER

## 2025-08-14 PROCEDURE — 3017F COLORECTAL CA SCREEN DOC REV: CPT | Performed by: NURSE PRACTITIONER

## 2025-08-14 PROCEDURE — 1159F MED LIST DOCD IN RCRD: CPT | Performed by: NURSE PRACTITIONER

## 2025-08-14 PROCEDURE — 1036F TOBACCO NON-USER: CPT | Performed by: NURSE PRACTITIONER

## 2025-08-14 PROCEDURE — G8399 PT W/DXA RESULTS DOCUMENT: HCPCS | Performed by: NURSE PRACTITIONER

## 2025-08-14 PROCEDURE — 1123F ACP DISCUSS/DSCN MKR DOCD: CPT | Performed by: NURSE PRACTITIONER

## 2025-08-14 PROCEDURE — 99213 OFFICE O/P EST LOW 20 MIN: CPT | Performed by: NURSE PRACTITIONER

## 2025-08-14 PROCEDURE — G8420 CALC BMI NORM PARAMETERS: HCPCS | Performed by: NURSE PRACTITIONER

## 2025-08-14 RX ORDER — MAGNESIUM 30 MG
30 TABLET ORAL 2 TIMES DAILY
COMMUNITY

## 2025-08-14 RX ORDER — ASPIRIN 81 MG/1
325 TABLET ORAL DAILY
COMMUNITY

## 2025-08-14 RX ORDER — BUSPIRONE HYDROCHLORIDE 5 MG/1
TABLET ORAL
COMMUNITY

## 2025-08-14 ASSESSMENT — ENCOUNTER SYMPTOMS
ALLERGIC/IMMUNOLOGIC NEGATIVE: 1
RESPIRATORY NEGATIVE: 1
EYES NEGATIVE: 1
GASTROINTESTINAL NEGATIVE: 1

## (undated) DEVICE — CATH IV ANGIOCATH FEP 14GA 1.88IN ORNG

## (undated) DEVICE — ADHS LIQ MASTISOL 2/3ML

## (undated) DEVICE — BANDAGE,GAUZE,BULKEE II,4.5"X4.1YD,STRL: Brand: MEDLINE

## (undated) DEVICE — SUT ANTIB MONOCRYL PLS 5/0 18IN VIL

## (undated) DEVICE — BAPTIST TURNOVER KIT: Brand: MEDLINE INDUSTRIES, INC.

## (undated) DEVICE — PATIENT RETURN ELECTRODE, SINGLE-USE, CONTACT QUALITY MONITORING, ADULT, WITH 9FT CORD, FOR PATIENTS WEIGING OVER 33LBS. (15KG): Brand: MEGADYNE

## (undated) DEVICE — HARMONIC FOCUS SHEARS 9CM LENGTH + ADAPTIVE TISSUE TECHNOLOGY FOR USE WITH BLUE HAND PIECE ONLY: Brand: HARMONIC FOCUS

## (undated) DEVICE — BNDG ELAS ECON W/CLIP 4IN 5YD LF STRL

## (undated) DEVICE — TOWEL,OR,DSP,ST,BLUE,STD,4/PK,20PK/CS: Brand: MEDLINE

## (undated) DEVICE — TBG SMPL FLTR LINE NASL 02/C02 A/ BX/100

## (undated) DEVICE — ELECTRD BLD EZ CLN MOD XLNG 2.75IN

## (undated) DEVICE — MASK,OXYGEN,MED CONC,ADLT,7' TUB, UC: Brand: PENDING

## (undated) DEVICE — THE SINGLE USE ETRAP – POLYP TRAP IS USED FOR SUCTION RETRIEVAL OF ENDOSCOPICALLY REMOVED POLYPS.: Brand: ETRAP

## (undated) DEVICE — CVR UNIV C/ARM

## (undated) DEVICE — SYR LL TP 10ML STRL

## (undated) DEVICE — PRECISION THIN (9.0 X 0.38 X 25.0MM)

## (undated) DEVICE — CUFF,BP,DISP,1 TUBE,ADULT,HP: Brand: MEDLINE

## (undated) DEVICE — 4.0MM EGG BUR

## (undated) DEVICE — DEV INFL ALLIANCE2 SYS

## (undated) DEVICE — SNAR POLYP CAPTIVATOR RND STFF 2.4 240CM 10MM 1P/U

## (undated) DEVICE — APPL DURAPREP IODOPHOR APL 26ML

## (undated) DEVICE — SPNG GZ WOVN 4X4IN 12PLY 10/BX STRL

## (undated) DEVICE — Device: Brand: DEFENDO AIR/WATER/SUCTION AND BIOPSY VALVE

## (undated) DEVICE — NEEDLE,18GX1.5",REG,BEVEL: Brand: MEDLINE

## (undated) DEVICE — PK ENT HD AND NK 30

## (undated) DEVICE — SWAB OPTHALMIC VISI COTN PK/10

## (undated) DEVICE — MARKR SKIN W/RULR AND LBL

## (undated) DEVICE — TRAP FLD MINIVAC MEGADYNE 100ML

## (undated) DEVICE — INTENDED FOR TISSUE SEPARATION, AND OTHER PROCEDURES THAT REQUIRE A SHARP SURGICAL BLADE TO PUNCTURE OR CUT.: Brand: BARD-PARKER ® STAINLESS STEEL BLADES

## (undated) DEVICE — YANKAUER,BULB TIP WITH VENT: Brand: ARGYLE

## (undated) DEVICE — ANTIBACTERIAL UNDYED BRAIDED (POLYGLACTIN 910), SYNTHETIC ABSORBABLE SUTURE: Brand: COATED VICRYL

## (undated) DEVICE — FRAZIER SUCTION INSTRUMENT 10 FR W/CONTROL VENT & OBTURATOR: Brand: FRAZIER

## (undated) DEVICE — AUTOGRAFT HARVESTING SYSTEM: Brand: FASTGRAFTER

## (undated) DEVICE — BNDG ESMARK 4IN 9FT LF STRL BLU

## (undated) DEVICE — TUBING, SUCTION, 1/4" X 12', STRAIGHT: Brand: MEDLINE

## (undated) DEVICE — DRP C/ARMOR

## (undated) DEVICE — SURGICAL SUCTION CONNECTING TUBE WITH MALE CONNECTOR AND SUCTION CLAMP, 2 FT. LONG (.6 M), 5 MM I.D.: Brand: CONMED

## (undated) DEVICE — GLV SURG SENSICARE PI ORTHO SZ8 LF STRL

## (undated) DEVICE — HDRST POSITIONING FM RND 2X9IN

## (undated) DEVICE — CODMAN® DISPOSABLE CATHETER PASSER: Brand: CODMAN®

## (undated) DEVICE — 3M™ STERI-DRAPE™ FLUOROSCOPE DRAPE, 10 PER CARTON / 4 CARTONS PER CASE, 1012: Brand: STERI-DRAPE™

## (undated) DEVICE — VAGINAL PREP TRAY: Brand: MEDLINE INDUSTRIES, INC.

## (undated) DEVICE — SUT SILK 3/0 SUTUPAK TIES 24IN SA74H

## (undated) DEVICE — DRSNG BRDR MEPILEX FLX/LITE FM 4X5CM

## (undated) DEVICE — PROBE 8225401 5PK SD-SD BIPOL STIM ROHS

## (undated) DEVICE — DISPOSABLE TOURNIQUET CUFF SINGLE BLADDER, SINGLE PORT AND QUICK CONNECT CONNECTOR: Brand: COLOR CUFF

## (undated) DEVICE — THE CHANNEL CLEANING BRUSH IS A NYLON FLEXI BRUSH ATTACHED TO A FLEXIBLE PLASTIC SHEATH DESIGNED TO SAFELY REMOVE DEBRIS FROM FLEXIBLE ENDOSCOPES.

## (undated) DEVICE — SENSR O2 OXIMAX FNGR A/ 18IN NONSTR

## (undated) DEVICE — SUT SILK 2/0 SUTUPAK TIES 24IN SA75H

## (undated) DEVICE — BALL PIN JURGAN .062 GRN

## (undated) DEVICE — DRESSING,GAUZE,XEROFORM,CURAD,5"X9",ST: Brand: CURAD

## (undated) DEVICE — KT ANTI FOG W/FLD AND SPNG

## (undated) DEVICE — SUT SILK 3/0 RB1 CR8 18IN C053D

## (undated) DEVICE — CONTRL RMT EXT IPG INSPIRE PROGR SLEEP/RESP

## (undated) DEVICE — CATH IV JELCO 20GAX1 1/4IN

## (undated) DEVICE — STRIP CLS WND CURAD MEDI/STRIP HYPOALLERG 0.25X4IN PK/10

## (undated) DEVICE — LP VESL MAXI 2.5X1MM RED 2PK

## (undated) DEVICE — BNDG ELAS SLF ADHR 2IN 5YD LF

## (undated) DEVICE — PK EXTRM 30

## (undated) DEVICE — ENDOGATOR AUXILIARY WATER JET CONNECTOR: Brand: ENDOGATOR

## (undated) DEVICE — DRSNG WND GZ CURAD OIL EMULSION 3X3IN STRL

## (undated) DEVICE — GLV SURG TRIUMPH ORTHO W/ALOE PF LTX 8 STRL

## (undated) DEVICE — SPONGE,DISSECTOR,K,XRAY,9/16"X1/4",STRL: Brand: MEDLINE

## (undated) DEVICE — ESOPHAGEAL BALLOON DILATATION CATHETER: Brand: CRE FIXED WIRE

## (undated) DEVICE — NDL HYPO PRECISIONGLIDE REG 22G 1 1/2

## (undated) DEVICE — DISPOSABLE TOURNIQUET CUFF 34"X4", 1-LINE, BLUE, STERILE, 1EA/PK, 10PK/CS: Brand: ASP MEDICAL

## (undated) DEVICE — DRSNG SURESITE WNDW 2.38X2.75

## (undated) DEVICE — SUT SILK 4/0 SUTUPAK TIES 24IN SA73H

## (undated) DEVICE — CONTAINER,SPECIMEN,OR STERILE,4OZ: Brand: MEDLINE

## (undated) DEVICE — 3M™ IOBAN™ 2 ANTIMICROBIAL INCISE DRAPE 6651EZ: Brand: IOBAN™ 2

## (undated) DEVICE — CVR BRD ARM 13X30

## (undated) DEVICE — SUT SILK 2/0 SH CR8 18IN CR8 C012D

## (undated) DEVICE — BANDAGE,GAUZE,CONFORMING,1"X75",STRL,LF: Brand: MEDLINE

## (undated) DEVICE — TRY SKINPREP WET CHG 4PCT SPNG HIB

## (undated) DEVICE — STERILE (14X122CM) TELESCOPICALLY-FOLDED COVER: Brand: CIV-CLEAR™ TRANSDUCER COVER

## (undated) DEVICE — TOWEL,OR,DSP,ST,BLUE,DLX,10/PK,8PK/CS: Brand: MEDLINE

## (undated) DEVICE — SUT ETHLN 3/0 FS1 30IN 669H

## (undated) DEVICE — SUT ETHLN 4/0 FS2 18IN 662H

## (undated) DEVICE — GLV SURG BIOGEL M LTX PF 7 1/2

## (undated) DEVICE — CONMED SCOPE SAVER BITE BLOCK, 20X27 MM: Brand: SCOPE SAVER

## (undated) DEVICE — 4-PORT MANIFOLD: Brand: NEPTUNE 2

## (undated) DEVICE — SUT MNCRYL 4/0 P3 18IN UD MCP494G

## (undated) DEVICE — PK TURNOVER RM ADV

## (undated) DEVICE — DRSNG GZ CURAD XEROFORM NONADHS 5X9IN STRL

## (undated) DEVICE — GOWN,NON-REINFORCED,SIRUS,SET IN SLV,XL: Brand: MEDLINE

## (undated) DEVICE — SNAR POLYP SENSATION MICRO OVL 13 240X40

## (undated) DEVICE — SKIN PREP TRAY W/CHG: Brand: MEDLINE INDUSTRIES, INC.